# Patient Record
Sex: MALE | Race: WHITE | NOT HISPANIC OR LATINO | Employment: FULL TIME | URBAN - METROPOLITAN AREA
[De-identification: names, ages, dates, MRNs, and addresses within clinical notes are randomized per-mention and may not be internally consistent; named-entity substitution may affect disease eponyms.]

---

## 2017-01-16 ENCOUNTER — TELEPHONE (OUTPATIENT)
Dept: UROLOGY | Facility: CLINIC | Age: 57
End: 2017-01-16

## 2017-01-16 NOTE — TELEPHONE ENCOUNTER
----- Message from Erendira Lucero sent at 1/16/2017 12:10 PM CST -----  Contact: [phone]  Pt would like to be seen sooner than 2/3/2017. Please advise.

## 2017-01-16 NOTE — TELEPHONE ENCOUNTER
Blood in semen has returned. Will reschedule appointment to 1.18.17 at 3:30 pm, verbalized understanding.

## 2017-01-18 ENCOUNTER — OFFICE VISIT (OUTPATIENT)
Dept: UROLOGY | Facility: CLINIC | Age: 57
End: 2017-01-18
Payer: COMMERCIAL

## 2017-01-18 VITALS
SYSTOLIC BLOOD PRESSURE: 132 MMHG | BODY MASS INDEX: 28.31 KG/M2 | TEMPERATURE: 98 F | WEIGHT: 209 LBS | HEART RATE: 66 BPM | DIASTOLIC BLOOD PRESSURE: 70 MMHG | HEIGHT: 72 IN | OXYGEN SATURATION: 99 %

## 2017-01-18 DIAGNOSIS — R36.1 HEMATOSPERMIA: Primary | ICD-10-CM

## 2017-01-18 DIAGNOSIS — R35.1 NOCTURIA: ICD-10-CM

## 2017-01-18 DIAGNOSIS — N40.1 BENIGN NON-NODULAR PROSTATIC HYPERPLASIA WITH LOWER URINARY TRACT SYMPTOMS: ICD-10-CM

## 2017-01-18 DIAGNOSIS — R31.9 HEMATURIA: ICD-10-CM

## 2017-01-18 LAB
BILIRUB UR QL STRIP: NEGATIVE
CLARITY UR REFRACT.AUTO: CLEAR
COLOR UR AUTO: YELLOW
GLUCOSE UR QL STRIP: NEGATIVE
HGB UR QL STRIP: NEGATIVE
KETONES UR QL STRIP: NEGATIVE
LEUKOCYTE ESTERASE UR QL STRIP: NEGATIVE
NITRITE UR QL STRIP: NEGATIVE
PH UR STRIP: 6 [PH] (ref 5–8)
PROT UR QL STRIP: NEGATIVE
SP GR UR STRIP: 1.02 (ref 1–1.03)
URN SPEC COLLECT METH UR: NORMAL
UROBILINOGEN UR STRIP-ACNC: 2 EU/DL

## 2017-01-18 PROCEDURE — 1159F MED LIST DOCD IN RCRD: CPT | Mod: S$GLB,,, | Performed by: UROLOGY

## 2017-01-18 PROCEDURE — 87086 URINE CULTURE/COLONY COUNT: CPT

## 2017-01-18 PROCEDURE — 99999 PR PBB SHADOW E&M-EST. PATIENT-LVL III: CPT | Mod: PBBFAC,,, | Performed by: UROLOGY

## 2017-01-18 PROCEDURE — 99212 OFFICE O/P EST SF 10 MIN: CPT | Mod: S$GLB,,, | Performed by: UROLOGY

## 2017-01-18 PROCEDURE — 81003 URINALYSIS AUTO W/O SCOPE: CPT

## 2017-01-18 NOTE — PROGRESS NOTES
Subjective:       Patient ID: Diallo Dawkins is a 56 y.o. male.    Chief Complaint: Other (f/u hematospermia)    HPI Comments: Hematospermia recurrent.    Other   This is a recurrent problem. The current episode started 1 to 4 weeks ago. The problem occurs constantly. The problem has been unchanged. Pertinent negatives include no abdominal pain, anorexia, arthralgias, change in bowel habit, chest pain, chills, congestion, coughing, diaphoresis, fatigue, fever, headaches, joint swelling, myalgias, nausea, neck pain, numbness, rash, sore throat, swollen glands, urinary symptoms, vertigo, visual change, vomiting or weakness. Nothing aggravates the symptoms. He has tried nothing for the symptoms.     Review of Systems   Constitutional: Negative for activity change, appetite change, chills, diaphoresis, fatigue, fever and unexpected weight change.   HENT: Negative for congestion, hearing loss, sinus pressure, sore throat and trouble swallowing.    Eyes: Negative for photophobia, pain, discharge and visual disturbance.   Respiratory: Negative for apnea, cough and shortness of breath.    Cardiovascular: Negative for chest pain, palpitations and leg swelling.   Gastrointestinal: Negative for abdominal distention, abdominal pain, anal bleeding, anorexia, blood in stool, change in bowel habit, constipation, diarrhea, nausea, rectal pain and vomiting.   Endocrine: Negative for cold intolerance, heat intolerance, polydipsia, polyphagia and polyuria.   Genitourinary: Negative for decreased urine volume, difficulty urinating, discharge, dysuria, enuresis, flank pain, frequency, genital sores, hematuria, penile pain, penile swelling, scrotal swelling, testicular pain and urgency.   Musculoskeletal: Negative for arthralgias, back pain, joint swelling, myalgias and neck pain.   Skin: Negative for color change, pallor, rash and wound.   Allergic/Immunologic: Negative for environmental allergies, food allergies and  immunocompromised state.   Neurological: Negative for dizziness, vertigo, seizures, weakness, numbness and headaches.   Hematological: Negative for adenopathy. Does not bruise/bleed easily.   Psychiatric/Behavioral: Negative.        Objective:      Physical Exam   Nursing note and vitals reviewed.  Constitutional: He is oriented to person, place, and time. He appears well-developed and well-nourished.   HENT:   Head: Normocephalic.   Nose: Nose normal.   Mouth/Throat: Oropharynx is clear and moist.   Eyes: Conjunctivae and EOM are normal. Pupils are equal, round, and reactive to light.   Neck: Normal range of motion. Neck supple.   Cardiovascular: Normal rate, regular rhythm, normal heart sounds and intact distal pulses.    Pulmonary/Chest: Effort normal and breath sounds normal.   Abdominal: Soft. Bowel sounds are normal.   Genitourinary: Rectum normal and penis normal. Prostate is enlarged and tender.   Musculoskeletal: Normal range of motion.   Neurological: He is alert and oriented to person, place, and time. He has normal reflexes.   Skin: Skin is warm and dry.     Psychiatric: He has a normal mood and affect. His behavior is normal. Judgment and thought content normal.       Assessment:       1. Hematospermia    2. Benign non-nodular prostatic hyperplasia with lower urinary tract symptoms    3. Hematuria    4. Nocturia        Plan:       Patient Instructions   Check U/A and Urine C+S  F/U 6 mo for PSA and PARISH

## 2017-01-18 NOTE — MR AVS SNAPSHOT
[facility]  [address]  [place] [address]  Phone: [phone]  Fax: [phone]                  Diallo Dawkins   2017 3:30 PM   Office Visit    Description:  Male : 1960   Provider:  Maxx Arana MD   Department:  [facility]           Reason for Visit     Other           Diagnoses this Visit        Comments    Hematospermia    -  Primary     Benign non-nodular prostatic hyperplasia with lower urinary tract symptoms         Hematuria         Nocturia                To Do List           Future Appointments        Provider Department Dept Phone    2017 8:30 AM Denis Miguel MD [facility] [phone]      Goals (5 Years of Data)     None      Follow-Up and Disposition     Return in about 6 months (around 2017) for with PSA and PARISH.    Follow-up and Disposition History      [facility]     [facility] Care [facility] -  Assistance  Registered nurses in the [facility] provide clinical advisement, health education, appointment booking, and other advisory services.  Call for this free service at [phone].             Medications           Message regarding Medications     Verify the changes and/or additions to your medication regime listed below are the same as discussed with your clinician today.  If any of these changes or additions are incorrect, please notify your healthcare provider.             Verify that the below list of medications is an accurate representation of the medications you are currently taking.  If none reported, the list may be blank. If incorrect, please contact your healthcare provider. Carry this list with you in case of emergency.                Clinical Reference Information           Vital Signs - Last Recorded  Most recent update: 2017  4:07 PM by Tristan Baxter MA    BP Pulse Temp Ht Wt SpO2    132/70 66 98 °F (36.7 °C) 6' (1.829 m) 94.8 kg (209 lb) 99%    BMI                 28.35 kg/m2          Blood Pressure          Most Recent Value    BP  132/70      Allergies as of 1/18/2017     No Known Allergies      Immunizations Administered on Date of Encounter - 1/18/2017     None      Orders Placed During Today's Visit      Normal Orders This Visit    Urinalysis     Urine culture     Future Labs/Procedures Expected by Expires    Urine culture  1/18/2017 3/19/2018    Urinalysis  As directed 5/20/2017      [facility] Sign-Up     Activating your [facility] account is as easy as 1-2-3!     1) Visit [email or web address], select Sign Up Now, enter this activation code and your date of birth, then select Next.  [email or web address]  Expires: 3/4/2017  4:33 PM      2) Create a username and password to use when you visit [facility] in the future and select a security question in case you lose your password and select Next.    3) Enter your e-mail address and click Sign Up!    Additional Information  If you have questions, please e-mail [email or web address] or call [phone] to talk to our [facility] staff. Remember, [facility] is NOT to be used for urgent needs. For medical emergencies, dial 911.         Instructions    Check U/A and Urine C+S  F/U 6 mo for PSA and PARISH

## 2017-01-19 ENCOUNTER — TELEPHONE (OUTPATIENT)
Dept: UROLOGY | Facility: CLINIC | Age: 57
End: 2017-01-19

## 2017-01-19 LAB — BACTERIA UR CULT: NO GROWTH

## 2017-01-23 ENCOUNTER — OFFICE VISIT (OUTPATIENT)
Dept: FAMILY MEDICINE | Facility: CLINIC | Age: 57
End: 2017-01-23
Payer: COMMERCIAL

## 2017-01-23 VITALS
OXYGEN SATURATION: 98 % | BODY MASS INDEX: 28.07 KG/M2 | DIASTOLIC BLOOD PRESSURE: 90 MMHG | HEART RATE: 69 BPM | TEMPERATURE: 98 F | WEIGHT: 207 LBS | SYSTOLIC BLOOD PRESSURE: 135 MMHG

## 2017-01-23 DIAGNOSIS — M25.511 CHRONIC RIGHT SHOULDER PAIN: ICD-10-CM

## 2017-01-23 DIAGNOSIS — Z11.59 NEED FOR HEPATITIS C SCREENING TEST: ICD-10-CM

## 2017-01-23 DIAGNOSIS — G89.29 CHRONIC RIGHT SHOULDER PAIN: ICD-10-CM

## 2017-01-23 DIAGNOSIS — Z23 FLU VACCINE NEED: ICD-10-CM

## 2017-01-23 DIAGNOSIS — Z00.00 ROUTINE GENERAL MEDICAL EXAMINATION AT A HEALTH CARE FACILITY: Primary | ICD-10-CM

## 2017-01-23 DIAGNOSIS — R03.0 ELEVATED BLOOD PRESSURE READING: ICD-10-CM

## 2017-01-23 DIAGNOSIS — Z23 NEED FOR IMMUNIZATION AGAINST TETANUS ALONE: ICD-10-CM

## 2017-01-23 PROCEDURE — 99386 PREV VISIT NEW AGE 40-64: CPT | Mod: S$GLB,,, | Performed by: INTERNAL MEDICINE

## 2017-01-23 PROCEDURE — 90471 IMMUNIZATION ADMIN: CPT | Mod: S$GLB,,, | Performed by: INTERNAL MEDICINE

## 2017-01-23 PROCEDURE — 90715 TDAP VACCINE 7 YRS/> IM: CPT | Mod: S$GLB,,, | Performed by: INTERNAL MEDICINE

## 2017-01-23 PROCEDURE — 90472 IMMUNIZATION ADMIN EACH ADD: CPT | Mod: S$GLB,,, | Performed by: INTERNAL MEDICINE

## 2017-01-23 PROCEDURE — 90686 IIV4 VACC NO PRSV 0.5 ML IM: CPT | Mod: S$GLB,,, | Performed by: INTERNAL MEDICINE

## 2017-01-23 PROCEDURE — 99999 PR PBB SHADOW E&M-EST. PATIENT-LVL III: CPT | Mod: PBBFAC,,, | Performed by: INTERNAL MEDICINE

## 2017-01-23 NOTE — PROGRESS NOTES
HPI:  Diallo Dawkins is a 56 y.o. year old male that  presents with   Chief Complaint   Patient presents with    Annual Exam    Shoulder Pain    Hypertension   .       History reviewed. No pertinent past medical history.  Social History     Social History    Marital status: Single     Spouse name: N/A    Number of children: N/A    Years of education: N/A     Occupational History    Not on file.     Social History Main Topics    Smoking status: Never Smoker    Smokeless tobacco: Not on file    Alcohol use No    Drug use: No    Sexual activity: Not on file     Other Topics Concern    Not on file     Social History Narrative    No narrative on file     Past Surgical History   Procedure Laterality Date    Other surgical history       angiogram on wrist     Family History   Problem Relation Age of Onset    Cancer Paternal Grandmother            HPI Comments: Pt stated that he was told his BP was elevated on donating blood.  No meds now.  Does not want any.  Consulted pt on needd to to DASH, but if persistent in 1 month will need meds.    Pt had cscope 6 years ago and had polyps. Will see previous GI.    No td or flu recently.    C/O rt shoulder pain which is chronic.  Saw [facility] in the past and was referred to PT which he did not do.  Not on NSAID's for it.  Will refer to PT.  Pain is worse with reaching around to back or with elevating over his head.  Will refer to PT.    Reviewed current chart and will need to get old records from [facility].                    Shoulder Pain    The pain is present in the right shoulder. This is a chronic problem. The current episode started more than 1 year ago. There has been no history of extremity trauma. The problem occurs daily. The problem has been unchanged. The quality of the pain is described as aching. The pain is mild. Pertinent negatives include no fever, headaches, inability to bear weight, joint locking, joint swelling, limited range of motion, numbness  or tingling. The symptoms are aggravated by activity. He has tried nothing for the symptoms.   Hypertension   The current episode started more than 1 month ago (Pt stated that he was told it was elevated when donating blood). The problem is unchanged. Associated symptoms include neck pain. Pertinent negatives include no blurred vision, chest pain, headaches, malaise/fatigue or shortness of breath. Risk factors for coronary artery disease include male gender. Past treatments include nothing. There is no history of angina, kidney disease or CAD/MI. There is no history of chronic renal disease or hyperaldosteronism.       Health Maintenance Topics with due status: Overdue       Topic Date Due    Hepatitis C Screening 1960    Lipid Panel 1960    TETANUS VACCINE 08/16/1976    Colonoscopy 08/16/2008    Influenza Vaccine 08/01/2016       Review of Systems  Review of Systems   Constitutional: Negative for chills, fever, malaise/fatigue and weight loss.   Eyes: Negative for blurred vision.   Respiratory: Negative for shortness of breath.    Cardiovascular: Negative for chest pain.   Gastrointestinal: Negative for abdominal pain.   Genitourinary: Negative for dysuria.   Musculoskeletal: Positive for neck pain.   Skin: Negative for rash.   Neurological: Negative for tingling, numbness and headaches.   Psychiatric/Behavioral: The patient is not nervous/anxious.          Physical Exam:  Visit Vitals    BP (!) 135/90    Pulse 69    Temp 98.1 °F (36.7 °C)    Wt 93.9 kg (207 lb)    SpO2 98%    BMI 28.07 kg/m2     Physical Exam   Constitutional: He is oriented to person, place, and time and well-developed, well-nourished, and in no distress.   HENT:   Head: Normocephalic and atraumatic.   Eyes: Conjunctivae and EOM are normal. Pupils are equal, round, and reactive to light.   Neck: Normal range of motion. Carotid bruit is not present. No thyromegaly present.   Cardiovascular: Normal rate, regular rhythm, normal  heart sounds and intact distal pulses.  Exam reveals no gallop and no friction rub.    No murmur heard.  Pulmonary/Chest: Effort normal and breath sounds normal. No respiratory distress. He has no wheezes. He has no rales. He exhibits no tenderness.   Abdominal: Soft. Bowel sounds are normal. He exhibits no distension and no mass. There is no tenderness. There is no rebound and no guarding.   Musculoskeletal: Normal range of motion.   Neurological: He is alert and oriented to person, place, and time. Gait normal.   Skin: Skin is warm and dry. No rash noted.   Psychiatric: Affect normal.         LABS:    Recent Results (from the past 2016 hour(s))   Urine culture    Collection Time: 01/18/17  4:15 PM   Result Value Ref Range    Urine Culture, Routine No growth    Urinalysis    Collection Time: 01/18/17  4:15 PM   Result Value Ref Range    Specimen UA Urine, Clean Catch     Color, UA Yellow Yellow, Straw, [place]    Appearance, UA Clear Clear    pH, UA 6.0 5.0 - 8.0    Specific Gravity, UA 1.020 1.005 - 1.030    Protein, UA Negative Negative    Glucose, UA Negative Negative    Ketones, UA Negative Negative    Bilirubin (UA) Negative Negative    Occult Blood UA Negative Negative    Nitrite, UA Negative Negative    Urobilinogen, UA 2.0 <2.0 EU/dL    Leukocytes, UA Negative Negative       Imaging:  Imaging Results     None            Assessment:    ICD-10-CM ICD-9-CM    1. Routine general medical examination at a health care facility Z00.00 V70.0 Ambulatory consult to Gastroenterology      Lipid panel   2. Elevated blood pressure reading R03.0 796.2    3. Need for immunization against tetanus alone Z23 V03.7 Td Vaccine (Adult)   4. Flu vaccine need Z23 V04.81 Influenza - Quadrivalent (3 years & older)   5. Need for hepatitis C screening test Z11.59 V73.89 Hepatitis C antibody   6. Chronic right shoulder pain M25.511 719.41 Ambulatory consult to Physical Therapy    G89.29 338.29      The primary encounter diagnosis was  Routine general medical examination at a health care facility. Diagnoses of Elevated blood pressure reading, Need for immunization against tetanus alone, Flu vaccine need, Need for hepatitis C screening test, and Chronic right shoulder pain were also pertinent to this visit.      Plan:  Orders Placed This Encounter   Procedures    Influenza - Quadrivalent (3 years & older)    Td Vaccine (Adult)    Hepatitis C antibody    Lipid panel    Ambulatory consult to Gastroenterology    Ambulatory consult to Physical Therapy           Denis Miguel MD

## 2017-01-23 NOTE — PATIENT INSTRUCTIONS
Pt is to do DASH diet and follow up in 1 month for bp radha.    Also to get Colonoscopy screening done.    To see PT for shoulder pain.

## 2017-01-23 NOTE — MR AVS SNAPSHOT
[facility]  [address]  [place] [address]  Phone: [phone]  Fax: [phone]                  Diallo Dawkins   2017 8:30 AM   Office Visit    Description:  Male : 1960   Provider:  Denis Miguel MD   Department:  [facility]           Reason for Visit     Annual Exam     Shoulder Pain     Hypertension           Diagnoses this Visit        Comments    Routine general medical examination at a health care facility    -  Primary     Elevated blood pressure reading         Need for immunization against tetanus alone         Flu vaccine need         Need for hepatitis C screening test         Chronic right shoulder pain                To Do List           Future Appointments        Provider Department Dept Phone    2017 8:00 AM Denis Miguel MD [facility] [phone]      Goals (5 Years of Data)     None      Follow-Up and Disposition     Return in about 1 month (around 2017) for follow up.      [facility]     [facility] On Call Nurse Care Line -  Assistance  Registered nurses in the [facility] Center provide clinical advisement, health education, appointment booking, and other advisory services.  Call for this free service at [phone].             Medications           Message regarding Medications     Verify the changes and/or additions to your medication regime listed below are the same as discussed with your clinician today.  If any of these changes or additions are incorrect, please notify your healthcare provider.             Verify that the below list of medications is an accurate representation of the medications you are currently taking.  If none reported, the list may be blank. If incorrect, please contact your healthcare provider. Carry this list with you in case of emergency.                Clinical Reference Information           Vital Signs - Last Recorded  Most recent update: 2017  8:27  AM by Tristan Baxter MA    BP Pulse Temp Wt SpO2 BMI    (!) 135/90 69 98.1 °F (36.7 °C) 93.9 kg (207 lb) 98% 28.07 kg/m2      Blood Pressure          Most Recent Value    BP  (!)  135/90      Allergies as of 1/23/2017     No Known Allergies      Immunizations Administered on Date of Encounter - 1/23/2017     Name Date Dose VIS Date Route    TDAP 1/23/2017 0.5 mL 2/24/2015 Intramuscular    influenza - Quadrivalent - PF (ADULT) 1/23/2017 0.5 mL 8/7/2015 Intramuscular      Orders Placed During Today's Visit      Normal Orders This Visit    Ambulatory consult to Gastroenterology     Ambulatory consult to Physical Therapy     Influenza - Quadrivalent (3 years & older) (PF)     Tdap Vaccine (Adult)     Future Labs/Procedures Expected by Expires    Hepatitis C antibody  1/23/2017 3/24/2018    Lipid panel  1/23/2017 3/24/2018      [facility] Sign-Up     Activating your [facility] account is as easy as 1-2-3!     1) Visit [email or web address], select Sign Up Now, enter this activation code and your date of birth, then select Next.  [email or web address]  Expires: 3/4/2017  4:33 PM      2) Create a username and password to use when you visit [facility] in the future and select a security question in case you lose your password and select Next.    3) Enter your e-mail address and click Sign Up!    Additional Information  If you have questions, please e-mail [email or web address] or call [phone] to talk to our [facility] staff. Remember, [facility] is NOT to be used for urgent needs. For medical emergencies, dial 911.         Instructions    Pt is to do DASH diet and follow up in 1 month for bp radha.    Also to get Colonoscopy screening done.    To see PT for shoulder pain.

## 2017-02-20 DIAGNOSIS — Z12.11 SCREENING FOR COLON CANCER: Primary | ICD-10-CM

## 2017-03-04 ENCOUNTER — TELEPHONE (OUTPATIENT)
Dept: GASTROENTEROLOGY | Facility: CLINIC | Age: 57
End: 2017-03-04

## 2017-03-16 ENCOUNTER — TELEPHONE (OUTPATIENT)
Dept: FAMILY MEDICINE | Facility: CLINIC | Age: 57
End: 2017-03-16

## 2017-03-16 NOTE — TELEPHONE ENCOUNTER
----- Message from Ivonne Field sent at 3/16/2017  8:52 AM CDT -----  Patient's friend, Mia, called.    No. [phone]    Patient missed his lab appointment in January.  He would like to reschedule for 3/23/17 in the morning at [facility].

## 2017-03-27 ENCOUNTER — TELEPHONE (OUTPATIENT)
Dept: FAMILY MEDICINE | Facility: CLINIC | Age: 57
End: 2017-03-27

## 2017-03-27 NOTE — TELEPHONE ENCOUNTER
Results given, patient verbalized understanding. Patient also requested lab results be mailed, sent to home address today.

## 2017-03-27 NOTE — TELEPHONE ENCOUNTER
----- Message from Denis Miguel MD sent at 3/27/2017 10:22 AM CDT -----  OK lipids and neg for hep c screen.

## 2018-03-06 ENCOUNTER — TELEPHONE (OUTPATIENT)
Dept: FAMILY MEDICINE | Facility: CLINIC | Age: 58
End: 2018-03-06

## 2018-03-06 NOTE — TELEPHONE ENCOUNTER
----- Message from Frieda Quinn sent at 3/6/2018  3:37 PM CST -----  Contact: Mia, girlfriendf, [phone]  Called in requesting to schedule an appointment for patient.States he used to see Dr. Nuñez.

## 2018-03-09 ENCOUNTER — OFFICE VISIT (OUTPATIENT)
Dept: FAMILY MEDICINE | Facility: CLINIC | Age: 58
End: 2018-03-09
Payer: COMMERCIAL

## 2018-03-09 VITALS
OXYGEN SATURATION: 100 % | TEMPERATURE: 98 F | DIASTOLIC BLOOD PRESSURE: 94 MMHG | BODY MASS INDEX: 27.89 KG/M2 | SYSTOLIC BLOOD PRESSURE: 130 MMHG | WEIGHT: 205.94 LBS | HEIGHT: 72 IN | HEART RATE: 70 BPM

## 2018-03-09 DIAGNOSIS — H93.19 TINNITUS, UNSPECIFIED LATERALITY: ICD-10-CM

## 2018-03-09 DIAGNOSIS — Z13.0 SCREENING FOR DEFICIENCY ANEMIA: ICD-10-CM

## 2018-03-09 DIAGNOSIS — Z13.1 DIABETES MELLITUS SCREENING: ICD-10-CM

## 2018-03-09 DIAGNOSIS — Z13.220 SCREENING CHOLESTEROL LEVEL: ICD-10-CM

## 2018-03-09 DIAGNOSIS — Z12.5 PROSTATE CANCER SCREENING: ICD-10-CM

## 2018-03-09 DIAGNOSIS — R42 DIZZINESS: ICD-10-CM

## 2018-03-09 DIAGNOSIS — Z13.29 THYROID DISORDER SCREEN: ICD-10-CM

## 2018-03-09 DIAGNOSIS — R51.9 NONINTRACTABLE EPISODIC HEADACHE, UNSPECIFIED HEADACHE TYPE: ICD-10-CM

## 2018-03-09 DIAGNOSIS — I10 ESSENTIAL HYPERTENSION: Primary | ICD-10-CM

## 2018-03-09 PROCEDURE — 3080F DIAST BP >= 90 MM HG: CPT | Mod: S$GLB,,, | Performed by: NURSE PRACTITIONER

## 2018-03-09 PROCEDURE — 99214 OFFICE O/P EST MOD 30 MIN: CPT | Mod: S$GLB,,, | Performed by: NURSE PRACTITIONER

## 2018-03-09 PROCEDURE — 3075F SYST BP GE 130 - 139MM HG: CPT | Mod: S$GLB,,, | Performed by: NURSE PRACTITIONER

## 2018-03-09 PROCEDURE — 99999 PR PBB SHADOW E&M-EST. PATIENT-LVL IV: CPT | Mod: PBBFAC,,, | Performed by: NURSE PRACTITIONER

## 2018-03-09 NOTE — PROGRESS NOTES
Subjective:       Patient ID: Diallo Dawkins is a 57 y.o. male.    Chief Complaint: Fatigue (all started saturday); Headache; and Dizziness (when pt bends over and get up will be light headed)    Patient is a 57-year-old white male with hypertension and BPH that is here today with complaint of dizziness and headaches that are chronic intermittently for the past 8 months.  He reports he had fatigue that started 1 week ago but this has since resolved.  He reports the dizziness is intermittent and going on for 8 months.  He states that the dizziness only occurs when he bends over and stands or when he is kneeling down and standing-only with position changes.  He reports he also has occasional ringing in the ears intermittently.  Patient also reports headaches to the temple area that are on and off for the past 8 months described as a pulsating/throbbing and gets relief with Tylenol over-the-counter.    Patient does have hypertension.  Patient reports his blood pressure has been elevated for the past couple years.  Patient does not want to start a medication for blood pressure.  He is aware that the symptom of dizziness and headache could be related to the hypertension but would first like to trial lifestyle modifications.  He reports his diet is unhealthy and eats lots of fast foods.      Dizziness:   Chronicity:  Chronic  Onset: described more as light-headed feeling that only occurs after standing from a bent over or kneeling position - started around 8 months ago.  Progression since onset:  Waxing and waning  Duration:  Very brief  Dizziness characteristics:  Lightheaded/impending faint and sensation of movement   Associated symptoms: headaches, tinnitus and light-headedness.no hearing loss, no ear pain, no fever, no nausea, no vomiting, no weakness, no palpitations, no facial weakness, no slurred speech, no numbness in extremities and no chest pain.  Aggravated by:  Bending, getting up and position  changes  Treatments tried:  Nothingno head trauma, no head trauma, no ear tubes and no environmental allergies.   undiagnosed high blood pressure  Fatigue   This is a new problem. Episode onset: started 1 week ago. The problem has been resolved. Associated symptoms include headaches. Pertinent negatives include no abdominal pain, arthralgias, chest pain, congestion, coughing, fatigue, fever, joint swelling, myalgias, nausea, neck pain, rash, sore throat, vomiting or weakness.   Headache    This is a chronic problem. Episode onset: on and off for 8 months as well. The problem occurs intermittently. The problem has been waxing and waning. The pain is located in the temporal region. The pain does not radiate. The quality of the pain is described as pulsating. Pain scale: no pain now - headaches are usually 6/10. Associated symptoms include dizziness and tinnitus. Pertinent negatives include no abdominal pain, back pain, blurred vision, coughing, ear pain, fever, hearing loss, loss of balance, nausea, neck pain, rhinorrhea, seizures, sinus pressure, sore throat, vomiting or weakness. Nothing aggravates the symptoms. He has tried acetaminophen for the symptoms. The treatment provided significant relief. There is no history of cluster headaches or recent head traumas. (Undiagnosed high blood pressure)       Previous Medications    No medications on file       History reviewed. No pertinent past medical history.    Past Surgical History:   Procedure Laterality Date    OTHER SURGICAL HISTORY      angiogram on wrist       Family History   Problem Relation Age of Onset    Cancer Paternal Grandmother     No Known Problems Mother     Heart disease Father      passed age73    No Known Problems Sister     No Known Problems Sister     No Known Problems Sister        Social History     Social History    Marital status: Single     Spouse name: N/A    Number of children: N/A    Years of education: N/A     Occupational  History    sales      Social History Main Topics    Smoking status: Never Smoker    Smokeless tobacco: None    Alcohol use No    Drug use: No    Sexual activity: Not Asked     Other Topics Concern    None     Social History Narrative    None       Review of Systems   Constitutional: Negative for activity change, appetite change, fatigue, fever and unexpected weight change.   HENT: Positive for tinnitus. Negative for congestion, ear pain, hearing loss, mouth sores, nosebleeds, postnasal drip, rhinorrhea, sinus pressure, sneezing, sore throat, trouble swallowing and voice change.    Eyes: Negative.  Negative for blurred vision.   Respiratory: Negative for cough, chest tightness and shortness of breath.    Cardiovascular: Negative for chest pain, palpitations and leg swelling.   Gastrointestinal: Negative.  Negative for abdominal pain, blood in stool, constipation, diarrhea, nausea and vomiting.   Endocrine: Negative.    Genitourinary: Negative for difficulty urinating, dysuria, flank pain, hematuria and urgency.   Musculoskeletal: Negative for arthralgias, back pain, gait problem, joint swelling, myalgias and neck pain.   Skin: Negative for color change, rash and wound.   Allergic/Immunologic: Negative for environmental allergies and immunocompromised state.   Neurological: Positive for dizziness, light-headedness and headaches. Negative for tremors, seizures, syncope, speech difficulty, weakness and loss of balance.   Hematological: Negative for adenopathy. Does not bruise/bleed easily.   Psychiatric/Behavioral: Negative for behavioral problems, dysphoric mood, sleep disturbance and suicidal ideas. The patient is not nervous/anxious.          Objective:     Vitals:    03/09/18 1047   BP: (!) 130/94   BP Location: Left arm   Patient Position: Sitting   BP Method: Medium (Manual)   Pulse: 70   Temp: 97.6 °F (36.4 °C)   TempSrc: Oral   SpO2: 100%   Weight: 93.4 kg (205 lb 14.6 oz)   Height: 6' (1.829 m)           Physical Exam   Constitutional: He is oriented to person, place, and time. He appears well-developed and well-nourished.   HENT:   Head: Normocephalic.   Right Ear: External ear normal.   Left Ear: External ear normal.   Nose: Nose normal.   Mouth/Throat: Oropharynx is clear and moist. No oropharyngeal exudate.   Negative [place]-Hallpike   Eyes: EOM are normal. Pupils are equal, round, and reactive to light. Right eye exhibits no discharge. Left eye exhibits no discharge. No scleral icterus.   Neck: Normal range of motion. Neck supple. No tracheal deviation present. No thyromegaly present.   Cardiovascular: Normal rate, regular rhythm and normal heart sounds.    No murmur heard.  Pulmonary/Chest: Effort normal and breath sounds normal. No respiratory distress.   Abdominal: Soft. He exhibits no distension.   Musculoskeletal: Normal range of motion. He exhibits no edema.   Lymphadenopathy:     He has no cervical adenopathy.   Neurological: He is alert and oriented to person, place, and time. Coordination normal.   Skin: Skin is warm and dry. No rash noted.   Psychiatric: He has a normal mood and affect. His behavior is normal.         Assessment:         ICD-10-CM ICD-9-CM   1. Essential hypertension I10 401.9   2. Dizziness R42 780.4   3. Nonintractable episodic headache, unspecified headache type R51 784.0   4. Tinnitus, unspecified laterality H93.19 388.30   5. Screening for deficiency anemia Z13.0 V78.1   6. Thyroid disorder screen Z13.29 V77.0   7. Screening cholesterol level Z13.220 V77.91   8. Diabetes mellitus screening Z13.1 V77.1   9. Prostate cancer screening Z12.5 V76.44       Plan:       Essential hypertension  -  Patient declines medication for blood pressure at this time.  He would like to first trial lifestyle modifications and recheck in 6 weeks.    Dizziness  -  Patient reports the dizziness is chronic intermittent and happens with position changes he also reports an intermittent ringing of the ears.   [place]-Hallpike test was negative but will refer to ENT M.D. for further evaluation to rule out vestibular causes.  -     Ambulatory Referral to ENT    Nonintractable episodic headache, unspecified headache type  -  Continue Tylenol as needed.    Tinnitus, unspecified laterality  -     Ambulatory Referral to ENT    Screening for deficiency anemia  -     CBC auto differential; Future; Expected date: 03/09/2018    Thyroid disorder screen  -     TSH; Future; Expected date: 03/09/2018    Screening cholesterol level  -     Lipid panel; Future; Expected date: 03/09/2018    Diabetes mellitus screening  -     Comprehensive metabolic panel; Future; Expected date: 03/09/2018    Prostate cancer screening  -     PSA, Screening; Future; Expected date: 03/09/2018      Follow-up in about 6 weeks (around 4/20/2018) for fasting labs and full wellness exam.     Patient's Medications    No medications on file

## 2018-03-09 NOTE — PATIENT INSTRUCTIONS
Controlling High Blood Pressure  High blood pressure (hypertension) is often called the silent killer. This is because many people who have it dont know it. High blood pressure is defined as 140/90 mm Hg or higher. Know your blood pressure and remember to check it regularly. Doing so can save your life. Here are some things you can do to help control your blood pressure.    Choose heart-healthy foods  · Select low-salt, low-fat foods. Limit sodium intake to 2,400 mg per day or the amount suggested by your healthcare provider.  · Limit canned, dried, cured, packaged, and fast foods. These can contain a lot of salt.  · Eat 8 to 10 servings of fruits and vegetables every day.  · Choose lean meats, fish, or chicken.  · Eat whole-grain pasta, brown rice, and beans.  · Eat 2 to 3 servings of low-fat or fat-free dairy products.  · Ask your doctor about the DASH eating plan. This plan helps reduce blood pressure.  · When you go to a restaurant, ask that your meal be prepared with no added salt.  Maintain a healthy weight  · Ask your healthcare provider how many calories to eat a day. Then stick to that number.  · Ask your healthcare provider what weight range is healthiest for you. If you are overweight, a weight loss of only 3% to 5% of your body weight can help lower blood pressure. Generally, a good weight loss goal is to lose 10% of your body weight in a year.  · Limit snacks and sweets.  · Get regular exercise.  Get up and get active  · Choose activities you enjoy. Find ones you can do with friends or family. This includes bicycling, dancing, walking, and jogging.  · Park farther away from building entrances.  · Use stairs instead of the elevator.  · When you can, walk or bike instead of driving.  · [place] leaves, garden, or do household repairs.  · Be active at a moderate to vigorous level of physical activity for at least 40 minutes for a minimum of 3 to 4 days a week.   Manage stress  · Make time to relax and enjoy  life. Find time to laugh.  · Communicate your concerns with your loved ones and your healthcare provider.  · Visit with family and friends, and keep up with hobbies.  Limit alcohol and quit smoking  · Men should have no more than 2 drinks per day.  · Women should have no more than 1 drink per day.  · Talk with your healthcare provider about quitting smoking. Smoking significantly increases your risk for heart disease and stroke. Ask your healthcare provider about community smoking cessation programs and other options.  Medicines  If lifestyle changes arent enough, your healthcare provider may prescribe high blood pressure medicine. Take all medicines as prescribed. If you have any questions about your medicines, ask your healthcare provider before stopping or changing them.   Date Last Reviewed: 4/27/2016  © 4568-0713 [employer], [employer]. [address] [place] [address], [place] [address]. All rights reserved. This information is not intended as a substitute for professional medical care. Always follow your healthcare professional's instructions.

## 2018-03-28 ENCOUNTER — TELEPHONE (OUTPATIENT)
Dept: OTOLARYNGOLOGY | Facility: CLINIC | Age: 58
End: 2018-03-28

## 2018-03-28 NOTE — TELEPHONE ENCOUNTER
Call placed to pt meredith Bellamy, appt r/s'd per request. Next avail date is Wed. 5/2, she accepted the new appt date w/o apprehension. Denied further needs at this time.

## 2018-03-28 NOTE — TELEPHONE ENCOUNTER
----- Message from Ottoniel Richardson sent at 3/28/2018  4:33 PM CDT -----  Contact: [phone]/girlfriend  Mia Bellamy is requesting to speak with you concerning rescheduling the patient appointment  Please call and advise

## 2018-04-04 ENCOUNTER — OFFICE VISIT (OUTPATIENT)
Dept: UROLOGY | Facility: CLINIC | Age: 58
End: 2018-04-04
Payer: COMMERCIAL

## 2018-04-04 VITALS
BODY MASS INDEX: 27.77 KG/M2 | RESPIRATION RATE: 17 BRPM | HEIGHT: 72 IN | DIASTOLIC BLOOD PRESSURE: 85 MMHG | WEIGHT: 205 LBS | SYSTOLIC BLOOD PRESSURE: 143 MMHG | OXYGEN SATURATION: 98 % | HEART RATE: 92 BPM

## 2018-04-04 DIAGNOSIS — R36.1 HEMATOSPERMIA: ICD-10-CM

## 2018-04-04 DIAGNOSIS — R31.9 HEMATURIA, UNSPECIFIED TYPE: ICD-10-CM

## 2018-04-04 DIAGNOSIS — N40.1 BENIGN NON-NODULAR PROSTATIC HYPERPLASIA WITH LOWER URINARY TRACT SYMPTOMS: Primary | ICD-10-CM

## 2018-04-04 DIAGNOSIS — R35.1 NOCTURIA: ICD-10-CM

## 2018-04-04 PROCEDURE — 3077F SYST BP >= 140 MM HG: CPT | Mod: CPTII,S$GLB,, | Performed by: UROLOGY

## 2018-04-04 PROCEDURE — 99213 OFFICE O/P EST LOW 20 MIN: CPT | Mod: S$GLB,,, | Performed by: UROLOGY

## 2018-04-04 PROCEDURE — 3079F DIAST BP 80-89 MM HG: CPT | Mod: CPTII,S$GLB,, | Performed by: UROLOGY

## 2018-04-04 PROCEDURE — 99999 PR PBB SHADOW E&M-EST. PATIENT-LVL III: CPT | Mod: PBBFAC,,, | Performed by: UROLOGY

## 2018-04-04 NOTE — PROGRESS NOTES
Subjective:       Patient ID: Diallo Dawkins is a 57 y.o. male.    Chief Complaint: Follow-up    56 yo with Stable BPH, Nocturia x 1-2, Frequency x 3, Rare hematospermia. Here for f/u      Benign Prostatic Hypertrophy   This is a chronic problem. The current episode started more than 1 year ago. The problem has been waxing and waning since onset. Irritative symptoms include frequency (x3) and nocturia (x1-2). Irritative symptoms do not include urgency. Obstructive symptoms include a slower stream. Obstructive symptoms do not include dribbling, incomplete emptying, an intermittent stream, straining or a weak stream. Pertinent negatives include no chills, dysuria, genital pain, hematuria, hesitancy, nausea or vomiting. AUA score is 0-7. He is sexually active. Nothing aggravates the symptoms. Past treatments include nothing.     Review of Systems   Constitutional: Negative for activity change, appetite change, chills, diaphoresis, fatigue, fever and unexpected weight change.   HENT: Negative for congestion, hearing loss, sinus pressure and trouble swallowing.    Eyes: Negative for photophobia, pain, discharge and visual disturbance.   Respiratory: Negative for apnea, cough and shortness of breath.    Cardiovascular: Negative for chest pain, palpitations and leg swelling.   Gastrointestinal: Negative for abdominal distention, abdominal pain, anal bleeding, blood in stool, constipation, diarrhea, nausea, rectal pain and vomiting.   Endocrine: Negative for cold intolerance, heat intolerance, polydipsia, polyphagia and polyuria.   Genitourinary: Positive for frequency (x3) and nocturia (x1-2). Negative for decreased urine volume, difficulty urinating, discharge, dysuria, enuresis, flank pain, genital sores, hematuria, hesitancy, incomplete emptying, penile pain, penile swelling, scrotal swelling, testicular pain and urgency.   Musculoskeletal: Negative for arthralgias, back pain and myalgias.   Skin: Negative for  color change, pallor, rash and wound.   Allergic/Immunologic: Negative for environmental allergies, food allergies and immunocompromised state.   Neurological: Negative for dizziness, seizures, weakness and headaches.   Hematological: Negative for adenopathy. Does not bruise/bleed easily.   Psychiatric/Behavioral: Negative.        Objective:      Physical Exam   Nursing note and vitals reviewed.  Constitutional: He is oriented to person, place, and time. He appears well-developed and well-nourished.   HENT:   Head: Normocephalic.   Nose: Nose normal.   Mouth/Throat: Oropharynx is clear and moist.   Eyes: Conjunctivae and EOM are normal. Pupils are equal, round, and reactive to light.   Neck: Normal range of motion. Neck supple.   Cardiovascular: Normal rate, regular rhythm, normal heart sounds and intact distal pulses.    Pulmonary/Chest: Effort normal and breath sounds normal.   Abdominal: Soft. Bowel sounds are normal.   Genitourinary: Rectum normal, testes normal and penis normal. Prostate is enlarged. Prostate is not tender. Circumcised.   Musculoskeletal: Normal range of motion.   Neurological: He is alert and oriented to person, place, and time. He has normal reflexes.   Skin: Skin is warm and dry.     Psychiatric: He has a normal mood and affect. His behavior is normal. Judgment and thought content normal.       Assessment:       1. Benign non-nodular prostatic hyperplasia with lower urinary tract symptoms    2. Hematuria, unspecified type    3. Nocturia    4. Hematospermia        Plan:       Patient Instructions   F/U yearly With PSA and [facility]

## 2018-04-19 ENCOUNTER — TELEPHONE (OUTPATIENT)
Dept: UROLOGY | Facility: CLINIC | Age: 58
End: 2018-04-19

## 2018-04-19 ENCOUNTER — OFFICE VISIT (OUTPATIENT)
Dept: FAMILY MEDICINE | Facility: CLINIC | Age: 58
End: 2018-04-19
Payer: COMMERCIAL

## 2018-04-19 VITALS
HEIGHT: 72 IN | SYSTOLIC BLOOD PRESSURE: 142 MMHG | OXYGEN SATURATION: 97 % | WEIGHT: 205.13 LBS | HEART RATE: 62 BPM | DIASTOLIC BLOOD PRESSURE: 96 MMHG | BODY MASS INDEX: 27.79 KG/M2 | TEMPERATURE: 98 F

## 2018-04-19 DIAGNOSIS — D64.9 ANEMIA, UNSPECIFIED TYPE: ICD-10-CM

## 2018-04-19 DIAGNOSIS — R97.20 ELEVATED PSA: Primary | ICD-10-CM

## 2018-04-19 DIAGNOSIS — R73.01 IFG (IMPAIRED FASTING GLUCOSE): ICD-10-CM

## 2018-04-19 DIAGNOSIS — Z00.00 ANNUAL PHYSICAL EXAM: Primary | ICD-10-CM

## 2018-04-19 DIAGNOSIS — R97.20 ELEVATED PSA, LESS THAN 10 NG/ML: ICD-10-CM

## 2018-04-19 DIAGNOSIS — I10 ESSENTIAL HYPERTENSION: ICD-10-CM

## 2018-04-19 DIAGNOSIS — N40.1 BENIGN NON-NODULAR PROSTATIC HYPERPLASIA WITH LOWER URINARY TRACT SYMPTOMS: ICD-10-CM

## 2018-04-19 PROCEDURE — 99999 PR PBB SHADOW E&M-EST. PATIENT-LVL IV: CPT | Mod: PBBFAC,,, | Performed by: NURSE PRACTITIONER

## 2018-04-19 PROCEDURE — 99396 PREV VISIT EST AGE 40-64: CPT | Mod: S$GLB,,, | Performed by: NURSE PRACTITIONER

## 2018-04-19 PROCEDURE — 3080F DIAST BP >= 90 MM HG: CPT | Mod: CPTII,S$GLB,, | Performed by: NURSE PRACTITIONER

## 2018-04-19 PROCEDURE — 3077F SYST BP >= 140 MM HG: CPT | Mod: CPTII,S$GLB,, | Performed by: NURSE PRACTITIONER

## 2018-04-19 RX ORDER — VALSARTAN 80 MG/1
80 TABLET ORAL DAILY
Qty: 30 TABLET | Refills: 0 | Status: SHIPPED | OUTPATIENT
Start: 2018-04-19 | End: 2018-05-20 | Stop reason: SDUPTHER

## 2018-04-19 NOTE — TELEPHONE ENCOUNTER
----- Message from Keya Castro NP sent at 4/19/2018  1:40 PM CDT -----  HI Dr. Arana,    This is a mutual patient of ours.  He seen you on 4/4/18 but we were waiting on lab results when you seen him.  His PSA level is 8.7.    Keya

## 2018-04-19 NOTE — PROGRESS NOTES
Subjective:       Patient ID: Diallo Dawkins is a 57 y.o. male.    Chief Complaint: Annual Exam (wellness)    Patient is a 57 year old white male with Hypertension and BPH that is here today for annual physical exam with fasting lab results.    Patient has Hypertension.  At last visit in March 2018, Patient reported his blood pressure has been elevated for the past couple years.  Patient did not want to start a medication for blood pressure.  He was aware that the symptom of dizziness and headache could be related to the hypertension but wanted to  first trial lifestyle modifications.  He reported his diet was unhealthy and eats lots of fast foods. IN March 2018, had advised patient on lifestyle modifications and is here today to recheck.  Blood pressure remains elevated despite lifestyle modifications.  BP (!) 142/96   Pulse 62   Temp 98 °F (36.7 °C) (Oral)   Ht 6' (1.829 m)   Wt 93.1 kg (205 lb 2.2 oz)   SpO2 97%   BMI 27.82 kg/m²     Patient has BPH that is now followed by Dr. Arana, urologist.  Patient seen Dr. Arana on 4/4/18 for evaluation.  At that time, lab work was not completed.  PSA is elevated at 8.7.  Dr. Arana was sent a message on portal notifying him of patient's PSA level - message was acknowledged by Dr. Arana.    Wellness Labs:  -  CBC shows a mild anemia present.  MCH is mildly elevated at 32.  Advised patient that this could represent a vitamin deficiency.  Advised patient to take a MVI daily and will recheck CBC and vitamin levels in 4 weeks.  -  CMP within acceptable range except for .  Advised patient on lifestyle modifications.  Will recheck CMP with HgbA1C in 4 weeks.  -  Cholesterol within acceptable limits other than low HDL 37.  -  Thyroid screening is normal.  -  PSA elevated 8.7.  His urologist was notified.      Component      Latest Ref Rng & Units 4/9/2018   WBC      3.90 - 12.70 K/uL 3.81 (L)   RBC      4.60 - 6.20 M/uL 4.12 (L)   Hemoglobin      14.0 - 18.0  g/dL 13.2 (L)   Hematocrit      40.0 - 54.0 % 36.9 (L)   MCV      82 - 98 fL 90   MCH      27.0 - 31.0 pg 32.0 (H)   MCHC      32.0 - 36.0 g/dL 35.8   RDW      11.5 - 14.5 % 14.4   Platelets      150 - 350 K/uL 199   MPV      9.2 - 12.9 fL 9.6   Gran # (ANC)      1.8 - 7.7 K/uL 1.9   Lymph #      1.0 - 4.8 K/uL 1.3   Mono #      0.3 - 1.0 K/uL 0.4   Eos #      0.0 - 0.5 K/uL 0.1   Baso #      0.00 - 0.20 K/uL 0.02   Gran%      38.0 - 73.0 % 50.6   Lymph%      18.0 - 48.0 % 35.2   Mono%      4.0 - 15.0 % 11.3   Eosinophil%      0.0 - 8.0 % 2.4   Basophil%      0.0 - 1.9 % 0.5   Differential Method       Automated   Sodium      136 - 145 mmol/L 143   Potassium      3.5 - 5.1 mmol/L 4.3   Chloride      95 - 110 mmol/L 105   CO2      23 - 29 mmol/L 28   Glucose      70 - 110 mg/dL 121 (H)   BUN, Bld      2 - 20 mg/dL 19   Creatinine      0.50 - 1.40 mg/dL 0.92   Calcium      8.7 - 10.5 mg/dL 9.1   Total Protein      6.0 - 8.4 g/dL 7.3   Albumin      3.5 - 5.2 g/dL 4.3   Total Bilirubin      0.1 - 1.0 mg/dL 0.7   Alkaline Phosphatase      38 - 126 U/L 50   AST      15 - 46 U/L 30   ALT      10 - 44 U/L 35   Anion Gap      8 - 16 mmol/L 10   eGFR if African American      >60 mL/min/1.73 m:2 >60.0   eGFR if non African American      >60 mL/min/1.73 m:2 >60.0   Cholesterol      120 - 199 mg/dL 162   Triglycerides      30 - 150 mg/dL 88   HDL      40 - 75 mg/dL 37 (L)   LDL Cholesterol      63.0 - 159.0 mg/dL 107.4   HDL/Chol Ratio      20.0 - 50.0 % 22.8   Total Cholesterol/HDL Ratio      2.0 - 5.0 4.4   Non-HDL Cholesterol      mg/dL 125   TSH      0.400 - 4.000 uIU/mL 1.890   PSA, SCREEN      0.00 - 4.00 ng/mL 8.7 (H)     Previous Medications    No medications on file       Past Medical History:   Diagnosis Date    Benign non-nodular prostatic hyperplasia with lower urinary tract symptoms 07/19/2016    Followed by Dr. Arana    Essential hypertension 3/9/2018    Urinary tract infection        Past Surgical History:    Procedure Laterality Date    OTHER SURGICAL HISTORY      angiogram on wrist       Family History   Problem Relation Age of Onset    Cancer Paternal Grandmother     No Known Problems Mother     Heart disease Father      passed age73    Hypertension Father     Hyperlipidemia Father     No Known Problems Sister     No Known Problems Sister     No Known Problems Sister     Kidney disease Neg Hx     Prostate cancer Neg Hx        Social History     Social History    Marital status: Single     Spouse name: N/A    Number of children: N/A    Years of education: N/A     Occupational History    sales      Social History Main Topics    Smoking status: Never Smoker    Smokeless tobacco: Never Used    Alcohol use Yes      Comment: twice a month - 2 drinks per occasion    Drug use: No    Sexual activity: Yes     Partners: Female     Other Topics Concern    None     Social History Narrative    None       Review of Systems   Constitutional: Negative for activity change, appetite change, fatigue, fever and unexpected weight change.   HENT: Positive for tinnitus. Negative for congestion, ear pain, hearing loss, mouth sores, nosebleeds, postnasal drip, rhinorrhea, sinus pressure, sneezing, sore throat, trouble swallowing and voice change.    Eyes: Negative.    Respiratory: Negative for cough, chest tightness and shortness of breath.    Cardiovascular: Negative for chest pain, palpitations and leg swelling.   Gastrointestinal: Negative.  Negative for abdominal pain, blood in stool, constipation, diarrhea, nausea and vomiting.   Endocrine: Negative.    Genitourinary: Negative for difficulty urinating, dysuria, flank pain, hematuria and urgency.   Musculoskeletal: Negative for arthralgias, back pain, gait problem, joint swelling, myalgias and neck pain.   Skin: Negative for color change, rash and wound.   Allergic/Immunologic: Negative for environmental allergies and immunocompromised state.   Neurological:  Positive for dizziness, light-headedness and headaches. Negative for tremors, seizures, syncope, speech difficulty and weakness.   Hematological: Negative for adenopathy. Does not bruise/bleed easily.   Psychiatric/Behavioral: Negative for behavioral problems, dysphoric mood, sleep disturbance and suicidal ideas. The patient is not nervous/anxious.          Objective:     Vitals:    04/19/18 1313 04/19/18 1334   BP: (!) 142/102 (!) 142/96   BP Location: Right arm    Patient Position: Sitting    BP Method: Medium (Manual)    Pulse: 62    Temp: 98 °F (36.7 °C)    TempSrc: Oral    SpO2: 97%    Weight: 93.1 kg (205 lb 2.2 oz)    Height: 6' (1.829 m)           Physical Exam   Constitutional: He is oriented to person, place, and time. He appears well-developed and well-nourished. No distress.   + overweight with Body mass index is 27.82 kg/m².     HENT:   Head: Normocephalic.   Right Ear: External ear normal.   Left Ear: External ear normal.   Nose: Nose normal.   Mouth/Throat: Oropharynx is clear and moist. No oropharyngeal exudate.   Eyes: EOM are normal. Pupils are equal, round, and reactive to light. Right eye exhibits no discharge. Left eye exhibits no discharge. No scleral icterus.   Neck: Normal range of motion. Neck supple. No JVD present. No tracheal deviation present. No thyromegaly present.   Cardiovascular: Normal rate, regular rhythm and normal heart sounds.    No murmur heard.  Pulmonary/Chest: Effort normal and breath sounds normal. No stridor. No respiratory distress.   Abdominal: Soft. He exhibits no distension and no mass. There is no tenderness. There is no guarding.   Musculoskeletal: Normal range of motion. He exhibits no edema.   Lymphadenopathy:     He has no cervical adenopathy.   Neurological: He is alert and oriented to person, place, and time. Coordination normal.   Skin: Skin is warm and dry. No rash noted. He is not diaphoretic.   Psychiatric: He has a normal mood and affect. His behavior is  normal.         Assessment:         ICD-10-CM ICD-9-CM   1. Annual physical exam Z00.00 V70.0   2. Essential hypertension I10 401.9   3. IFG (impaired fasting glucose) R73.01 790.21   4. Anemia, unspecified type D64.9 285.9   5. Elevated PSA, less than 10 ng/ml R97.20 790.93   6. Benign non-nodular prostatic hyperplasia with lower urinary tract symptoms N40.1 600.91       Plan:       Annual physical exam  Health Maintenance Summary     Colonoscopy Next Due 1/1/2021     Done 1/1/2011 Had polyps, done by MD at    Lipid Panel Next Due 4/9/2023     Done 4/9/2018 LIPID PANEL     Done 3/23/2017 LIPID PANEL    TETANUS VACCINE Next Due 1/23/2027     Done 1/23/2017 Imm Admin: Tdap   Influenza Vaccine Addressed     Declined 4/19/2018     Done 1/23/2017 Imm Admin: Influenza - Quadrivalent - PF   Hepatitis C Screening Completed     Done 3/23/2017 HEPATITIS C ANTIBODY       Essential hypertension  -  Start Valsartan 80 mg 1/2 tablet in AM for 7 days and then increase to whole tablet daily.  Follow up in 4 weeks.  -     valsartan (DIOVAN) 80 MG tablet; Take 1 tablet (80 mg total) by mouth once daily.  Dispense: 30 tablet; Refill: 0    IFG (impaired fasting glucose)  -  Advised on lifestyle modifications.  Will get repeat CMP with A1C in 4 weeks.  -     Comprehensive metabolic panel; Future; Expected date: 04/19/2018  -     Hemoglobin A1c; Future; Expected date: 04/19/2018    Anemia, unspecified type  -  Advised on daily Multivitamin.  Will recheck CBC with vitamin levels in 4 weeks.  -     CBC auto differential; Future; Expected date: 04/19/2018  -     Vitamin D; Future; Expected date: 04/19/2018  -     Vitamin B12; Future; Expected date: 04/19/2018  -     Folate; Future; Expected date: 04/19/2018  -     Iron and TIBC; Future; Expected date: 04/19/2018  -     Ferritin; Future; Expected date: 04/19/2018    Elevated PSA, less than 10 ng/ml  -  Dr. Arana notified.    Benign non-nodular prostatic hyperplasia with lower urinary  tract symptoms  -  Followed by Dr. Arana.      Follow-up in about 4 weeks (around 5/17/2018) for fasting labs and office visit.     Patient's Medications   New Prescriptions    VALSARTAN (DIOVAN) 80 MG TABLET    Take 1 tablet (80 mg total) by mouth once daily.   Previous Medications    No medications on file   Modified Medications    No medications on file   Discontinued Medications    No medications on file

## 2018-04-20 DIAGNOSIS — R97.20 ELEVATED PSA, LESS THAN 10 NG/ML: Primary | ICD-10-CM

## 2018-04-20 NOTE — TELEPHONE ENCOUNTER
----- Message from Frieda Cheyenne sent at 4/20/2018  1:33 PM CDT -----  Contact: self, [phone]  Patient called in returning your call. Please advise.

## 2018-04-20 NOTE — TELEPHONE ENCOUNTER
----- Message from Natali Zamora sent at 4/20/2018  3:32 PM CDT -----  Contact: [phone]/self  Patient is returning your call. Please advise.

## 2018-04-23 ENCOUNTER — TELEPHONE (OUTPATIENT)
Dept: UROLOGY | Facility: CLINIC | Age: 58
End: 2018-04-23

## 2018-04-23 NOTE — TELEPHONE ENCOUNTER
----- Message from Josh Kay sent at 4/23/2018  8:27 AM CDT -----  Contact: self/[phone]  He needs to know if his lab work is fasting and if he can come in on any day because in the system it is scheduled on May 10.

## 2018-04-24 ENCOUNTER — TELEPHONE (OUTPATIENT)
Dept: UROLOGY | Facility: CLINIC | Age: 58
End: 2018-04-24

## 2018-04-24 DIAGNOSIS — R97.20 ELEVATED PSA, LESS THAN 10 NG/ML: Primary | ICD-10-CM

## 2018-04-24 NOTE — TELEPHONE ENCOUNTER
----- Message from Maxx Arana MD sent at 4/24/2018  2:09 PM CDT -----  Repeat PSA with 72 hours of abstinence. Orders placed. Please notify pt.

## 2018-05-02 ENCOUNTER — CLINICAL SUPPORT (OUTPATIENT)
Dept: OTOLARYNGOLOGY | Facility: CLINIC | Age: 58
End: 2018-05-02
Payer: COMMERCIAL

## 2018-05-02 ENCOUNTER — OFFICE VISIT (OUTPATIENT)
Dept: OTOLARYNGOLOGY | Facility: CLINIC | Age: 58
End: 2018-05-02
Payer: COMMERCIAL

## 2018-05-02 VITALS
HEIGHT: 72 IN | SYSTOLIC BLOOD PRESSURE: 131 MMHG | BODY MASS INDEX: 27.8 KG/M2 | WEIGHT: 205.25 LBS | HEART RATE: 72 BPM | DIASTOLIC BLOOD PRESSURE: 91 MMHG

## 2018-05-02 DIAGNOSIS — H93.13 TINNITUS AURIUM, BILATERAL: ICD-10-CM

## 2018-05-02 DIAGNOSIS — H90.3 SENSORINEURAL HEARING LOSS (SNHL), BILATERAL: ICD-10-CM

## 2018-05-02 DIAGNOSIS — H90.3 SENSORINEURAL HEARING LOSS, BILATERAL: Primary | ICD-10-CM

## 2018-05-02 DIAGNOSIS — R42 DIZZY: ICD-10-CM

## 2018-05-02 DIAGNOSIS — R42 DIZZINESS: Primary | ICD-10-CM

## 2018-05-02 PROCEDURE — 92567 TYMPANOMETRY: CPT | Mod: S$GLB,,, | Performed by: AUDIOLOGIST-HEARING AID FITTER

## 2018-05-02 PROCEDURE — 99999 PR PBB SHADOW E&M-EST. PATIENT-LVL III: CPT | Mod: PBBFAC,,, | Performed by: OTOLARYNGOLOGY

## 2018-05-02 PROCEDURE — 99243 OFF/OP CNSLTJ NEW/EST LOW 30: CPT | Mod: S$GLB,,, | Performed by: OTOLARYNGOLOGY

## 2018-05-02 PROCEDURE — 92557 COMPREHENSIVE HEARING TEST: CPT | Mod: S$GLB,,, | Performed by: AUDIOLOGIST-HEARING AID FITTER

## 2018-05-02 PROCEDURE — 99999 PR PBB SHADOW E&M-EST. PATIENT-LVL I: CPT | Mod: PBBFAC,,, | Performed by: AUDIOLOGIST-HEARING AID FITTER

## 2018-05-02 NOTE — PROGRESS NOTES
Chief Complaint   Patient presents with    Dizziness     pt referred by Dr Castro    Tinnitus     bilateral   .     HPI:  Mr. Dawkins is a 57 y.o. male referred for possible  dizziness. The problem began 18 months ago. The problem is described as mild. The sensation is characterized as being episodic. The sensation is also described as: lightheaded and off balance. He denies whirling sensation. He states that is occurs when he bends over or gets up from sitting/kneeling to standing. He denies episodes with turning over in bed or lying down.   This episodic sensation lasts minutes. The following associated signs and symptoms are noted: none. The patient and/or caregiver deny the following: loss of consciousness, seizure activity and focal neurological signs. He was referred to evaluate for possible vestibular cause of this dizziness.  He has recently been diagnosed with hypertension and has recently started Diovan 80mg. He notes bilateral tinnitus but states it does not coincide with the dizziness as it is always present. He reports mild bilateral tinnitus. He denies history of noise exposure.         Past Medical History:   Diagnosis Date    Benign non-nodular prostatic hyperplasia with lower urinary tract symptoms 07/19/2016    Followed by Dr. Arana    Essential hypertension 3/9/2018    Urinary tract infection      Social History     Social History    Marital status: Single     Spouse name: N/A    Number of children: N/A    Years of education: N/A     Occupational History    sales      Social History Main Topics    Smoking status: Never Smoker    Smokeless tobacco: Never Used    Alcohol use Yes      Comment: twice a month - 2 drinks per occasion    Drug use: No    Sexual activity: Yes     Partners: Female     Other Topics Concern    Not on file     Social History Narrative    No narrative on file     Past Surgical History:   Procedure Laterality Date    OTHER SURGICAL HISTORY      angiogram on  wrist     Family History   Problem Relation Age of Onset    Cancer Paternal Grandmother     No Known Problems Mother     Heart disease Father      passed age73    Hypertension Father     Hyperlipidemia Father     No Known Problems Sister     No Known Problems Sister     No Known Problems Sister     Kidney disease Neg Hx     Prostate cancer Neg Hx            Review of Systems  General: negative for chills, fever or weight loss  Psychological: negative for mood changes or depression  Ophthalmic: negative for blurry vision, photophobia or eye pain  ENT: see HPI  Respiratory: no cough, shortness of breath, or wheezing  Cardiovascular: no chest pain or dyspnea on exertion  Gastrointestinal: no abdominal pain, change in bowel habits, or black/ bloody stools  Musculoskeletal: negative for gait disturbance or muscular weakness  Neurological: no syncope or seizures; no ataxia  Dermatological: negative for puritis,  rash and jaundice  Hematologic/lymphatic: no easy bruising, no new lumps or bumps      Physical Exam:    Vitals:    05/02/18 1440   BP: (!) 131/91   Pulse: 72       Constitutional: Well appearing / communicating without difficutly.  NAD.  Eyes: EOM I Bilaterally  Head/Face: Normocephalic.  Negative paranasal sinus pressure/tenderness.  Salivary glands WNL.  House Brackmann I Bilaterally.    Right Ear: Auricle normal appearance. External Auditory Canal within normal limits no lesions or masses,TM w/o masses/lesions/perforations. TM mobility noted.   Left Ear: Auricle normal appearance. External Auditory Canal within normal limits no lesions or masses,TM w/o masses/lesions/perforations. TM mobility noted.  Vestibular exam: negative romberg, negative fakuda step test, negative head thrust; negative Rhomberg  negative Dixx-Hallpike test  Nose: No gross nasal septal deviation. Inferior Turbinates 3+ bilaterally. No septal perforation. No masses/lesions. External nasal skin appears normal without  masses/lesions.  Oral Cavity: Gingiva/lips within normal limits.  Dentition/gingiva healthy appearing. Mucus membranes moist. Floor of mouth soft, no masses palpated. Oral Tongue mobile. Hard Palate appears normal.    Oropharynx: Base of tongue appears normal. No masses/lesions noted. Tonsillar fossa/pharyngeal wall without lesions. Posterior oropharynx WNL.  Soft palate without masses. Midline uvula.   Neck/Lymphatic: No LAD I-VI bilaterally.  No thyromegaly.  No masses noted on exam.    Mirror laryngoscopy/nasopharyngoscopy: Active gag reflex.  Unable to perform.    Neuro/Psychiatric: AOx3.  Normal mood and affect.   Cardiovascular: Normal carotid pulses bilaterally, no increasing jugular venous distention noted at cervical region bilaterally.    Respiratory: Normal respiratory effort, no stridor, no retractions noted.          Audiogram reviewed personally by myself and in detail with the patient today.         Assessment:    ICD-10-CM ICD-9-CM    1. Dizziness R42 780.4    2. Sensorineural hearing loss (SNHL), bilateral H90.3 389.18    3. Tinnitus aurium, bilateral H93.13 388.31      The primary encounter diagnosis was Dizziness. Diagnoses of Sensorineural hearing loss (SNHL), bilateral and Tinnitus aurium, bilateral were also pertinent to this visit.      Plan:  No orders of the defined types were placed in this encounter.    His vestibular exam was normal today.  I suspect that the dizziness episodes are more likely due to his new onset HTN.  I have given him vestibular exercises to begin and will see him back in one month to reassess. If he is still having episodes, we will consider VNG testing.     Bilateral SNHL/tinnitus: I discussed with the patient to wear hearing protection when around loud noises. Recommend  annual audiogram. We also discussed that tinnitus is most often caused by a hearing loss, and that as the hair cells are damaged, either genetic or as a result of loud noise exposure, they then cause  tinnitus.  Some patients find that restricting the salt or caffeine in their diet helps.  Tinnitus tends to be louder in times of stress and fatigue, and may decrease with time.  Sound machines may also be an effective masking technique if needed at night.    40 minutes was spent with the patient with more than half of the time spent counseling regarding above.     Thank you kindly for allowing me to participate in the patient's care.       Janelle Cronin MD

## 2018-05-02 NOTE — LETTER
May 2, 2018      [place]  [address] [facility]  [address]  [facility] [address]           [facility]  [address] [place] [address], [address]  [place] [address]  Phone: [phone]  Fax: [phone]          Patient: Diallo Dawkins   MR Number: [id number]   YOB: 1960   Date of Visit: 5/2/2018       Dear Keya Castro:    Thank you for referring Diallo Dawkins to me for evaluation. Attached you will find relevant portions of my assessment and plan of care.    If you have questions, please do not hesitate to call me. I look forward to following Diallo Dawkins along with you.    Sincerely,    Janelle Cronin MD    Enclosure  CC:  No Recipients    If you would like to receive this communication electronically, please contact [email or web address] or [phone] to request more information on [employer] Link access.    For providers and/or their staff who would like to refer a patient to [facility], please contact us through our one-stop-shop provider referral line, [facility], at [phone].    If you feel you have received this communication in error or would no longer like to receive these types of communications, please e-mail [email or web address]

## 2018-05-02 NOTE — PATIENT INSTRUCTIONS
Vestibular Rehabilitation Therapy    You have recently been diagnosed with a vestibular disorder, meaning a weakness within the balance portion of your inner ear(s).     If you have a peripheral vestibular disorder, you probably complain about   dizziness/vertigo/difficulty focusing your eyes with quick head movements or when a lot of motion is around you. Examples:  o Shaking head no  o Being a passenger in a vehicle  o Reading, especially on the computer  o Difficulty walking around in the dark   Unfortunately, moving your head and staying off of your dizziness meds (meclizine or antivert) is the BEST thing you can do for yourself   These exercises are a controlled way of encouraging natural head movements and WILL MAKE YOU BETTER with motivation, hard work, and dedication to the exercises daily but you will experience dizziness along the way. The first 2 weeks are the worst! Most patients report 80% improvement after 2 weeks and 95% improvement after 6-12 weeks.     If you have a central vestibular disorder, you probably complain about   Unsteadiness, imbalance, varying levels of dizziness, lightheadedness, and vertigo, and fuzzy/foggy feelings in the head   These exercises may help you and your symptoms during the program may be similar   BUT, unfortunately your success may not be as great as a patient with only a peripheral problem. Central indicates that there are weaknesses between the brain and the balance systems of the ears, eyes, and/or posture.    Physical therapy for balance retraining and fall prevention is an excellent option for you as well. Contact your PCP for an appropriate referral.    Here are some websites for you and your family to check out:  www.vestibular.org  www.dizziness-and-balance.com          TARGETS    Purpose of Activity: This activity will help you keep your vision stable with large head movements. This type of movement is often needed when changing lanes while  driving.     1. While seated in a comfortable chair, find three objects in the room that are at eye level. One of the objects should be to your far left, one should be in front of you, and one should be on your far right.                        2. Move your head to the left target, then the center target, then the right target, then center, then left. This completes one cycle.   3. Repetitions: Repeat 30 to 40 cycles without stopping at each target.  4. Then repeat 30 to 40 cycles, but stop for one second at each target.  Do once per day, preferably at night before bedtime.             HORIZONTAL HEAD MOVEMENTS    Purpose of Activity: This activity will help you keep your vision stable with head movements. This is similar to watching for a break in traffic.   1. Sit in a comfortable chair with your feet flat on the floor and your hands on your thighs.  2. Keeping your trunk still, quickly turn your head and look to the right, then turn your head and look to the left without stopping in the center, and then look to the center and focus on an object for five seconds. This completes one cycle.   3. For best results, briefly focus your eyes on an object or target to both right and left directions.   4. Repetitions: Repeat 30 to 40 times.  Do once per day, preferably at night before bedtime.   HEAD CIRCLES    Purpose of Activity: This activity will help you keep your vision stable with smaller head movements. This would be similar to standing in an aisle of a store and looking for an item on the shelves.   1. Sit in a comfortable chair and move your head in a circular motion with your eyes open. Let your eyes lead the way. Each full [place] completes one cycle.  2. Repeat step one with your eyes closed.  3. Repetitions: Repeat 30 to 40 times in the clockwise direction.  4. Repeat 30 to 40 times in the counter clockwise direction.  Do once per day, preferably at night before bedtime.   FOCUSING WITH HEAD TURNS    Purpose  of Activity: This activity will help you stabilize your gaze with quick, short head movements. This type of movement is used while driving.   1. Sit in a comfortable chair and bring your index finger or a playing card approximately 10 inches in front of your nose.  2. Focus on your finger or the card while turning your head from side to side. Try not to let the object blur.  3. Gradually increase the speed of the head turns.  4. Repetitions: Repeat 30 to 40 times.   Do once per day, preferably at night before bedtime.   GAIT WITH HEAD MOVEMENT    Purpose of Activity: This activity will help you build stable head movements while walking. This type of movement occurs when walking down the aisle of a grocery store searching for an item.   1. Begin walking at your normal speed. Walk near a wall so that you can reach out to steady yourself if necessary. A hallway is an excellent place for this activity in the beginning.  2. After three steps, turn your head and look to the right while continuing to walk straight ahead.  3. After three more steps, turn your head and look to the left while continuing to walk straight ahead.  4. To increase the difficulty of this task, go from a solid floor to a carpeted floor, or walk outdoors on an uneven surface. Thick lawns usually are the most difficult surface.  5. Repetitions: Repeat 30 to 40 times.   Do once per day, preferably at night before bedtime.

## 2018-05-02 NOTE — PROGRESS NOTES
Hayden Jeronimo, F-AAA  [facility] [address]  [address]  [place] [address]      Patient: Diallo Dawkins   MRN: [id number]    : 1960  HOOPER: 2018    AUDIOLOGICAL EVALUATION    CASE HISTORY:    Diallo Dawkins, 57 y.o., was seen on the above date for an audiological evaluation. Patient reported dizziness for the last 12 months. He described it as episodic and lasting a couple of minutes. His symptoms are exacerbated when he bends his head down. He also mentioned a history of  noise exposure and occasional tinnitus in both ears. No further history significant to hearing loss was reported.    TEST RESULTS:    Otoscopy was difficult to perform as patient had a lot of hair in his ear canals. Imittance testing revealed normal middle ear compliance (Type A) in his right ear and stiff compliance in his left. The left ear result may be an error. Speech reception thresholds were obtained at 10 dB HL bilaterally, which was consistent with pure tone results. Word recognitions scores of 100% were obtained in both ears using a presentation level of 50 dB HL.    IMPRESSION:   Audiological testing indicated that Diallo Dawkins has normal hearing in both ears, except for a mild to moderate hearing impairment from 2K to 4K Hz.    RECOMMENDATIONS:   It is recommended that he:  · Wear hearing protection when around loud noises.  · Have an annual audiogram.  · It is recommended that he follow up with physician for his dizziness.    If you should have any questions or concerns regarding the above information, please do not hesitate to contact me at [phone].      _______________________________  Netta Jeronimo, CCC-A  Clinical Audiologist    enclosure: audiogram

## 2018-05-09 ENCOUNTER — OFFICE VISIT (OUTPATIENT)
Dept: UROLOGY | Facility: CLINIC | Age: 58
End: 2018-05-09
Payer: COMMERCIAL

## 2018-05-09 VITALS
HEIGHT: 72 IN | BODY MASS INDEX: 27.77 KG/M2 | DIASTOLIC BLOOD PRESSURE: 96 MMHG | HEART RATE: 76 BPM | RESPIRATION RATE: 17 BRPM | WEIGHT: 205 LBS | SYSTOLIC BLOOD PRESSURE: 135 MMHG | OXYGEN SATURATION: 98 %

## 2018-05-09 DIAGNOSIS — R31.9 HEMATURIA, UNSPECIFIED TYPE: Primary | ICD-10-CM

## 2018-05-09 DIAGNOSIS — N40.1 BENIGN NON-NODULAR PROSTATIC HYPERPLASIA WITH LOWER URINARY TRACT SYMPTOMS: ICD-10-CM

## 2018-05-09 DIAGNOSIS — R35.1 NOCTURIA: ICD-10-CM

## 2018-05-09 DIAGNOSIS — R97.20 ELEVATED PSA, LESS THAN 10 NG/ML: ICD-10-CM

## 2018-05-09 LAB
BACTERIA #/AREA URNS AUTO: ABNORMAL /HPF
BILIRUB UR QL STRIP: NEGATIVE
CLARITY UR REFRACT.AUTO: ABNORMAL
COLOR UR AUTO: YELLOW
GLUCOSE UR QL STRIP: NEGATIVE
HGB UR QL STRIP: ABNORMAL
KETONES UR QL STRIP: NEGATIVE
LEUKOCYTE ESTERASE UR QL STRIP: ABNORMAL
MICROSCOPIC COMMENT: ABNORMAL
NITRITE UR QL STRIP: NEGATIVE
PH UR STRIP: 5 [PH] (ref 5–8)
PROT UR QL STRIP: NEGATIVE
RBC #/AREA URNS AUTO: 7 /HPF (ref 0–4)
SP GR UR STRIP: 1.02 (ref 1–1.03)
URN SPEC COLLECT METH UR: ABNORMAL
UROBILINOGEN UR STRIP-ACNC: NEGATIVE EU/DL
WBC #/AREA URNS AUTO: 22 /HPF (ref 0–5)

## 2018-05-09 PROCEDURE — 3008F BODY MASS INDEX DOCD: CPT | Mod: CPTII,S$GLB,, | Performed by: UROLOGY

## 2018-05-09 PROCEDURE — 99215 OFFICE O/P EST HI 40 MIN: CPT | Mod: S$GLB,,, | Performed by: UROLOGY

## 2018-05-09 PROCEDURE — 3080F DIAST BP >= 90 MM HG: CPT | Mod: CPTII,S$GLB,, | Performed by: UROLOGY

## 2018-05-09 PROCEDURE — 87086 URINE CULTURE/COLONY COUNT: CPT

## 2018-05-09 PROCEDURE — 81001 URINALYSIS AUTO W/SCOPE: CPT

## 2018-05-09 PROCEDURE — 3075F SYST BP GE 130 - 139MM HG: CPT | Mod: CPTII,S$GLB,, | Performed by: UROLOGY

## 2018-05-09 PROCEDURE — 99999 PR PBB SHADOW E&M-EST. PATIENT-LVL V: CPT | Mod: PBBFAC,,, | Performed by: UROLOGY

## 2018-05-09 RX ORDER — SODIUM CHLORIDE 9 MG/ML
INJECTION, SOLUTION INTRAVENOUS CONTINUOUS
Status: CANCELLED | OUTPATIENT
Start: 2018-05-09

## 2018-05-09 RX ORDER — LIDOCAINE HYDROCHLORIDE 20 MG/ML
JELLY TOPICAL ONCE
Status: CANCELLED | OUTPATIENT
Start: 2018-05-09 | End: 2018-05-09

## 2018-05-09 RX ORDER — CIPROFLOXACIN 2 MG/ML
400 INJECTION, SOLUTION INTRAVENOUS
Status: CANCELLED | OUTPATIENT
Start: 2018-05-09

## 2018-05-09 NOTE — PROGRESS NOTES
Subjective:       Patient ID: Diallo Dawkins is a 57 y.o. male.    Chief Complaint: Elevated PSA    56 yo WM with elevated PSA 7.7 with % free PSA ~11.52%. Recent hematuria (Gross) terminal. Referred for evaluatin from Keya Castro NP. No dysuria or pelvic pain. PSA was 8.7 and decrease to 7.7 with 72 hours of abstinence. PSA 2.5 ng/dl 6/16. No family history of CAP.      Hematuria   This is a new problem. The current episode started 1 to 4 weeks ago. The problem has been waxing and waning since onset. He describes the hematuria as gross hematuria. The hematuria occurs during the terminal portion of his urinary stream. He reports no clotting in his urine stream. His pain is at a severity of 0/10. He is experiencing no pain. He describes his urine color as tea colored. Irritative symptoms include frequency (x 5-6) and nocturia (x 1-2). Irritative symptoms do not include urgency. Pertinent negatives include no abdominal pain, chills, dysuria, facial swelling, fever, flank pain, genital pain, hesitancy, inability to urinate, nausea, vomiting or sore throat. He is sexually active. His past medical history is significant for hypertension. There is no history of  trauma, kidney stones, recent infection, sickle cell disease, STDs, tobacco use or UTI.     Review of Systems   Constitutional: Negative for activity change, appetite change, chills, diaphoresis, fatigue, fever and unexpected weight change.   HENT: Negative for congestion, facial swelling, hearing loss, sinus pressure, sore throat and trouble swallowing.    Eyes: Negative for photophobia, pain, discharge and visual disturbance.   Respiratory: Negative for apnea, cough and shortness of breath.    Cardiovascular: Negative for chest pain, palpitations and leg swelling.   Gastrointestinal: Negative for abdominal distention, abdominal pain, anal bleeding, blood in stool, constipation, diarrhea, nausea, rectal pain and vomiting.   Endocrine: Negative for cold  intolerance, heat intolerance, polydipsia, polyphagia and polyuria.   Genitourinary: Positive for frequency (x 5-6), hematuria and nocturia (x 1-2). Negative for decreased urine volume, difficulty urinating, discharge, dysuria, enuresis, flank pain, genital sores, hesitancy, penile pain, penile swelling, scrotal swelling, testicular pain and urgency.   Musculoskeletal: Negative for arthralgias, back pain and myalgias.   Skin: Negative for color change, pallor, rash and wound.   Allergic/Immunologic: Negative for environmental allergies, food allergies and immunocompromised state.   Neurological: Negative for dizziness, seizures, weakness and headaches.   Hematological: Negative for adenopathy. Does not bruise/bleed easily.   Psychiatric/Behavioral: Negative.        Objective:      Physical Exam   Genitourinary: Rectum normal, testes normal and penis normal. Prostate is enlarged and tender.       Genitourinary Comments: Right mid prostate mushy and tender, possible stones, no discreet nodules.       Assessment:       1. Hematuria, unspecified type    2. Elevated PSA, less than 10 ng/ml    3. Benign non-nodular prostatic hyperplasia with lower urinary tract symptoms    4. Nocturia        Plan:       Patient Instructions   Schedule TRUS prostate biopsy and Cystoscopy and retrograde 5/14/18.  CT abd/pelvis with and without this week  Check Cytology, FISH, U/A and Urine Culture  F/U 3 weeks

## 2018-05-09 NOTE — PATIENT INSTRUCTIONS
Schedule TRUS prostate biopsy and Cystoscopy and retrograde 5/14/18.  CT abd/pelvis with and without this week  Check Cytology, FISH, U/A and Urine Culture  F/U 3 weeks

## 2018-05-09 NOTE — LETTER
May 9, 2018        [place]  [address] [facility]  [address]  [facility] [address]             [place] [facility]  [address]  [facility] [address]  Phone: [phone]  Fax: [phone]   Patient: Diallo Dawkins   MR Number: [id number]   YOB: 1960   Date of Visit: 5/9/2018       Dear Dr. Castro:    Thank you for referring Diallo Dawkins to me for evaluation. Below are the relevant portions of my assessment and plan of care.        No diagnosis found.      Elevated PSA    If you have questions, please do not hesitate to call me. I look forward to following Diallo along with you.    Sincerely,      Maxx Arana MD           CC  No Recipients

## 2018-05-10 ENCOUNTER — PATIENT MESSAGE (OUTPATIENT)
Dept: FAMILY MEDICINE | Facility: CLINIC | Age: 58
End: 2018-05-10

## 2018-05-10 LAB — BACTERIA UR CULT: NO GROWTH

## 2018-05-20 DIAGNOSIS — I10 ESSENTIAL HYPERTENSION: ICD-10-CM

## 2018-05-21 ENCOUNTER — TELEPHONE (OUTPATIENT)
Dept: UROLOGY | Facility: CLINIC | Age: 58
End: 2018-05-21

## 2018-05-21 ENCOUNTER — PATIENT MESSAGE (OUTPATIENT)
Dept: UROLOGY | Facility: CLINIC | Age: 58
End: 2018-05-21

## 2018-05-21 RX ORDER — VALSARTAN 80 MG/1
80 TABLET ORAL DAILY
Qty: 30 TABLET | Refills: 0 | Status: SHIPPED | OUTPATIENT
Start: 2018-05-21 | End: 2018-05-23 | Stop reason: SDUPTHER

## 2018-05-21 NOTE — TELEPHONE ENCOUNTER
----- Message from Ottoniel Richardson sent at 5/21/2018  1:48 PM CDT -----  Contact: [phone]/self  Pt requesting to speak with you concerning his test results   Please call and advise

## 2018-05-22 ENCOUNTER — PATIENT MESSAGE (OUTPATIENT)
Dept: UROLOGY | Facility: CLINIC | Age: 58
End: 2018-05-22

## 2018-05-23 ENCOUNTER — OFFICE VISIT (OUTPATIENT)
Dept: FAMILY MEDICINE | Facility: CLINIC | Age: 58
End: 2018-05-23
Payer: COMMERCIAL

## 2018-05-23 VITALS
BODY MASS INDEX: 27.77 KG/M2 | TEMPERATURE: 98 F | DIASTOLIC BLOOD PRESSURE: 82 MMHG | WEIGHT: 205 LBS | OXYGEN SATURATION: 100 % | SYSTOLIC BLOOD PRESSURE: 124 MMHG | HEIGHT: 72 IN | HEART RATE: 61 BPM

## 2018-05-23 DIAGNOSIS — D51.0 PERNICIOUS ANEMIA: ICD-10-CM

## 2018-05-23 DIAGNOSIS — R53.83 FATIGUE, UNSPECIFIED TYPE: ICD-10-CM

## 2018-05-23 DIAGNOSIS — I10 ESSENTIAL HYPERTENSION: Primary | ICD-10-CM

## 2018-05-23 DIAGNOSIS — R73.01 IFG (IMPAIRED FASTING GLUCOSE): ICD-10-CM

## 2018-05-23 PROCEDURE — 3008F BODY MASS INDEX DOCD: CPT | Mod: CPTII,S$GLB,, | Performed by: NURSE PRACTITIONER

## 2018-05-23 PROCEDURE — 99999 PR PBB SHADOW E&M-EST. PATIENT-LVL IV: CPT | Mod: PBBFAC,,, | Performed by: NURSE PRACTITIONER

## 2018-05-23 PROCEDURE — 3079F DIAST BP 80-89 MM HG: CPT | Mod: CPTII,S$GLB,, | Performed by: NURSE PRACTITIONER

## 2018-05-23 PROCEDURE — 99214 OFFICE O/P EST MOD 30 MIN: CPT | Mod: S$GLB,,, | Performed by: NURSE PRACTITIONER

## 2018-05-23 PROCEDURE — 3074F SYST BP LT 130 MM HG: CPT | Mod: CPTII,S$GLB,, | Performed by: NURSE PRACTITIONER

## 2018-05-23 RX ORDER — VIT C/E/ZN/COPPR/LUTEIN/ZEAXAN 250MG-90MG
1000 CAPSULE ORAL DAILY
Qty: 90 CAPSULE | Refills: 3 | Status: SHIPPED | OUTPATIENT
Start: 2018-05-23 | End: 2019-05-30 | Stop reason: SDUPTHER

## 2018-05-23 RX ORDER — VALSARTAN 80 MG/1
80 TABLET ORAL DAILY
Qty: 90 TABLET | Refills: 1 | Status: SHIPPED | OUTPATIENT
Start: 2018-05-23 | End: 2019-05-10 | Stop reason: SDUPTHER

## 2018-05-23 RX ORDER — LANOLIN ALCOHOL/MO/W.PET/CERES
1000 CREAM (GRAM) TOPICAL DAILY
Qty: 90 TABLET | Refills: 3 | Status: SHIPPED | OUTPATIENT
Start: 2018-05-23 | End: 2019-05-30 | Stop reason: SDUPTHER

## 2018-05-23 NOTE — PROGRESS NOTES
Subjective:       Patient ID: Diallo Dawkins is a 57 y.o. male.    Chief Complaint: Follow-up    Patient has Hypertension and BPH that is here today for follow up.    Patient has Hypertension.  Patient was started on Valsartan 80 mg daily at last visit.  Blood pressure is much improved and now controlled.  /82   Pulse 61   Temp 97.7 °F (36.5 °C) (Oral)   Ht 6' (1.829 m)   Wt 93 kg (205 lb)   SpO2 100%   BMI 27.80 kg/m²     Patient has BPH with elevated PSA that is being followed by Dr. Arana.  Patient reports that he had a prostate biopsy done and learned earlier this week that + for prostate cancer and has follow up with Dr. Arana next week to discuss treatment options.      Patient had a mild anemia on wellness labs and had completed of fatigue.  Before knowing patient had a history of pernicious anemia, I had labs done for workup.  Family later contacted me to report that patient had history of pernicious anemia previously diagnosed.  The CBC shows stable labs since 2016.  His iron and Ferritin levels were normal.  His Vitamin D level was normal but on low end of normal at 35.  His Vitamin B12 level is also normal at 386 but on the lower end of normal - recommended daily supplementation.    Patient has an  with HgbA1C of 5.1%.  Advised on lifestyle modifications.        Component      Latest Ref Rng & Units 5/10/2018 4/9/2018 3/23/2017   WBC      3.90 - 12.70 K/uL 3.97 3.81 (L)    RBC      4.60 - 6.20 M/uL 4.40 (L) 4.12 (L)    Hemoglobin      14.0 - 18.0 g/dL 14.0 13.2 (L)    Hematocrit      40.0 - 54.0 % 38.9 (L) 36.9 (L)    MCV      82 - 98 fL 88 90    MCH      27.0 - 31.0 pg 31.8 (H) 32.0 (H)    MCHC      32.0 - 36.0 g/dL 36.0 35.8    RDW      11.5 - 14.5 % 13.6 14.4    Platelets      150 - 350 K/uL 182 199    MPV      9.2 - 12.9 fL 10.6 9.6    Gran # (ANC)      1.8 - 7.7 K/uL 2.2 1.9    Lymph #      1.0 - 4.8 K/uL 1.3 1.3    Mono #      0.3 - 1.0 K/uL 0.4 0.4    Eos #      0.0 - 0.5  K/uL 0.1 0.1    Baso #      0.00 - 0.20 K/uL 0.01 0.02    Gran%      38.0 - 73.0 % 54.6 50.6    Lymph%      18.0 - 48.0 % 33.5 35.2    Mono%      4.0 - 15.0 % 9.6 11.3    Eosinophil%      0.0 - 8.0 % 2.0 2.4    Basophil%      0.0 - 1.9 % 0.3 0.5    Differential Method       Automated Automated    Sodium      136 - 145 mmol/L 141 143    Potassium      3.5 - 5.1 mmol/L 4.1 4.3    Chloride      95 - 110 mmol/L 105 105    CO2      23 - 29 mmol/L 25 28    Glucose      70 - 110 mg/dL 116 (H) 121 (H)    BUN, Bld      2 - 20 mg/dL 20 19    Creatinine      0.50 - 1.40 mg/dL 0.84 0.92    Calcium      8.7 - 10.5 mg/dL 9.1 9.1    Total Protein      6.0 - 8.4 g/dL 7.2 7.3    Albumin      3.5 - 5.2 g/dL 4.2 4.3    Total Bilirubin      0.1 - 1.0 mg/dL 0.5 0.7    Alkaline Phosphatase      38 - 126 U/L 51 50    AST      15 - 46 U/L 26 30    ALT      10 - 44 U/L 25 35    Anion Gap      8 - 16 mmol/L 11 10    eGFR if African American      >60 mL/min/1.73 m:2 >60.0 >60.0    eGFR if non African American      >60 mL/min/1.73 m:2 >60.0 >60.0    Cholesterol      120 - 199 mg/dL  162 152   Triglycerides      30 - 150 mg/dL  88 140   HDL      40 - 75 mg/dL  37 (L) 31 (L)   LDL Cholesterol      63.0 - 159.0 mg/dL  107.4 93.0   HDL/Chol Ratio      20.0 - 50.0 %  22.8 20.4   Total Cholesterol/HDL Ratio      2.0 - 5.0  4.4 4.9   Non-HDL Cholesterol      mg/dL  125 121   Iron      45 - 160 ug/dL 92     Transferrin      200 - 375 mg/dL 251     TIBC      250 - 450 ug/dL 371     Saturated Iron      20 - 50 % 25     Hemoglobin A1C      4.0 - 5.6 % 5.1     Estimated Avg Glucose      68 - 131 mg/dL 100     TSH      0.400 - 4.000 uIU/mL  1.890    Vit D, 25-Hydroxy      30 - 96 ng/mL 35     Vitamin B-12      210 - 950 pg/mL 386     Folate      4.0 - 24.0 ng/mL 7.1     Ferritin      20.0 - 300.0 ng/mL 36       Previous Medications    SUPREP BOWEL PREP KIT 17.5-3.13-1.6 GRAM SOLR        VALSARTAN (DIOVAN) 80 MG TABLET    TAKE 1 TABLET (80 MG TOTAL) BY  MOUTH ONCE DAILY.       Past Medical History:   Diagnosis Date    Benign non-nodular prostatic hyperplasia with lower urinary tract symptoms 07/19/2016    Followed by Dr. Arana    Elevated PSA, less than 10 ng/ml 5/9/2018    Essential hypertension 3/9/2018    Pernicious anemia     per patient report    Urinary tract infection        Past Surgical History:   Procedure Laterality Date    OTHER SURGICAL HISTORY      angiogram on wrist       Family History   Problem Relation Age of Onset    Cancer Paternal Grandmother     No Known Problems Mother     Heart disease Father         passed age75    Hypertension Father     Hyperlipidemia Father     No Known Problems Sister     No Known Problems Sister     No Known Problems Sister     Kidney disease Neg Hx     Prostate cancer Neg Hx        Social History     Social History    Marital status: Single     Spouse name: N/A    Number of children: N/A    Years of education: N/A     Occupational History    sales      Social History Main Topics    Smoking status: Never Smoker    Smokeless tobacco: Never Used    Alcohol use Yes      Comment: twice a month - 2 drinks per occasion    Drug use: No    Sexual activity: Yes     Partners: Female     Other Topics Concern    None     Social History Narrative    None       Review of Systems   Constitutional: Positive for fatigue. Negative for activity change, appetite change, fever and unexpected weight change.   HENT: Negative for congestion, ear pain, mouth sores, nosebleeds, postnasal drip, rhinorrhea, sinus pressure, sneezing, sore throat, trouble swallowing and voice change.    Eyes: Negative.    Respiratory: Negative for cough, chest tightness and shortness of breath.    Cardiovascular: Negative for chest pain, palpitations and leg swelling.   Gastrointestinal: Negative.  Negative for abdominal pain, blood in stool, constipation, diarrhea, nausea and vomiting.   Endocrine: Negative.    Genitourinary: Negative  for difficulty urinating, dysuria, flank pain, hematuria and urgency.   Musculoskeletal: Negative for arthralgias, back pain, gait problem, joint swelling, myalgias and neck pain.   Skin: Negative for color change, rash and wound.   Allergic/Immunologic: Negative for immunocompromised state.   Neurological: Negative for dizziness, tremors, seizures, syncope, speech difficulty and headaches.   Hematological: Negative for adenopathy. Does not bruise/bleed easily.   Psychiatric/Behavioral: Negative for behavioral problems, dysphoric mood, sleep disturbance and suicidal ideas. The patient is not nervous/anxious.          Objective:     Vitals:    05/23/18 1543   BP: 120/86   BP Location: Right arm   Patient Position: Sitting   BP Method: Medium (Manual)   Pulse: 61   Temp: 97.7 °F (36.5 °C)   TempSrc: Oral   SpO2: 100%   Weight: 93 kg (205 lb)   Height: 6' (1.829 m)          Physical Exam   Constitutional: He is oriented to person, place, and time. He appears well-developed and well-nourished. No distress.   + overweight with Body mass index is 27.8 kg/m².       HENT:   Head: Normocephalic.   Right Ear: External ear normal.   Left Ear: External ear normal.   Nose: Nose normal.   Mouth/Throat: Oropharynx is clear and moist. No oropharyngeal exudate.   Eyes: EOM are normal. Pupils are equal, round, and reactive to light. Right eye exhibits no discharge. Left eye exhibits no discharge. No scleral icterus.   Neck: Normal range of motion. Neck supple. No JVD present. No tracheal deviation present. No thyromegaly present.   Cardiovascular: Normal rate, regular rhythm and normal heart sounds.    No murmur heard.  Pulmonary/Chest: Effort normal and breath sounds normal. No stridor. No respiratory distress.   Abdominal: Soft. He exhibits no distension and no mass. There is no tenderness. There is no guarding.   Musculoskeletal: Normal range of motion. He exhibits no edema.   Lymphadenopathy:     He has no cervical adenopathy.    Neurological: He is alert and oriented to person, place, and time. Coordination normal.   Skin: Skin is warm and dry. No rash noted. He is not diaphoretic.   Psychiatric: He has a normal mood and affect. His behavior is normal.         Assessment:         ICD-10-CM ICD-9-CM   1. Essential hypertension I10 401.9   2. IFG (impaired fasting glucose) R73.01 790.21   3. Pernicious anemia D51.0 281.0   4. Fatigue, unspecified type R53.83 780.79       Plan:       Essential hypertension  -  Controlled on present medication - recheck in 6 months.  -     valsartan (DIOVAN) 80 MG tablet; Take 1 tablet (80 mg total) by mouth once daily.  Dispense: 90 tablet; Refill: 1    IFG (impaired fasting glucose)  -  Advised on lifestyle modifications.    Pernicious anemia  -  CBC improved and stable from previous labs.    Fatigue, unspecified type  -  Recommended supplementation below.  -     cholecalciferol, vitamin D3, 1,000 unit capsule; Take 1 capsule (1,000 Units total) by mouth once daily.  Dispense: 90 capsule; Refill: 3  -     cyanocobalamin (VITAMIN B-12) 1000 MCG tablet; Take 1 tablet (1,000 mcg total) by mouth once daily.  Dispense: 90 tablet; Refill: 3      Follow-up in about 6 months (around 11/23/2018) for blood pressure check.     Patient's Medications   New Prescriptions    CHOLECALCIFEROL, VITAMIN D3, 1,000 UNIT CAPSULE    Take 1 capsule (1,000 Units total) by mouth once daily.    CYANOCOBALAMIN (VITAMIN B-12) 1000 MCG TABLET    Take 1 tablet (1,000 mcg total) by mouth once daily.   Previous Medications    SUPREP BOWEL PREP KIT 17.5-3.13-1.6 GRAM SOLR       Modified Medications    Modified Medication Previous Medication    VALSARTAN (DIOVAN) 80 MG TABLET valsartan (DIOVAN) 80 MG tablet       Take 1 tablet (80 mg total) by mouth once daily.    TAKE 1 TABLET (80 MG TOTAL) BY MOUTH ONCE DAILY.   Discontinued Medications    DOXYCYCLINE (VIBRAMYCIN) 50 MG CAPSULE    Take 50 mg by mouth 2 (two) times daily.      HYDROCODONE-APAP (NORCO) 5-325 MG PER TABLET    Take 1 tablet by mouth every 6 (six) hours as needed for Pain.

## 2018-05-29 ENCOUNTER — PATIENT MESSAGE (OUTPATIENT)
Dept: UROLOGY | Facility: CLINIC | Age: 58
End: 2018-05-29

## 2018-06-06 ENCOUNTER — OFFICE VISIT (OUTPATIENT)
Dept: UROLOGY | Facility: CLINIC | Age: 58
End: 2018-06-06
Payer: COMMERCIAL

## 2018-06-06 VITALS
SYSTOLIC BLOOD PRESSURE: 129 MMHG | BODY MASS INDEX: 27.77 KG/M2 | WEIGHT: 205 LBS | HEIGHT: 72 IN | OXYGEN SATURATION: 98 % | RESPIRATION RATE: 17 BRPM | DIASTOLIC BLOOD PRESSURE: 78 MMHG | HEART RATE: 75 BPM

## 2018-06-06 DIAGNOSIS — R97.20 ELEVATED PSA, LESS THAN 10 NG/ML: ICD-10-CM

## 2018-06-06 DIAGNOSIS — N40.1 BENIGN NON-NODULAR PROSTATIC HYPERPLASIA WITH LOWER URINARY TRACT SYMPTOMS: Primary | ICD-10-CM

## 2018-06-06 DIAGNOSIS — D49.59 NEOPLASM OF PROSTATE: ICD-10-CM

## 2018-06-06 DIAGNOSIS — R36.1 HEMATOSPERMIA: ICD-10-CM

## 2018-06-06 DIAGNOSIS — R31.0 GROSS HEMATURIA: ICD-10-CM

## 2018-06-06 PROCEDURE — 3074F SYST BP LT 130 MM HG: CPT | Mod: CPTII,S$GLB,, | Performed by: UROLOGY

## 2018-06-06 PROCEDURE — 3008F BODY MASS INDEX DOCD: CPT | Mod: CPTII,S$GLB,, | Performed by: UROLOGY

## 2018-06-06 PROCEDURE — 99999 PR PBB SHADOW E&M-EST. PATIENT-LVL IV: CPT | Mod: PBBFAC,,, | Performed by: UROLOGY

## 2018-06-06 PROCEDURE — 99215 OFFICE O/P EST HI 40 MIN: CPT | Mod: S$GLB,,, | Performed by: UROLOGY

## 2018-06-06 PROCEDURE — 3078F DIAST BP <80 MM HG: CPT | Mod: CPTII,S$GLB,, | Performed by: UROLOGY

## 2018-06-06 RX ORDER — DOXYCYCLINE HYCLATE 50 MG/1
CAPSULE ORAL
COMMUNITY
Start: 2018-06-03 | End: 2018-11-09

## 2018-06-06 NOTE — PROGRESS NOTES
Subjective:       Patient ID: Diallo Dawkins is a 57 y.o. male.    Chief Complaint: Post-op Evaluation (valsartin)    58 yo WM presents with wife to discuss CAP treatment. Recent Oncotype dx shows GPS 30 with low risk disease but upward of median range.       Other   This is a new problem. The current episode started more than 1 month ago. The problem occurs constantly. The problem has been unchanged. Pertinent negatives include no abdominal pain, anorexia, arthralgias, change in bowel habit, chest pain, chills, congestion, coughing, diaphoresis, fatigue, fever, headaches, joint swelling, myalgias, nausea, neck pain, numbness, rash, sore throat, swollen glands, urinary symptoms, vertigo, visual change, vomiting or weakness. Associated symptoms comments: Hematospermia. Nothing aggravates the symptoms. He has tried nothing for the symptoms.     Review of Systems   Constitutional: Negative for activity change, appetite change, chills, diaphoresis, fatigue, fever and unexpected weight change.   HENT: Negative for congestion, hearing loss, sinus pressure, sore throat and trouble swallowing.    Eyes: Negative for photophobia, pain, discharge and visual disturbance.   Respiratory: Negative for apnea, cough and shortness of breath.    Cardiovascular: Negative for chest pain, palpitations and leg swelling.   Gastrointestinal: Negative for abdominal distention, abdominal pain, anal bleeding, anorexia, blood in stool, change in bowel habit, constipation, diarrhea, nausea, rectal pain and vomiting.   Endocrine: Negative for cold intolerance, heat intolerance, polydipsia, polyphagia and polyuria.   Genitourinary: Negative for decreased urine volume, difficulty urinating, discharge, dysuria, enuresis, flank pain, frequency, genital sores, hematuria, penile pain, penile swelling, scrotal swelling, testicular pain and urgency.   Musculoskeletal: Negative for arthralgias, back pain, joint swelling, myalgias and neck pain.    Skin: Negative for color change, pallor, rash and wound.   Allergic/Immunologic: Negative for environmental allergies, food allergies and immunocompromised state.   Neurological: Negative for dizziness, vertigo, seizures, weakness, numbness and headaches.   Hematological: Negative for adenopathy. Does not bruise/bleed easily.   Psychiatric/Behavioral: Negative.        Objective:      Physical Exam   Nursing note and vitals reviewed.  Constitutional: He is oriented to person, place, and time. He appears well-developed and well-nourished.   HENT:   Head: Normocephalic.   Nose: Nose normal.   Mouth/Throat: Oropharynx is clear and moist.   Eyes: Conjunctivae and EOM are normal. Pupils are equal, round, and reactive to light.   Neck: Normal range of motion. Neck supple.   Cardiovascular: Normal rate, regular rhythm, normal heart sounds and intact distal pulses.    Pulmonary/Chest: Effort normal and breath sounds normal.   Abdominal: Soft. Bowel sounds are normal.   Musculoskeletal: Normal range of motion.   Neurological: He is alert and oriented to person, place, and time. He has normal reflexes.   Skin: Skin is warm and dry.     Psychiatric: He has a normal mood and affect. His behavior is normal. Judgment and thought content normal.       Assessment:       1. Benign non-nodular prostatic hyperplasia with lower urinary tract symptoms    2. Hematospermia    3. Elevated PSA, less than 10 ng/ml    4. Gross hematuria    5. Neoplasm of prostate      [id number] clinical CAP - PSA 4.4, GS 3+3=6, Discussed AS, IMRT, Brachytherapy, RRP, RARP ,H/A, Chemo, Cryo, HIFU and WW. Discussion, exam lasted 70 minutes  Plan:     Plan Modified AS plan with MRI Baseline and quarterly PSA and PARISH  Patient Instructions   F/U 3 mo with PSA and PARISH.  Baseline prostate MRI

## 2018-06-13 ENCOUNTER — HOSPITAL ENCOUNTER (OUTPATIENT)
Dept: RADIOLOGY | Facility: HOSPITAL | Age: 58
Discharge: HOME | End: 2018-06-13
Attending: UROLOGY
Payer: COMMERCIAL

## 2018-06-13 DIAGNOSIS — D49.59 NEOPLASM OF PROSTATE: ICD-10-CM

## 2018-06-13 PROCEDURE — 25500020 PHARM REV CODE 255: Performed by: UROLOGY

## 2018-06-13 PROCEDURE — 72197 MRI PELVIS W/O & W/DYE: CPT | Mod: 26,,, | Performed by: RADIOLOGY

## 2018-06-13 PROCEDURE — 72197 MRI PELVIS W/O & W/DYE: CPT | Mod: TC

## 2018-06-13 PROCEDURE — A9585 GADOBUTROL INJECTION: HCPCS | Performed by: UROLOGY

## 2018-06-13 RX ORDER — GADOBUTROL 604.72 MG/ML
10 INJECTION INTRAVENOUS
Status: COMPLETED | OUTPATIENT
Start: 2018-06-13 | End: 2018-06-13

## 2018-06-13 RX ADMIN — GADOBUTROL 10 ML: 604.72 INJECTION INTRAVENOUS at 07:06

## 2018-06-15 ENCOUNTER — PATIENT MESSAGE (OUTPATIENT)
Dept: UROLOGY | Facility: CLINIC | Age: 58
End: 2018-06-15

## 2018-06-15 DIAGNOSIS — C61 PROSTATE CANCER: Primary | ICD-10-CM

## 2018-08-02 ENCOUNTER — TELEPHONE (OUTPATIENT)
Dept: UROLOGY | Facility: CLINIC | Age: 58
End: 2018-08-02

## 2018-08-02 NOTE — TELEPHONE ENCOUNTER
----- Message from Maxx Arana MD sent at 8/1/2018  4:41 PM CDT -----  Please make appt so we can discuss pathology report from oncotype-GPS. Looks pretty good but would like to discuss with you.

## 2018-08-13 ENCOUNTER — PATIENT MESSAGE (OUTPATIENT)
Dept: UROLOGY | Facility: CLINIC | Age: 58
End: 2018-08-13

## 2018-08-31 ENCOUNTER — OFFICE VISIT (OUTPATIENT)
Dept: UROLOGY | Facility: CLINIC | Age: 58
End: 2018-08-31
Payer: COMMERCIAL

## 2018-08-31 VITALS — WEIGHT: 205 LBS | BODY MASS INDEX: 27.77 KG/M2 | HEIGHT: 72 IN

## 2018-08-31 DIAGNOSIS — C61 PROSTATE CANCER: Primary | ICD-10-CM

## 2018-08-31 DIAGNOSIS — N40.1 BENIGN NON-NODULAR PROSTATIC HYPERPLASIA WITH LOWER URINARY TRACT SYMPTOMS: ICD-10-CM

## 2018-08-31 PROCEDURE — 3008F BODY MASS INDEX DOCD: CPT | Mod: CPTII,S$GLB,, | Performed by: UROLOGY

## 2018-08-31 PROCEDURE — 99214 OFFICE O/P EST MOD 30 MIN: CPT | Mod: S$GLB,,, | Performed by: UROLOGY

## 2018-08-31 PROCEDURE — 99999 PR PBB SHADOW E&M-EST. PATIENT-LVL III: CPT | Mod: PBBFAC,,, | Performed by: UROLOGY

## 2018-08-31 NOTE — PROGRESS NOTES
Subjective:       Patient ID: Diallo Dawkins is a 58 y.o. male.    Chief Complaint: Other (follow up)    57 yo WM with history of CAP undergoing active surveillance. PSA down to 3.1. Here for f/u discussion.      Other   This is a chronic (Prostate Cancer Active Surveillance) problem. The current episode started more than 1 month ago. The problem occurs constantly. The problem has been unchanged. Pertinent negatives include no abdominal pain, anorexia, arthralgias, change in bowel habit, chest pain, chills, congestion, coughing, diaphoresis, fatigue, fever, headaches, joint swelling, myalgias, nausea, neck pain, numbness, rash, sore throat, swollen glands, urinary symptoms, vertigo, visual change, vomiting or weakness. Nothing aggravates the symptoms. He has tried nothing for the symptoms.     Review of Systems   Constitutional: Negative for activity change, appetite change, chills, diaphoresis, fatigue, fever and unexpected weight change.   HENT: Negative for congestion, hearing loss, sinus pressure, sore throat and trouble swallowing.    Eyes: Negative for photophobia, pain, discharge and visual disturbance.   Respiratory: Negative for apnea, cough and shortness of breath.    Cardiovascular: Negative for chest pain, palpitations and leg swelling.   Gastrointestinal: Negative for abdominal distention, abdominal pain, anal bleeding, anorexia, blood in stool, change in bowel habit, constipation, diarrhea, nausea, rectal pain and vomiting.   Endocrine: Negative for cold intolerance, heat intolerance, polydipsia, polyphagia and polyuria.   Genitourinary: Negative for decreased urine volume, difficulty urinating, discharge, dysuria, enuresis, flank pain, frequency, genital sores, hematuria, penile pain, penile swelling, scrotal swelling, testicular pain and urgency.   Musculoskeletal: Negative for arthralgias, back pain, joint swelling, myalgias and neck pain.   Skin: Negative for color change, pallor, rash and  wound.   Allergic/Immunologic: Negative for environmental allergies, food allergies and immunocompromised state.   Neurological: Negative for dizziness, vertigo, seizures, weakness, numbness and headaches.   Hematological: Negative for adenopathy. Does not bruise/bleed easily.   Psychiatric/Behavioral: Negative.        Objective:      Physical Exam   Nursing note and vitals reviewed.  Constitutional: He is oriented to person, place, and time. He appears well-developed and well-nourished.   HENT:   Head: Normocephalic.   Nose: Nose normal.   Mouth/Throat: Oropharynx is clear and moist.   Eyes: Conjunctivae and EOM are normal. Pupils are equal, round, and reactive to light.   Neck: Normal range of motion. Neck supple.   Cardiovascular: Normal rate, regular rhythm, normal heart sounds and intact distal pulses.    Pulmonary/Chest: Effort normal and breath sounds normal.   Abdominal: Soft. Bowel sounds are normal.   Musculoskeletal: Normal range of motion.   Neurological: He is alert and oriented to person, place, and time. He has normal reflexes.   Skin: Skin is warm and dry.     Psychiatric: He has a normal mood and affect. His behavior is normal. Judgment and thought content normal.       Assessment:       1. Prostate cancer    2. Benign non-nodular prostatic hyperplasia with lower urinary tract symptoms        Plan:           Patient Instructions   F/U 3 months with PSA

## 2018-11-07 ENCOUNTER — TELEPHONE (OUTPATIENT)
Dept: FAMILY MEDICINE | Facility: CLINIC | Age: 58
End: 2018-11-07

## 2018-11-09 ENCOUNTER — OFFICE VISIT (OUTPATIENT)
Dept: FAMILY MEDICINE | Facility: CLINIC | Age: 58
End: 2018-11-09
Payer: COMMERCIAL

## 2018-11-09 ENCOUNTER — TELEPHONE (OUTPATIENT)
Dept: FAMILY MEDICINE | Facility: CLINIC | Age: 58
End: 2018-11-09

## 2018-11-09 VITALS
BODY MASS INDEX: 27.8 KG/M2 | TEMPERATURE: 98 F | SYSTOLIC BLOOD PRESSURE: 120 MMHG | DIASTOLIC BLOOD PRESSURE: 82 MMHG | HEART RATE: 78 BPM | WEIGHT: 205 LBS

## 2018-11-09 DIAGNOSIS — I10 ESSENTIAL HYPERTENSION: ICD-10-CM

## 2018-11-09 PROCEDURE — 3008F BODY MASS INDEX DOCD: CPT | Mod: CPTII,S$GLB,, | Performed by: INTERNAL MEDICINE

## 2018-11-09 PROCEDURE — 99213 OFFICE O/P EST LOW 20 MIN: CPT | Mod: S$GLB,,, | Performed by: INTERNAL MEDICINE

## 2018-11-09 PROCEDURE — 3079F DIAST BP 80-89 MM HG: CPT | Mod: CPTII,S$GLB,, | Performed by: INTERNAL MEDICINE

## 2018-11-09 PROCEDURE — 99999 PR PBB SHADOW E&M-EST. PATIENT-LVL III: CPT | Mod: PBBFAC,,, | Performed by: INTERNAL MEDICINE

## 2018-11-09 PROCEDURE — 3074F SYST BP LT 130 MM HG: CPT | Mod: CPTII,S$GLB,, | Performed by: INTERNAL MEDICINE

## 2018-11-09 RX ORDER — ZOSTER VACCINE RECOMBINANT, ADJUVANTED 50 MCG/0.5
KIT INTRAMUSCULAR
Refills: 0 | COMMUNITY
Start: 2018-10-04 | End: 2018-11-09

## 2018-11-09 RX ORDER — CIPROFLOXACIN 500 MG/1
500 TABLET ORAL 2 TIMES DAILY
Qty: 14 TABLET | Refills: 0 | Status: SHIPPED | OUTPATIENT
Start: 2018-11-09 | End: 2018-11-16

## 2018-11-09 NOTE — PATIENT INSTRUCTIONS
We have reviewed your prescription medications. I suspect that you have food poisoning. I am going to treat you with cipro for a week. You can also take peptobismol for symptoms if needed. You should be ok to travel, just be sure to stay hydrated.

## 2018-11-10 NOTE — PROGRESS NOTES
Subjective:       Patient ID: Diallo Dawkins is a 58 y.o. male.    Chief Complaint: Upset stomach (since monday) and Nausea     I have reviewed the PMH,  and  for this patient. This is a new patient to me. He presents today for evaluation of nausea that has been going on x 5 days. He was at a family gathering last weekend where they served hot dogs and chili. He has learned that several people who were present at the gathering have also been having stomach issues. He is supposed to go on a cruise tomorrow. HE also has a history of hypertension which is well-controlled.       Nausea   This is a new problem. The current episode started in the past 7 days. The problem occurs constantly. The problem has been gradually improving. Associated symptoms include abdominal pain, a change in bowel habit, fatigue and nausea. Pertinent negatives include no anorexia, arthralgias, chest pain, chills, congestion, coughing, diaphoresis, fever, headaches, joint swelling, myalgias, neck pain, numbness, rash, sore throat, swollen glands, urinary symptoms, vertigo, visual change, vomiting or weakness. The symptoms are aggravated by eating. Treatments tried: ppi's. The treatment provided mild relief.     Review of Systems   Constitutional: Positive for fatigue. Negative for chills, diaphoresis and fever.   HENT: Negative for congestion, ear discharge, ear pain, rhinorrhea and sore throat.    Eyes: Negative for discharge, redness and itching.   Respiratory: Negative for cough, chest tightness, shortness of breath and wheezing.    Cardiovascular: Negative for chest pain, palpitations and leg swelling.   Gastrointestinal: Positive for abdominal pain, change in bowel habit and nausea. Negative for anorexia, constipation, diarrhea and vomiting.   Endocrine: Negative for cold intolerance and heat intolerance.   Genitourinary: Negative for dysuria, flank pain, frequency and hematuria.   Musculoskeletal: Negative for arthralgias, back  pain, joint swelling, myalgias and neck pain.   Skin: Negative for color change and rash.   Neurological: Negative for dizziness, vertigo, tremors, weakness, numbness and headaches.   Psychiatric/Behavioral: Negative for dysphoric mood and sleep disturbance. The patient is not nervous/anxious.        Objective:      Physical Exam   Constitutional: He appears well-developed and well-nourished.   HENT:   Head: Normocephalic and atraumatic.   Right Ear: External ear normal. Tympanic membrane is not erythematous. No middle ear effusion.   Left Ear: External ear normal. Tympanic membrane is not erythematous.  No middle ear effusion.   Mouth/Throat: No posterior oropharyngeal edema or posterior oropharyngeal erythema. No tonsillar exudate.   Eyes: Conjunctivae and EOM are normal. Pupils are equal, round, and reactive to light.   Neck: No thyromegaly present.   Cardiovascular: Normal rate, regular rhythm, normal heart sounds and intact distal pulses.   No murmur heard.  Pulmonary/Chest: Effort normal and breath sounds normal. He has no wheezes.   Abdominal: He exhibits no distension. There is no tenderness.   Musculoskeletal: He exhibits no edema or tenderness.   Lymphadenopathy:     He has no cervical adenopathy.   Neurological: He displays normal reflexes. No cranial nerve deficit.   Skin: Skin is warm and dry. No rash noted.   Psychiatric: He has a normal mood and affect. His behavior is normal.       Assessment and Plan:     Problem List Items Addressed This Visit        Cardiac/Vascular    Essential hypertension - bp looks good on current medications.       Other Visit Diagnoses     Food poisoning, accidental or unintentional, initial encounter    -  Primary - let's treat with cipro since there is good evidence for food poisoning. Stay hydrated. See ships doctor if not getting better . Also, take pepto-bismol as needed for nausea/heartburn.

## 2018-11-23 ENCOUNTER — PATIENT MESSAGE (OUTPATIENT)
Dept: UROLOGY | Facility: CLINIC | Age: 58
End: 2018-11-23

## 2018-12-05 ENCOUNTER — PATIENT MESSAGE (OUTPATIENT)
Dept: FAMILY MEDICINE | Facility: CLINIC | Age: 58
End: 2018-12-05

## 2019-01-22 ENCOUNTER — PATIENT MESSAGE (OUTPATIENT)
Dept: UROLOGY | Facility: CLINIC | Age: 59
End: 2019-01-22

## 2019-02-01 ENCOUNTER — OFFICE VISIT (OUTPATIENT)
Dept: UROLOGY | Facility: CLINIC | Age: 59
End: 2019-02-01
Payer: COMMERCIAL

## 2019-02-01 VITALS — WEIGHT: 205 LBS | HEIGHT: 72 IN | BODY MASS INDEX: 27.77 KG/M2

## 2019-02-01 DIAGNOSIS — C61 PROSTATE CANCER: ICD-10-CM

## 2019-02-01 DIAGNOSIS — N40.1 BENIGN NON-NODULAR PROSTATIC HYPERPLASIA WITH LOWER URINARY TRACT SYMPTOMS: Primary | ICD-10-CM

## 2019-02-01 DIAGNOSIS — R97.20 ELEVATED PSA, LESS THAN 10 NG/ML: ICD-10-CM

## 2019-02-01 PROCEDURE — 99999 PR PBB SHADOW E&M-EST. PATIENT-LVL III: ICD-10-PCS | Mod: PBBFAC,,, | Performed by: UROLOGY

## 2019-02-01 PROCEDURE — 3008F PR BODY MASS INDEX (BMI) DOCUMENTED: ICD-10-PCS | Mod: CPTII,S$GLB,, | Performed by: UROLOGY

## 2019-02-01 PROCEDURE — 3008F BODY MASS INDEX DOCD: CPT | Mod: CPTII,S$GLB,, | Performed by: UROLOGY

## 2019-02-01 PROCEDURE — 99214 PR OFFICE/OUTPT VISIT, EST, LEVL IV, 30-39 MIN: ICD-10-PCS | Mod: S$GLB,,, | Performed by: UROLOGY

## 2019-02-01 PROCEDURE — 99999 PR PBB SHADOW E&M-EST. PATIENT-LVL III: CPT | Mod: PBBFAC,,, | Performed by: UROLOGY

## 2019-02-01 PROCEDURE — 99214 OFFICE O/P EST MOD 30 MIN: CPT | Mod: S$GLB,,, | Performed by: UROLOGY

## 2019-02-01 RX ORDER — OMEPRAZOLE 20 MG/1
CAPSULE, DELAYED RELEASE ORAL
COMMUNITY
Start: 2019-01-24 | End: 2020-03-02 | Stop reason: SDUPTHER

## 2019-02-01 NOTE — PROGRESS NOTES
Subjective:       Patient ID: Diallo Dawkins is a 58 y.o. male.    Chief Complaint: Benign Prostatic Hypertrophy and Prostate Cancer    57 yo WM with history of CAP undergoing active surveillance. PSA down to 0.82 ng/ml from 3.2. Undergoing Active Surveillance. Discussed moving f/u to q 6 months and adjusting as needed.      Benign Prostatic Hypertrophy   This is a chronic problem. The current episode started more than 1 month ago. The problem is unchanged. Irritative symptoms do not include frequency, nocturia or urgency. Obstructive symptoms do not include dribbling, incomplete emptying, an intermittent stream, a slower stream, straining or a weak stream. Pertinent negatives include no chills, dysuria, genital pain, hematuria, hesitancy, nausea or vomiting. AUA score is 0-7. He is sexually active. Nothing aggravates the symptoms. Past treatments include nothing.     Review of Systems   Constitutional: Negative for activity change, appetite change, chills, diaphoresis, fatigue, fever and unexpected weight change.   HENT: Negative for congestion, hearing loss, sinus pressure and trouble swallowing.    Eyes: Negative for photophobia, pain, discharge and visual disturbance.   Respiratory: Negative for apnea, cough and shortness of breath.    Cardiovascular: Negative for chest pain, palpitations and leg swelling.   Gastrointestinal: Negative for abdominal distention, abdominal pain, anal bleeding, blood in stool, constipation, diarrhea, nausea, rectal pain and vomiting.   Endocrine: Negative for cold intolerance, heat intolerance, polydipsia, polyphagia and polyuria.   Genitourinary: Negative for decreased urine volume, difficulty urinating, discharge, dysuria, enuresis, flank pain, frequency, genital sores, hematuria, hesitancy, incomplete emptying, nocturia, penile pain, penile swelling, scrotal swelling, testicular pain and urgency.   Musculoskeletal: Negative for arthralgias, back pain and myalgias.   Skin:  Negative for color change, pallor, rash and wound.   Allergic/Immunologic: Negative for environmental allergies, food allergies and immunocompromised state.   Neurological: Negative for dizziness, seizures, weakness and headaches.   Hematological: Negative for adenopathy. Does not bruise/bleed easily.   Psychiatric/Behavioral: Negative.        Objective:      Physical Exam   Nursing note and vitals reviewed.  Constitutional: He is oriented to person, place, and time. He appears well-developed and well-nourished.   HENT:   Head: Normocephalic.   Nose: Nose normal.   Mouth/Throat: Oropharynx is clear and moist.   Eyes: Conjunctivae and EOM are normal. Pupils are equal, round, and reactive to light.   Neck: Normal range of motion. Neck supple.   Cardiovascular: Normal rate, regular rhythm, normal heart sounds and intact distal pulses.    Pulmonary/Chest: Effort normal and breath sounds normal.   Abdominal: Soft. Bowel sounds are normal.   Genitourinary: Rectum normal, prostate normal and penis normal.   Musculoskeletal: Normal range of motion.   Neurological: He is alert and oriented to person, place, and time. He has normal reflexes.   Skin: Skin is warm and dry.     Psychiatric: He has a normal mood and affect. His behavior is normal. Judgment and thought content normal.       Assessment:       1. Benign non-nodular prostatic hyperplasia with lower urinary tract symptoms    2. Elevated PSA, less than 10 ng/ml    3. Prostate cancer        Plan:           Patient Instructions   Repeat PSA f/t in May and every  6 months unless it changes.  Continue Active Surveillance   Benign non-nodular prostatic hyperplasia with lower urinary tract symptoms  -     PSA, total and free; Future; Expected date: 05/01/2019    Elevated PSA, less than 10 ng/ml  -     PSA, total and free; Future; Expected date: 05/01/2019    Prostate cancer  -     PSA, total and free; Future; Expected date: 05/01/2019

## 2019-05-07 ENCOUNTER — OFFICE VISIT (OUTPATIENT)
Dept: UROLOGY | Facility: CLINIC | Age: 59
End: 2019-05-07
Payer: COMMERCIAL

## 2019-05-07 VITALS — WEIGHT: 205 LBS | HEIGHT: 72 IN | BODY MASS INDEX: 27.77 KG/M2

## 2019-05-07 DIAGNOSIS — C61 PROSTATE CANCER: Primary | ICD-10-CM

## 2019-05-07 DIAGNOSIS — N40.1 BENIGN NON-NODULAR PROSTATIC HYPERPLASIA WITH LOWER URINARY TRACT SYMPTOMS: ICD-10-CM

## 2019-05-07 PROCEDURE — 99999 PR PBB SHADOW E&M-EST. PATIENT-LVL III: ICD-10-PCS | Mod: PBBFAC,,, | Performed by: UROLOGY

## 2019-05-07 PROCEDURE — 99213 OFFICE O/P EST LOW 20 MIN: CPT | Mod: S$GLB,,, | Performed by: UROLOGY

## 2019-05-07 PROCEDURE — 3008F BODY MASS INDEX DOCD: CPT | Mod: CPTII,S$GLB,, | Performed by: UROLOGY

## 2019-05-07 PROCEDURE — 99213 PR OFFICE/OUTPT VISIT, EST, LEVL III, 20-29 MIN: ICD-10-PCS | Mod: S$GLB,,, | Performed by: UROLOGY

## 2019-05-07 PROCEDURE — 3008F PR BODY MASS INDEX (BMI) DOCUMENTED: ICD-10-PCS | Mod: CPTII,S$GLB,, | Performed by: UROLOGY

## 2019-05-07 PROCEDURE — 99999 PR PBB SHADOW E&M-EST. PATIENT-LVL III: CPT | Mod: PBBFAC,,, | Performed by: UROLOGY

## 2019-05-07 NOTE — PROGRESS NOTES
Subjective:       Patient ID: Diallo Dawkins is a 58 y.o. male.    Chief Complaint: Benign Prostatic Hypertrophy  and Prostate Cancer  57 yo WM with history of CAP undergoing active surveillance. PSA down to 0.82 ng/ml  ( x 6 months -twice) from 3.2. Undergoing Active Surveillance. Discussed moving f/u to q 6 months and adjusting as needed. On active surveillance. Here for f/u.    Benign Prostatic Hypertrophy   This is a chronic problem. The current episode started more than 1 year ago. The problem has been waxing and waning since onset. Irritative symptoms include frequency (x4) and nocturia (x1-2). Irritative symptoms do not include urgency. Obstructive symptoms do not include dribbling, incomplete emptying, an intermittent stream, a slower stream, straining or a weak stream. Pertinent negatives include no chills, dysuria, genital pain, hematuria, hesitancy, nausea or vomiting. AUA score is 0-7. He is not sexually active. Nothing aggravates the symptoms. Past treatments include nothing.     Review of Systems   Constitutional: Negative for activity change, appetite change, chills, diaphoresis, fatigue, fever and unexpected weight change.   HENT: Negative for congestion, hearing loss, sinus pressure and trouble swallowing.    Eyes: Negative for photophobia, pain, discharge and visual disturbance.   Respiratory: Negative for apnea, cough and shortness of breath.    Cardiovascular: Negative for chest pain, palpitations and leg swelling.   Gastrointestinal: Negative for abdominal distention, abdominal pain, anal bleeding, blood in stool, constipation, diarrhea, nausea, rectal pain and vomiting.   Endocrine: Negative for cold intolerance, heat intolerance, polydipsia, polyphagia and polyuria.   Genitourinary: Positive for frequency (x4) and nocturia (x1-2). Negative for decreased urine volume, difficulty urinating, discharge, dysuria, enuresis, flank pain, genital sores, hematuria, hesitancy, incomplete emptying,  penile pain, penile swelling, scrotal swelling, testicular pain and urgency.   Musculoskeletal: Negative for arthralgias, back pain and myalgias.   Skin: Negative for color change, pallor, rash and wound.   Allergic/Immunologic: Negative for environmental allergies, food allergies and immunocompromised state.   Neurological: Negative for dizziness, seizures, weakness and headaches.   Hematological: Negative for adenopathy. Does not bruise/bleed easily.   Psychiatric/Behavioral: Negative.        Objective:      Physical Exam   Nursing note and vitals reviewed.  Constitutional: He is oriented to person, place, and time. He appears well-developed and well-nourished.   HENT:   Head: Normocephalic.   Nose: Nose normal.   Mouth/Throat: Oropharynx is clear and moist.   Eyes: Conjunctivae and EOM are normal. Pupils are equal, round, and reactive to light.   Neck: Normal range of motion. Neck supple.   Cardiovascular: Normal rate, regular rhythm, normal heart sounds and intact distal pulses.    Pulmonary/Chest: Effort normal and breath sounds normal.   Abdominal: Soft. Bowel sounds are normal.   Genitourinary: Penis normal.   Musculoskeletal: Normal range of motion.   Neurological: He is alert and oriented to person, place, and time. He has normal reflexes.   Skin: Skin is warm and dry.     Psychiatric: He has a normal mood and affect. His behavior is normal. Judgment and thought content normal.       Assessment:       1. Prostate cancer    2. Benign non-nodular prostatic hyperplasia with lower urinary tract symptoms        Plan:       Patient Instructions   Repeat PSA in 3 months  F/U in 3 months

## 2019-05-10 DIAGNOSIS — D51.0 PERNICIOUS ANEMIA: Primary | ICD-10-CM

## 2019-05-10 DIAGNOSIS — I10 ESSENTIAL HYPERTENSION: ICD-10-CM

## 2019-05-10 DIAGNOSIS — Z13.29 THYROID DISORDER SCREEN: ICD-10-CM

## 2019-05-10 DIAGNOSIS — Z13.1 DIABETES MELLITUS SCREENING: ICD-10-CM

## 2019-05-10 DIAGNOSIS — Z13.220 SCREENING CHOLESTEROL LEVEL: ICD-10-CM

## 2019-05-10 RX ORDER — VALSARTAN 80 MG/1
80 TABLET ORAL DAILY
Qty: 30 TABLET | Refills: 0 | Status: SHIPPED | OUTPATIENT
Start: 2019-05-10 | End: 2019-05-30 | Stop reason: SDUPTHER

## 2019-05-10 NOTE — TELEPHONE ENCOUNTER
Call patient - it has been over 1 year since last visit.  Filled blood pressure med for 1 month only.  Advise patient he is overdue for fasting labs and WELLNESS exam to check blood pressure and recheck blood sugars that were high in past - please schedule appointments. Labs ordered.

## 2019-05-30 ENCOUNTER — OFFICE VISIT (OUTPATIENT)
Dept: FAMILY MEDICINE | Facility: CLINIC | Age: 59
End: 2019-05-30
Payer: COMMERCIAL

## 2019-05-30 VITALS
DIASTOLIC BLOOD PRESSURE: 84 MMHG | BODY MASS INDEX: 28.58 KG/M2 | HEIGHT: 72 IN | WEIGHT: 211 LBS | RESPIRATION RATE: 20 BRPM | SYSTOLIC BLOOD PRESSURE: 126 MMHG | TEMPERATURE: 98 F | OXYGEN SATURATION: 98 % | HEART RATE: 70 BPM

## 2019-05-30 DIAGNOSIS — R73.01 IFG (IMPAIRED FASTING GLUCOSE): ICD-10-CM

## 2019-05-30 DIAGNOSIS — Z23 NEED FOR STREPTOCOCCUS PNEUMONIAE VACCINATION: ICD-10-CM

## 2019-05-30 DIAGNOSIS — K21.00 GERD WITH ESOPHAGITIS: ICD-10-CM

## 2019-05-30 DIAGNOSIS — D64.9 ANEMIA, UNSPECIFIED TYPE: ICD-10-CM

## 2019-05-30 DIAGNOSIS — B35.3 TINEA PEDIS OF LEFT FOOT: ICD-10-CM

## 2019-05-30 DIAGNOSIS — I10 ESSENTIAL HYPERTENSION: ICD-10-CM

## 2019-05-30 DIAGNOSIS — N40.1 BENIGN NON-NODULAR PROSTATIC HYPERPLASIA WITH LOWER URINARY TRACT SYMPTOMS: ICD-10-CM

## 2019-05-30 DIAGNOSIS — E78.2 MIXED HYPERLIPIDEMIA: ICD-10-CM

## 2019-05-30 DIAGNOSIS — R53.83 FATIGUE, UNSPECIFIED TYPE: ICD-10-CM

## 2019-05-30 DIAGNOSIS — C61 PROSTATE CANCER: ICD-10-CM

## 2019-05-30 DIAGNOSIS — Z00.00 ANNUAL PHYSICAL EXAM: Primary | ICD-10-CM

## 2019-05-30 PROCEDURE — 99396 PR PREVENTIVE VISIT,EST,40-64: ICD-10-PCS | Mod: 25,S$GLB,, | Performed by: NURSE PRACTITIONER

## 2019-05-30 PROCEDURE — 3079F PR MOST RECENT DIASTOLIC BLOOD PRESSURE 80-89 MM HG: ICD-10-PCS | Mod: CPTII,S$GLB,, | Performed by: NURSE PRACTITIONER

## 2019-05-30 PROCEDURE — 90471 IMMUNIZATION ADMIN: CPT | Mod: S$GLB,,, | Performed by: NURSE PRACTITIONER

## 2019-05-30 PROCEDURE — 90670 PNEUMOCOCCAL CONJUGATE VACCINE 13-VALENT LESS THAN 5YO & GREATER THAN: ICD-10-PCS | Mod: S$GLB,,, | Performed by: NURSE PRACTITIONER

## 2019-05-30 PROCEDURE — 3074F PR MOST RECENT SYSTOLIC BLOOD PRESSURE < 130 MM HG: ICD-10-PCS | Mod: CPTII,S$GLB,, | Performed by: NURSE PRACTITIONER

## 2019-05-30 PROCEDURE — 99999 PR PBB SHADOW E&M-EST. PATIENT-LVL IV: CPT | Mod: PBBFAC,,, | Performed by: NURSE PRACTITIONER

## 2019-05-30 PROCEDURE — 90670 PCV13 VACCINE IM: CPT | Mod: S$GLB,,, | Performed by: NURSE PRACTITIONER

## 2019-05-30 PROCEDURE — 3074F SYST BP LT 130 MM HG: CPT | Mod: CPTII,S$GLB,, | Performed by: NURSE PRACTITIONER

## 2019-05-30 PROCEDURE — 3079F DIAST BP 80-89 MM HG: CPT | Mod: CPTII,S$GLB,, | Performed by: NURSE PRACTITIONER

## 2019-05-30 PROCEDURE — 90471 PNEUMOCOCCAL CONJUGATE VACCINE 13-VALENT LESS THAN 5YO & GREATER THAN: ICD-10-PCS | Mod: S$GLB,,, | Performed by: NURSE PRACTITIONER

## 2019-05-30 PROCEDURE — 99396 PREV VISIT EST AGE 40-64: CPT | Mod: 25,S$GLB,, | Performed by: NURSE PRACTITIONER

## 2019-05-30 PROCEDURE — 99999 PR PBB SHADOW E&M-EST. PATIENT-LVL IV: ICD-10-PCS | Mod: PBBFAC,,, | Performed by: NURSE PRACTITIONER

## 2019-05-30 RX ORDER — LANOLIN ALCOHOL/MO/W.PET/CERES
1000 CREAM (GRAM) TOPICAL DAILY
Qty: 90 TABLET | Refills: 3 | Status: SHIPPED | OUTPATIENT
Start: 2019-05-30 | End: 2020-06-02 | Stop reason: SDUPTHER

## 2019-05-30 RX ORDER — PRENATAL VIT 91/IRON/FOLIC/DHA 28-975-200
COMBINATION PACKAGE (EA) ORAL 2 TIMES DAILY
Qty: 28.4 G | Refills: 3 | Status: SHIPPED | OUTPATIENT
Start: 2019-05-30 | End: 2020-01-08

## 2019-05-30 RX ORDER — ROSUVASTATIN CALCIUM 5 MG/1
5 TABLET, COATED ORAL DAILY
Qty: 90 TABLET | Refills: 1 | Status: SHIPPED | OUTPATIENT
Start: 2019-05-30 | End: 2019-10-09 | Stop reason: SDUPTHER

## 2019-05-30 RX ORDER — VALSARTAN 80 MG/1
80 TABLET ORAL DAILY
Qty: 90 TABLET | Refills: 1 | Status: SHIPPED | OUTPATIENT
Start: 2019-05-30 | End: 2019-10-09 | Stop reason: SDUPTHER

## 2019-05-30 RX ORDER — VIT C/E/ZN/COPPR/LUTEIN/ZEAXAN 250MG-90MG
1000 CAPSULE ORAL DAILY
Qty: 90 CAPSULE | Refills: 3 | Status: SHIPPED | OUTPATIENT
Start: 2019-05-30 | End: 2020-06-02 | Stop reason: SDUPTHER

## 2019-05-30 NOTE — PROGRESS NOTES
Subjective:       Patient ID: Diallo Dawkins is a 58 y.o. male.    Chief Complaint: Annual Exam (lab results)    Patient is a 58 year old white male with Hypertension, IFG, Prostate Cancer with BPH followed by Dr. Arana, mild anemia,  and chronic GERD followed by [facility] that is here today for annual physical exam with fasting lab results..     Patient has Hypertension and takes Valsartan 80 mg daily. Blood pressure is controlled.  /84   Pulse 70   Temp 98.1 °F (36.7 °C) (Oral)   Resp 20   Ht 6' (1.829 m)   Wt 95.7 kg (211 lb)   SpO2 98%   BMI 28.62 kg/m²     Patient has Prostate Cancer that was diagnosed around May 2018 when had prostate biopsy done but has not required any treatment.  Patient has BPH  that is being followed by Dr. Arana.       Patient had a mild anemia on wellness labs in April 2018 and had complained of fatigue.  Before knowing patient had a history of pernicious anemia, I had labs done for workup.  Family later contacted me to report that patient had history of pernicious anemia previously diagnosed.  The CBC shows stable labs since 2016.  His iron and Ferritin levels were normal.  His Vitamin D level was normal but on low end of normal at 35.  His Vitamin B12 level was also normal at 386 but on the lower end of normal - recommended daily supplementation at April 2018 visit - patient reports he has since run out of and quit taking the vitamins.  His anemia was almost resolved with this month labs.  Still advise on daily vitamin D and B12 supplements.     Patient has an  with HgbA1C of 5.1% at May 2018 visit - had advised on lifestyle modifications.  TODAY, IFG is now 126 with HgbA1C of 5.2% - prediabetic range - must cut back on sugars and carbs in diet.    Patient's cholesterol levels are not extremely high  - total 185 with  but HDL low at 37 and triglycerides 165.  Patient is a 58 year old with known HTN - 10-year cardiovascular risk is 11.3% - recommend  statin for cardiovascular protection especially since he is also prediabetic range - patient is agreeable to medication - will start low dose statin therapy and recheck in 6 months.    Patient does complain of a fungal type rash to sole of foot and inner ankle area.    Wellness Labs:  - The Anemia is mostly resolved  --  CMP okay other than   -  Cholesterol discussed above  -  TSH WNL  -  Prostate is monitored by Dr. Arana.    Health Maintenance:   -  Prevnar 13 vaccination due because of the prostate cancer history - will be given today.    Component      Latest Ref Rng & Units 5/23/2019 5/10/2018 4/9/2018   WBC      3.90 - 12.70 K/uL 4.69 3.97 3.81 (L)   RBC      4.60 - 6.20 M/uL 4.59 (L) 4.40 (L) 4.12 (L)   Hemoglobin      14.0 - 18.0 g/dL 14.6 14.0 13.2 (L)   Hematocrit      40.0 - 54.0 % 40.8 38.9 (L) 36.9 (L)   MCV      82 - 98 fL 89 88 90   MCH      27.0 - 31.0 pg 31.8 (H) 31.8 (H) 32.0 (H)   MCHC      32.0 - 36.0 g/dL 35.8 36.0 35.8   RDW      11.5 - 14.5 % 14.2 13.6 14.4   Platelets      150 - 350 K/uL 195 182 199   MPV      9.2 - 12.9 fL 10.0 10.6 9.6   Gran # (ANC)      1.8 - 7.7 K/uL 2.6 2.2 1.9   Lymph #      1.0 - 4.8 K/uL 1.5 1.3 1.3   Mono #      0.3 - 1.0 K/uL 0.5 0.4 0.4   Eos #      0.0 - 0.5 K/uL 0.1 0.1 0.1   Baso #      0.00 - 0.20 K/uL 0.03 0.01 0.02   Gran%      38.0 - 73.0 % 54.4 54.6 50.6   Lymph%      18.0 - 48.0 % 31.1 33.5 35.2   Mono%      4.0 - 15.0 % 10.9 9.6 11.3   Eosinophil%      0.0 - 8.0 % 3.0 2.0 2.4   Basophil%      0.0 - 1.9 % 0.6 0.3 0.5   Differential Method       Automated Automated Automated   Sodium      136 - 145 mmol/L 144 141 143   Potassium      3.5 - 5.1 mmol/L 4.7 4.1 4.3   Chloride      95 - 110 mmol/L 106 105 105   CO2      23 - 29 mmol/L 26 25 28   Glucose      70 - 110 mg/dL 126 (H) 116 (H) 121 (H)   BUN, Bld      2 - 20 mg/dL 21 (H) 20 19   Creatinine      0.50 - 1.40 mg/dL 0.94 0.84 0.92   Calcium      8.7 - 10.5 mg/dL 9.5 9.1 9.1   PROTEIN TOTAL       6.0 - 8.4 g/dL 7.7 7.2 7.3   Albumin      3.5 - 5.2 g/dL 4.5 4.2 4.3   BILIRUBIN TOTAL      0.1 - 1.0 mg/dL 0.5 0.5 0.7   Alkaline Phosphatase      38 - 126 U/L 51 51 50   AST      15 - 46 U/L 34 26 30   ALT      10 - 44 U/L 35 25 35   Anion Gap      8 - 16 mmol/L 12 11 10   eGFR if African American      >60 mL/min/1.73 m:2 >60.0 >60.0 >60.0   eGFR if non African American      >60 mL/min/1.73 m:2 >60.0 >60.0 >60.0   Cholesterol      120 - 199 mg/dL 185  162   Triglycerides      30 - 150 mg/dL 165 (H)  88   HDL      40 - 75 mg/dL 37 (L)  37 (L)   LDL Cholesterol External      63.0 - 159.0 mg/dL 115.0  107.4   Hdl/Cholesterol Ratio      20.0 - 50.0 % 20.0  22.8   Total Cholesterol/HDL Ratio      2.0 - 5.0 5.0  4.4   Non-HDL Cholesterol      mg/dL 148  125   Hemoglobin A1C External      4.0 - 5.6 % 5.2 5.1    Estimated Avg Glucose      68 - 131 mg/dL 103 100    TSH      0.400 - 4.000 uIU/mL 2.400  1.890   Vit D, 25-Hydroxy      30 - 96 ng/mL  35        Current Outpatient Medications   Medication Sig Dispense Refill    omeprazole (PRILOSEC) 20 MG capsule       valsartan (DIOVAN) 80 MG tablet Take 1 tablet (80 mg total) by mouth once daily. 30 tablet 0    cholecalciferol, vitamin D3, 1,000 unit capsule Take 1 capsule (1,000 Units total) by mouth once daily. 90 capsule 3    cyanocobalamin (VITAMIN B-12) 1000 MCG tablet Take 1 tablet (1,000 mcg total) by mouth once daily. 90 tablet 3     No current facility-administered medications for this visit.        Past Medical History:   Diagnosis Date    Benign non-nodular prostatic hyperplasia with lower urinary tract symptoms 07/19/2016    Followed by Dr. Arana    Elevated PSA, less than 10 ng/ml 5/9/2018    Essential hypertension 3/9/2018    GERD with esophagitis     EGD done 12/11/2018 with Dr. Mynor Albina    Pernicious anemia     per patient report    Prostate cancer 8/31/2018    prostate biopsy 5/14/2018 - no treatment required - being followed by [facility] Dr. Arana.    Urinary tract infection        Past Surgical History:   Procedure Laterality Date    COLONOSCOPY  05/24/2018    [facility] Dr. Mynor Montemayor--Polyps (6 mm) in ascending colon and hepatic flexure, Angioectasia in ascending colon, Internal Hemorrhoids.   Colonoscopy in 5 years     CYSTOSCOPY WITH RETROGRADE PYELOGRAM Bilateral 5/14/2018    [facility]    OTHER SURGICAL HISTORY      angiogram on wrist    PROSTATE BIOPSY  05/14/2018    done by Dr. Arana.    TRANSRECTAL ULTRASOUND GUIDED PROSTATE BIOPSY N/A 5/14/2018    Performed by Maxx [facility] at [facility] OR    UPPER GASTROINTESTINAL ENDOSCOPY  12/11/2018    Dr. Montemayor - [facility] - + 7mm polyp in the cardia biopsied - hyperplastic polyp.  GERD with mild chronic esophagitis present.       Family History   Problem Relation Age of Onset    Cancer Paternal Grandmother     No Known Problems Mother     Heart disease Father         passed age75    Hypertension Father     Hyperlipidemia Father     No Known Problems Sister     No Known Problems Sister     No Known Problems Sister     Kidney disease Neg Hx     Prostate cancer Neg Hx        Social History     Socioeconomic History    Marital status: Single     Spouse name: Not on file    Number of children: Not on file    Years of education: Not on file    Highest education level: Not on file   Occupational History    Occupation: sales   Social Needs    Financial resource strain: Not on file    Food insecurity:     Worry: Not on file     Inability: Not on file    Transportation needs:     Medical: Not on file     Non-medical: Not on file   Tobacco Use    Smoking status: Never Smoker    Smokeless tobacco: Never Used   Substance and Sexual Activity    Alcohol use: Yes     Comment: twice a month - 2 drinks per occasion    Drug use: No    Sexual activity: Yes     Partners: Female   Lifestyle    Physical activity:     Days per week: Not  on file     Minutes per session: Not on file    Stress: Not on file   Relationships    Social connections:     Talks on phone: Not on file     Gets together: Not on file     Attends Anabaptism service: Not on file     Active member of club or organization: Not on file     Attends meetings of clubs or organizations: Not on file     Relationship status: Not on file   Other Topics Concern    Not on file   Social History Narrative    Not on file       Review of Systems   Constitutional: Negative for activity change, appetite change, fatigue, fever and unexpected weight change.   HENT: Negative for congestion, ear pain, mouth sores, nosebleeds, postnasal drip, rhinorrhea, sinus pressure, sneezing, sore throat, trouble swallowing and voice change.    Eyes: Negative.    Respiratory: Negative for cough, chest tightness and shortness of breath.    Cardiovascular: Negative for chest pain, palpitations and leg swelling.   Gastrointestinal: Negative.  Negative for abdominal pain, blood in stool, constipation, diarrhea, nausea and vomiting.   Endocrine: Negative.    Genitourinary: Negative for difficulty urinating, dysuria, flank pain, hematuria and urgency.   Musculoskeletal: Negative for arthralgias, back pain, gait problem, joint swelling, myalgias and neck pain.   Skin: Positive for rash. Negative for color change and wound.        Left foot - fungal infection   Allergic/Immunologic: Negative for immunocompromised state.   Neurological: Negative for dizziness, tremors, seizures, syncope, speech difficulty and headaches.   Hematological: Negative for adenopathy. Does not bruise/bleed easily.   Psychiatric/Behavioral: Negative for behavioral problems, dysphoric mood, sleep disturbance and suicidal ideas. The patient is not nervous/anxious.          Objective:     Vitals:    05/30/19 1339 05/30/19 1416   BP: 130/84 126/84   Pulse: 70    Resp: 20    Temp: 98.1 °F (36.7 °C)    TempSrc: Oral    SpO2: 98%    Weight: 95.7 kg (211  lb)    Height: 6' (1.829 m)           Physical Exam   Constitutional: He is oriented to person, place, and time. He appears well-developed and well-nourished. No distress.   + overweight with Body mass index is 28.62 kg/m².       HENT:   Head: Normocephalic.   Right Ear: External ear normal.   Left Ear: External ear normal.   Nose: Nose normal.   Mouth/Throat: Oropharynx is clear and moist. No oropharyngeal exudate.   Eyes: Pupils are equal, round, and reactive to light. EOM are normal. Right eye exhibits no discharge. Left eye exhibits no discharge. No scleral icterus.   Neck: Normal range of motion. Neck supple. No JVD present. No tracheal deviation present. No thyromegaly present.   Cardiovascular: Normal rate, regular rhythm and normal heart sounds.   No murmur heard.  Pulmonary/Chest: Effort normal and breath sounds normal. No stridor. No respiratory distress.   Abdominal: Soft. He exhibits no distension and no mass. There is no tenderness. There is no guarding.   Genitourinary:   Genitourinary Comments: Deferred to Urology   Musculoskeletal: Normal range of motion. He exhibits no edema.        Feet:    Dry cracking skin to sole of foot and erythematous patch with dry scaling along edges -see pictures.   Lymphadenopathy:     He has no cervical adenopathy.   Neurological: He is alert and oriented to person, place, and time. Coordination normal.   Skin: Skin is warm and dry. No rash noted. He is not diaphoretic.   Psychiatric: He has a normal mood and affect. His behavior is normal.             Assessment:         ICD-10-CM ICD-9-CM   1. Annual physical exam Z00.00 V70.0   2. Essential hypertension I10 401.9   3. IFG (impaired fasting glucose) R73.01 790.21   4. Mixed hyperlipidemia E78.2 272.2   5. Anemia, unspecified type D64.9 285.9   6. Prostate cancer C61 185   7. Benign non-nodular prostatic hyperplasia with lower urinary tract symptoms N40.1 600.91   8. GERD with esophagitis K21.0 530.11   9. Fatigue,  unspecified type R53.83 780.79   10. Need for Streptococcus pneumoniae vaccination Z23 V03.82   11. Tinea pedis of left foot B35.3 110.4       Plan:       Annual physical exam  Health Maintenance Summary     Pneumococcal Vaccine (Highest Risk) Next Due 7/25/2019     Done 5/30/2019 Imm Admin: Pneumococcal Conjugate - 13 Valent   Influenza Vaccine Next Due 8/1/2019     Declined 4/19/2018     Done 1/23/2017 Imm Admin: Influenza - Quadrivalent - PF   Colonoscopy Next Due 5/24/2023     Done 5/24/2018 [facility]-- Please see media    Done 1/1/2011 Had polyps, done by MD at    Lipid Panel Next Due 5/23/2024     Done 5/23/2019 LIPID PANEL     Done 4/9/2018 LIPID PANEL     Done 3/23/2017 LIPID PANEL    TETANUS VACCINE Next Due 1/23/2027     Done 1/23/2017 Imm Admin: Tdap   Hepatitis C Screening This plan is no longer active.     Done 3/23/2017 HEPATITIS C ANTIBODY   Shingles Vaccine This plan is no longer active.     Done 2/2/2019 Imm Admin: Zoster Recombinant    Done 10/4/2018 Imm Admin: Zoster Recombinant         Essential hypertension  -  Controlled on present medication - recheck in 6 months.  -     valsartan (DIOVAN) 80 MG tablet; Take 1 tablet (80 mg total) by mouth once daily.  Dispense: 90 tablet; Refill: 1    IFG (impaired fasting glucose)  -  Strict lifestyle modifications - cut out sugars - weight loss - recheck in 6 months.  -     Comprehensive metabolic panel; Future; Expected date: 05/30/2019  -     Hemoglobin A1c; Future; Expected date: 05/30/2019    Mixed hyperlipidemia  -  See HPI for further discussion.  Start Rosuvastatin 5 mg daily and recheck in 6 months.  -     rosuvastatin (CRESTOR) 5 MG tablet; Take 1 tablet (5 mg total) by mouth once daily.  Dispense: 90 tablet; Refill: 1  -     Lipid panel; Future; Expected date: 05/30/2019    Anemia, unspecified type  -  Almost resolved - get back on vitamins.    Prostate cancer  -  Followed by Dr. Arana.  -     (In Office Administered)  Pneumococcal Conjugate Vaccine (13 Valent) (IM)    Benign non-nodular prostatic hyperplasia with lower urinary tract symptoms  -  Followed by Dr. Arana.    GERD with esophagitis  - followed by GI provider.    Fatigue, unspecified type  -     cyanocobalamin (VITAMIN B-12) 1000 MCG tablet; Take 1 tablet (1,000 mcg total) by mouth once daily.  Dispense: 90 tablet; Refill: 3  -     cholecalciferol, vitamin D3, (VITAMIN D3) 1,000 unit capsule; Take 1 capsule (1,000 Units total) by mouth once daily.  Dispense: 90 capsule; Refill: 3    Need for Streptococcus pneumoniae vaccination  -     (In Office Administered) Pneumococcal Conjugate Vaccine (13 Valent) (IM)    Tinea pedis of left foot  -  Apply cream twice daily  Until resolved can take 4 to 6 weeks.  -     terbinafine HCl (LAMISIL) 1 % cream; Apply topically 2 (two) times daily.  Dispense: 28.4 g; Refill: 3      Follow up in about 6 months (around 11/30/2019) for fasting labs and follow up.     Patient's Medications   New Prescriptions    ROSUVASTATIN (CRESTOR) 5 MG TABLET    Take 1 tablet (5 mg total) by mouth once daily.    TERBINAFINE HCL (LAMISIL) 1 % CREAM    Apply topically 2 (two) times daily.   Previous Medications    OMEPRAZOLE (PRILOSEC) 20 MG CAPSULE       Modified Medications    Modified Medication Previous Medication    CHOLECALCIFEROL, VITAMIN D3, (VITAMIN D3) 1,000 UNIT CAPSULE cholecalciferol, vitamin D3, 1,000 unit capsule       Take 1 capsule (1,000 Units total) by mouth once daily.    Take 1 capsule (1,000 Units total) by mouth once daily.    CYANOCOBALAMIN (VITAMIN B-12) 1000 MCG TABLET cyanocobalamin (VITAMIN B-12) 1000 MCG tablet       Take 1 tablet (1,000 mcg total) by mouth once daily.    Take 1 tablet (1,000 mcg total) by mouth once daily.    VALSARTAN (DIOVAN) 80 MG TABLET valsartan (DIOVAN) 80 MG tablet       Take 1 tablet (80 mg total) by mouth once daily.    Take 1 tablet (80 mg total) by mouth once daily.   Discontinued Medications    No  medications on file

## 2019-07-31 ENCOUNTER — PATIENT MESSAGE (OUTPATIENT)
Dept: UROLOGY | Facility: CLINIC | Age: 59
End: 2019-07-31

## 2019-07-31 DIAGNOSIS — R97.20 ELEVATED PSA: Primary | ICD-10-CM

## 2019-08-02 ENCOUNTER — PATIENT OUTREACH (OUTPATIENT)
Dept: ADMINISTRATIVE | Facility: OTHER | Age: 59
End: 2019-08-02

## 2019-08-06 ENCOUNTER — OFFICE VISIT (OUTPATIENT)
Dept: UROLOGY | Facility: CLINIC | Age: 59
End: 2019-08-06
Payer: COMMERCIAL

## 2019-08-06 VITALS
WEIGHT: 211 LBS | HEART RATE: 73 BPM | BODY MASS INDEX: 28.58 KG/M2 | OXYGEN SATURATION: 99 % | DIASTOLIC BLOOD PRESSURE: 84 MMHG | SYSTOLIC BLOOD PRESSURE: 136 MMHG | HEIGHT: 72 IN

## 2019-08-06 DIAGNOSIS — N40.1 BENIGN NON-NODULAR PROSTATIC HYPERPLASIA WITH LOWER URINARY TRACT SYMPTOMS: ICD-10-CM

## 2019-08-06 DIAGNOSIS — C61 PROSTATE CANCER: Primary | ICD-10-CM

## 2019-08-06 PROCEDURE — 3075F PR MOST RECENT SYSTOLIC BLOOD PRESS GE 130-139MM HG: ICD-10-PCS | Mod: CPTII,S$GLB,, | Performed by: UROLOGY

## 2019-08-06 PROCEDURE — 3075F SYST BP GE 130 - 139MM HG: CPT | Mod: CPTII,S$GLB,, | Performed by: UROLOGY

## 2019-08-06 PROCEDURE — 99214 PR OFFICE/OUTPT VISIT, EST, LEVL IV, 30-39 MIN: ICD-10-PCS | Mod: S$GLB,,, | Performed by: UROLOGY

## 2019-08-06 PROCEDURE — 3079F PR MOST RECENT DIASTOLIC BLOOD PRESSURE 80-89 MM HG: ICD-10-PCS | Mod: CPTII,S$GLB,, | Performed by: UROLOGY

## 2019-08-06 PROCEDURE — 99999 PR PBB SHADOW E&M-EST. PATIENT-LVL III: CPT | Mod: PBBFAC,,, | Performed by: UROLOGY

## 2019-08-06 PROCEDURE — 99999 PR PBB SHADOW E&M-EST. PATIENT-LVL III: ICD-10-PCS | Mod: PBBFAC,,, | Performed by: UROLOGY

## 2019-08-06 PROCEDURE — 3008F PR BODY MASS INDEX (BMI) DOCUMENTED: ICD-10-PCS | Mod: CPTII,S$GLB,, | Performed by: UROLOGY

## 2019-08-06 PROCEDURE — 3079F DIAST BP 80-89 MM HG: CPT | Mod: CPTII,S$GLB,, | Performed by: UROLOGY

## 2019-08-06 PROCEDURE — 99214 OFFICE O/P EST MOD 30 MIN: CPT | Mod: S$GLB,,, | Performed by: UROLOGY

## 2019-08-06 PROCEDURE — 3008F BODY MASS INDEX DOCD: CPT | Mod: CPTII,S$GLB,, | Performed by: UROLOGY

## 2019-08-06 NOTE — PROGRESS NOTES
Subjective:       Patient ID: Diallo Dawkins is a 58 y.o. male.    Chief Complaint: Benign Prostatic Hypertrophy    59 yo WM with history of CAP. With history of PSA dropping and low risk CAP. On active Surveillance with no voiding complaints.    Benign Prostatic Hypertrophy   This is a chronic (CAP) problem. The current episode started more than 1 year ago. The problem has been gradually improving since onset. Irritative symptoms do not include frequency, nocturia or urgency. Obstructive symptoms do not include dribbling, incomplete emptying, an intermittent stream, a slower stream, straining or a weak stream. Pertinent negatives include no chills, dysuria, genital pain, hematuria, hesitancy, nausea or vomiting. AUA score is 0-7. He is sexually active. Nothing aggravates the symptoms. Past treatments include nothing (Active surveillance).     Review of Systems   Constitutional: Negative for activity change, appetite change, chills, diaphoresis, fatigue, fever and unexpected weight change.   HENT: Negative for congestion, hearing loss, sinus pressure and trouble swallowing.    Eyes: Negative for photophobia, pain, discharge and visual disturbance.   Respiratory: Negative for apnea, cough and shortness of breath.    Cardiovascular: Negative for chest pain, palpitations and leg swelling.   Gastrointestinal: Negative for abdominal distention, abdominal pain, anal bleeding, blood in stool, constipation, diarrhea, nausea, rectal pain and vomiting.   Endocrine: Negative for cold intolerance, heat intolerance, polydipsia, polyphagia and polyuria.   Genitourinary: Negative for decreased urine volume, difficulty urinating, discharge, dysuria, enuresis, flank pain, frequency, genital sores, hematuria, hesitancy, incomplete emptying, nocturia, penile pain, penile swelling, scrotal swelling, testicular pain and urgency.   Musculoskeletal: Negative for arthralgias, back pain and myalgias.   Skin: Negative for color change,  pallor, rash and wound.   Allergic/Immunologic: Negative for environmental allergies, food allergies and immunocompromised state.   Neurological: Negative for dizziness, seizures, weakness and headaches.   Hematological: Negative for adenopathy. Does not bruise/bleed easily.   Psychiatric/Behavioral: Negative.        Objective:      Physical Exam   Nursing note and vitals reviewed.  Constitutional: He is oriented to person, place, and time. He appears well-developed and well-nourished.   HENT:   Head: Normocephalic.   Nose: Nose normal.   Mouth/Throat: Oropharynx is clear and moist.   Eyes: Conjunctivae and EOM are normal. Pupils are equal, round, and reactive to light.   Neck: Normal range of motion. Neck supple.   Cardiovascular: Normal rate, regular rhythm, normal heart sounds and intact distal pulses.    Pulmonary/Chest: Effort normal and breath sounds normal.   Abdominal: Soft. Bowel sounds are normal.   Genitourinary: Penis normal.   Musculoskeletal: Normal range of motion.   Neurological: He is alert and oriented to person, place, and time. He has normal reflexes.   Skin: Skin is warm and dry.     Psychiatric: He has a normal mood and affect. His behavior is normal. Judgment and thought content normal.       Assessment:       1. Prostate cancer    2. Benign non-nodular prostatic hyperplasia with lower urinary tract symptoms        Plan:       Patient Instructions   Repeat PSA in 6 months  F/U 6 months

## 2019-10-07 DIAGNOSIS — E78.2 MIXED HYPERLIPIDEMIA: ICD-10-CM

## 2019-10-07 DIAGNOSIS — I10 ESSENTIAL HYPERTENSION: ICD-10-CM

## 2019-10-07 RX ORDER — VALSARTAN 80 MG/1
80 TABLET ORAL DAILY
Qty: 90 TABLET | Refills: 1 | OUTPATIENT
Start: 2019-10-07 | End: 2020-10-06

## 2019-10-07 RX ORDER — ROSUVASTATIN CALCIUM 5 MG/1
5 TABLET, COATED ORAL DAILY
Qty: 90 TABLET | Refills: 1 | OUTPATIENT
Start: 2019-10-07 | End: 2020-10-06

## 2019-10-09 DIAGNOSIS — E78.2 MIXED HYPERLIPIDEMIA: ICD-10-CM

## 2019-10-09 DIAGNOSIS — I10 ESSENTIAL HYPERTENSION: ICD-10-CM

## 2019-10-09 RX ORDER — ROSUVASTATIN CALCIUM 5 MG/1
5 TABLET, COATED ORAL DAILY
Qty: 90 TABLET | Refills: 0 | Status: SHIPPED | OUTPATIENT
Start: 2019-10-09 | End: 2019-12-20

## 2019-10-09 RX ORDER — VALSARTAN 80 MG/1
80 TABLET ORAL DAILY
Qty: 90 TABLET | Refills: 0 | Status: SHIPPED | OUTPATIENT
Start: 2019-10-09 | End: 2019-12-09 | Stop reason: DRUGHIGH

## 2019-11-25 ENCOUNTER — TELEPHONE (OUTPATIENT)
Dept: FAMILY MEDICINE | Facility: CLINIC | Age: 59
End: 2019-11-25

## 2019-11-25 NOTE — TELEPHONE ENCOUNTER
----- Message from Lita Acevedo sent at 11/25/2019  2:33 PM CST -----  Contact: [phone]/self   Patient states he is returning your call. Please advise.

## 2019-12-09 ENCOUNTER — OFFICE VISIT (OUTPATIENT)
Dept: FAMILY MEDICINE | Facility: CLINIC | Age: 59
End: 2019-12-09
Payer: COMMERCIAL

## 2019-12-09 DIAGNOSIS — K21.9 GASTROESOPHAGEAL REFLUX DISEASE, ESOPHAGITIS PRESENCE NOT SPECIFIED: ICD-10-CM

## 2019-12-09 DIAGNOSIS — R73.01 IFG (IMPAIRED FASTING GLUCOSE): ICD-10-CM

## 2019-12-09 DIAGNOSIS — Z23 NEEDS FLU SHOT: ICD-10-CM

## 2019-12-09 DIAGNOSIS — N40.1 BENIGN NON-NODULAR PROSTATIC HYPERPLASIA WITH LOWER URINARY TRACT SYMPTOMS: ICD-10-CM

## 2019-12-09 DIAGNOSIS — E78.2 MIXED HYPERLIPIDEMIA: ICD-10-CM

## 2019-12-09 DIAGNOSIS — C61 PROSTATE CANCER: ICD-10-CM

## 2019-12-09 DIAGNOSIS — I10 ESSENTIAL HYPERTENSION: Primary | ICD-10-CM

## 2019-12-09 PROCEDURE — 99214 OFFICE O/P EST MOD 30 MIN: CPT | Mod: 25,S$GLB,, | Performed by: NURSE PRACTITIONER

## 2019-12-09 PROCEDURE — 3077F PR MOST RECENT SYSTOLIC BLOOD PRESSURE >= 140 MM HG: ICD-10-PCS | Mod: CPTII,S$GLB,, | Performed by: NURSE PRACTITIONER

## 2019-12-09 PROCEDURE — 3078F PR MOST RECENT DIASTOLIC BLOOD PRESSURE < 80 MM HG: ICD-10-PCS | Mod: CPTII,S$GLB,, | Performed by: NURSE PRACTITIONER

## 2019-12-09 PROCEDURE — 99214 PR OFFICE/OUTPT VISIT, EST, LEVL IV, 30-39 MIN: ICD-10-PCS | Mod: 25,S$GLB,, | Performed by: NURSE PRACTITIONER

## 2019-12-09 PROCEDURE — 90686 FLU VACCINE (QUAD) GREATER THAN OR EQUAL TO 3YO PRESERVATIVE FREE IM: ICD-10-PCS | Mod: S$GLB,,, | Performed by: NURSE PRACTITIONER

## 2019-12-09 PROCEDURE — 90471 IMMUNIZATION ADMIN: CPT | Mod: S$GLB,,, | Performed by: NURSE PRACTITIONER

## 2019-12-09 PROCEDURE — 99999 PR PBB SHADOW E&M-EST. PATIENT-LVL IV: ICD-10-PCS | Mod: PBBFAC,,, | Performed by: NURSE PRACTITIONER

## 2019-12-09 PROCEDURE — 3008F PR BODY MASS INDEX (BMI) DOCUMENTED: ICD-10-PCS | Mod: CPTII,S$GLB,, | Performed by: NURSE PRACTITIONER

## 2019-12-09 PROCEDURE — 3078F DIAST BP <80 MM HG: CPT | Mod: CPTII,S$GLB,, | Performed by: NURSE PRACTITIONER

## 2019-12-09 PROCEDURE — 3077F SYST BP >= 140 MM HG: CPT | Mod: CPTII,S$GLB,, | Performed by: NURSE PRACTITIONER

## 2019-12-09 PROCEDURE — 99999 PR PBB SHADOW E&M-EST. PATIENT-LVL IV: CPT | Mod: PBBFAC,,, | Performed by: NURSE PRACTITIONER

## 2019-12-09 PROCEDURE — 90471 FLU VACCINE (QUAD) GREATER THAN OR EQUAL TO 3YO PRESERVATIVE FREE IM: ICD-10-PCS | Mod: S$GLB,,, | Performed by: NURSE PRACTITIONER

## 2019-12-09 PROCEDURE — 90686 IIV4 VACC NO PRSV 0.5 ML IM: CPT | Mod: S$GLB,,, | Performed by: NURSE PRACTITIONER

## 2019-12-09 PROCEDURE — 3008F BODY MASS INDEX DOCD: CPT | Mod: CPTII,S$GLB,, | Performed by: NURSE PRACTITIONER

## 2019-12-09 RX ORDER — VALSARTAN 160 MG/1
160 TABLET ORAL DAILY
Qty: 30 TABLET | Refills: 0 | Status: SHIPPED | OUTPATIENT
Start: 2019-12-09 | End: 2019-12-19

## 2019-12-10 VITALS
DIASTOLIC BLOOD PRESSURE: 92 MMHG | BODY MASS INDEX: 28.48 KG/M2 | OXYGEN SATURATION: 99 % | WEIGHT: 210.31 LBS | HEART RATE: 73 BPM | HEIGHT: 72 IN | TEMPERATURE: 98 F | SYSTOLIC BLOOD PRESSURE: 152 MMHG

## 2019-12-10 NOTE — PROGRESS NOTES
Subjective:       Patient ID: Diallo Dawkins is a 59 y.o. male.    Chief Complaint: Follow-up (6 month follow up )    60 y/o male with hypertension, hyperlipidemia, IFG, GERD, prostate cancer, and BPH presents to clinic for follow up and lab results. Patient request influenza vaccine.    Patient has hypertension. He is currently prescribed valsartan 80 mg daily. Blood pressure is not controlled. Initial blood pressure at beginning of visit was: (!) 152/92, pulse 73, temperature 98.3 °F (36.8 °C), temperature source Oral, height 6' (1.829 m), weight 95.4 kg (210 lb 5.1 oz), SpO2 99 %. Second BP reading at end of visit: 148/86. Patient reports elevated blood pressure readings at home >140/90. Will increase valsartan to 160 mg daily.    Patient has hyperlipidemia. Cholesterol levels controlled with daily rosuvastatin 5 mg daily.    Patient has IFG. Glucose level 109, HgbA1c 5.2%. Reinforced better food choices: decrease intake of white bread, white rice, corn, pasta, potatoes and sugar to prevent diabetes.     Patient has prostate cancer-diagnosed 2018 and BPH followed by urology, Dr. Arana.       A1C:  Recent Labs   Lab 05/10/18  0759 05/23/19  0731 11/20/19  0733   Hemoglobin A1C 5.1 5.2 5.2     CBC:  Recent Labs   Lab 04/09/18  0751 05/10/18  0800 05/10/18  0801 05/23/19  0731   WBC 3.81 L 3.97 3.97 4.69   RBC 4.12 L 4.40 L 4.36 L 4.59 L   Hemoglobin 13.2 L 14.0 14.0 14.6   Hematocrit 36.9 L 38.9 L 38.6 L 40.8   Platelets 199 182 179 195   Mean Corpuscular Volume 90 88 89 89   Mean Corpuscular Hemoglobin 32.0 H 31.8 H 32.1 H 31.8 H   Mean Corpuscular Hemoglobin Conc 35.8 36.0 36.3 H 35.8     CMP:  Recent Labs   Lab 04/09/18  0751 05/10/18  0800  05/23/19  0731 11/20/19  0733   Glucose 121 H 116 H   < > 126 H 109   Calcium 9.1 9.1   < > 9.5 8.8   Albumin 4.3 4.2  --  4.5 4.2   Total Protein 7.3 7.2  --  7.7 7.1   Sodium 143 141   < > 144 143   Potassium 4.3 4.1   < > 4.7 3.8   CO2 28 25   < > 26 27   Chloride  105 105   < > 106 108   BUN, Bld 19 20   < > 21 H 20   Creatinine 0.92 0.84   < > 0.94 0.93   Alkaline Phosphatase 50 51  --  51 53   ALT 35 25  --  35 27   AST 30 26  --  34 29   Total Bilirubin 0.7 0.5  --  0.5 0.6    < > = values in this interval not displayed.     LIPIDS:  Recent Labs   Lab 03/23/17  0749 04/09/18  0751 05/23/19  0731 11/20/19  0733   TSH  --  1.890 2.400  --    HDL 31 L 37 L 37 L 38 L   Cholesterol 152 162 185 124   Triglycerides 140 88 165 H 93   LDL Cholesterol 93.0 107.4 115.0 67.4   Hdl/Cholesterol Ratio 20.4 22.8 20.0 30.6   Non-HDL Cholesterol 121 125 148 86   Total Cholesterol/HDL Ratio 4.9 4.4 5.0 3.3     TSH:  Recent Labs   Lab 04/09/18  0751 05/23/19  0731   TSH 1.890 2.400       Current Outpatient Medications   Medication Sig Dispense Refill    cholecalciferol, vitamin D3, (VITAMIN D3) 1,000 unit capsule Take 1 capsule (1,000 Units total) by mouth once daily. 90 capsule 3    cyanocobalamin (VITAMIN B-12) 1000 MCG tablet Take 1 tablet (1,000 mcg total) by mouth once daily. 90 tablet 3    omeprazole (PRILOSEC) 20 MG capsule       rosuvastatin (CRESTOR) 5 MG tablet Take 1 tablet (5 mg total) by mouth once daily. 90 tablet 0    terbinafine HCl (LAMISIL) 1 % cream Apply topically 2 (two) times daily. 28.4 g 3    valsartan (DIOVAN) 160 MG tablet Take 1 tablet (160 mg total) by mouth once daily. 30 tablet 0     No current facility-administered medications for this visit.        Past Medical History:   Diagnosis Date    Benign non-nodular prostatic hyperplasia with lower urinary tract symptoms 07/19/2016    Followed by Dr. Arana    Elevated PSA, less than 10 ng/ml 5/9/2018    Essential hypertension 3/9/2018    GERD with esophagitis     EGD done 12/11/2018 with Dr. Mynor Montemayor    Pernicious anemia     per patient report    Prostate cancer 8/31/2018    prostate biopsy 5/14/2018 - no treatment required - being followed by [facility] Dr. Arana.    Urinary tract infection         Past Surgical History:   Procedure Laterality Date    COLONOSCOPY  05/24/2018    [facility] Dr. Mynor Montemayor--Polyps (6 mm) in ascending colon and hepatic flexure, Angioectasia in ascending colon, Internal Hemorrhoids.   Colonoscopy in 5 years     OTHER SURGICAL HISTORY      angiogram on wrist    PROSTATE BIOPSY  05/14/2018    done by Dr. Arana.    UPPER GASTROINTESTINAL ENDOSCOPY  12/11/2018    Dr. Montemayor - [facility] - + 7mm polyp in the cardia biopsied - hyperplastic polyp.  GERD with mild chronic esophagitis present.       Family History   Problem Relation Age of Onset    Cancer Paternal Grandmother     No Known Problems Mother     Heart disease Father         passed age75    Hypertension Father     Hyperlipidemia Father     No Known Problems Sister     No Known Problems Sister     No Known Problems Sister     Kidney disease Neg Hx     Prostate cancer Neg Hx        Social History     Socioeconomic History    Marital status: Single     Spouse name: Not on file    Number of children: Not on file    Years of education: Not on file    Highest education level: Not on file   Occupational History    Occupation: sales   Social Needs    Financial resource strain: Not on file    Food insecurity:     Worry: Not on file     Inability: Not on file    Transportation needs:     Medical: Not on file     Non-medical: Not on file   Tobacco Use    Smoking status: Never Smoker    Smokeless tobacco: Never Used   Substance and Sexual Activity    Alcohol use: Yes     Comment: twice a month - 2 drinks per occasion    Drug use: No    Sexual activity: Yes     Partners: Female   Lifestyle    Physical activity:     Days per week: Not on file     Minutes per session: Not on file    Stress: Not on file   Relationships    Social connections:     Talks on phone: Not on file     Gets together: Not on file     Attends [employer] service: Not on file     Active member of club or organization: Not on  file     Attends meetings of clubs or organizations: Not on file     Relationship status: Not on file   Other Topics Concern    Not on file   Social History Narrative    Not on file       Review of Systems   Constitutional: Negative for fatigue, fever and unexpected weight change.   HENT: Negative for congestion, nosebleeds and trouble swallowing.    Eyes: Negative for visual disturbance.   Respiratory: Negative for shortness of breath.    Cardiovascular: Negative for chest pain and palpitations.   Gastrointestinal: Negative for abdominal pain.   Endocrine: Negative for polydipsia, polyphagia and polyuria.   Genitourinary: Negative for dysuria and hematuria.   Musculoskeletal: Negative for back pain.   Allergic/Immunologic: Negative for food allergies.   Neurological: Negative for weakness and headaches.   Hematological: Negative for adenopathy. Does not bruise/bleed easily.   Psychiatric/Behavioral: Negative for dysphoric mood.         Objective:     Vitals:    12/09/19 0425 12/09/19 1615   BP: (!) 148/86 (!) 152/92   BP Location: Right arm    Patient Position: Sitting    Pulse:  73   Temp:  98.3 °F (36.8 °C)   TempSrc:  Oral   SpO2:  99%   Weight:  95.4 kg (210 lb 5.1 oz)   Height:  6' (1.829 m)          Physical Exam   Constitutional: He is oriented to person, place, and time. He appears well-developed and well-nourished. No distress.   HENT:   Head: Normocephalic.   Eyes: Pupils are equal, round, and reactive to light. Conjunctivae are normal.   Neck: Normal range of motion. Neck supple.   Cardiovascular: Normal rate and regular rhythm.   Pulmonary/Chest: Effort normal and breath sounds normal.   Abdominal: Soft. Bowel sounds are normal. There is no tenderness.   Musculoskeletal: Normal range of motion.   Neurological: He is alert and oriented to person, place, and time.   Skin: Skin is warm and dry.   Psychiatric: He has a normal mood and affect. His behavior is normal.         Assessment:         ICD-10-CM  ICD-9-CM   1. Essential hypertension I10 401.9   2. Mixed hyperlipidemia E78.2 272.2   3. IFG (impaired fasting glucose) R73.01 790.21   4. Gastroesophageal reflux disease, esophagitis presence not specified K21.9 530.81   5. Prostate cancer C61 185   6. Benign non-nodular prostatic hyperplasia with lower urinary tract symptoms N40.1 600.91   7. Needs flu shot Z23 V04.81       Plan:       Essential hypertension  -    Increase valsartan from 80 mg to 160 mg daily. Keep log of blood pressure measurements twice daily, bring log to next appointment in 4 weeks.  -     valsartan (DIOVAN) 160 MG tablet; Take 1 tablet (160 mg total) by mouth once daily.  Dispense: 30 tablet; Refill: 0    Mixed hyperlipidemia        -     Chronic, stable, continue current medication therapy    IFG (impaired fasting glucose)          Gastroesophageal reflux disease, esophagitis presence not specified  - Chronic, stable, continue current medication therapy    Prostate cancer  - Followed by urology    Benign non-nodular prostatic hyperplasia with lower urinary tract symptoms  - Followed by urology    Needs flu shot  -     Will receive today in clinic  -     Influenza - Quadrivalent (6 months+) (PF)      Follow up in about 4 weeks (around 1/6/2020) for for blood pressure check, medication management.     Patient's Medications   New Prescriptions    VALSARTAN (DIOVAN) 160 MG TABLET    Take 1 tablet (160 mg total) by mouth once daily.   Previous Medications    CHOLECALCIFEROL, VITAMIN D3, (VITAMIN D3) 1,000 UNIT CAPSULE    Take 1 capsule (1,000 Units total) by mouth once daily.    CYANOCOBALAMIN (VITAMIN B-12) 1000 MCG TABLET    Take 1 tablet (1,000 mcg total) by mouth once daily.    OMEPRAZOLE (PRILOSEC) 20 MG CAPSULE        ROSUVASTATIN (CRESTOR) 5 MG TABLET    Take 1 tablet (5 mg total) by mouth once daily.    TERBINAFINE HCL (LAMISIL) 1 % CREAM    Apply topically 2 (two) times daily.   Modified Medications    No medications on file    Discontinued Medications    VALSARTAN (DIOVAN) 80 MG TABLET    Take 1 tablet (80 mg total) by mouth once daily.

## 2019-12-19 DIAGNOSIS — I10 ESSENTIAL HYPERTENSION: ICD-10-CM

## 2019-12-19 DIAGNOSIS — E78.2 MIXED HYPERLIPIDEMIA: ICD-10-CM

## 2019-12-19 RX ORDER — VALSARTAN 160 MG/1
TABLET ORAL
Qty: 30 TABLET | Refills: 1 | Status: SHIPPED | OUTPATIENT
Start: 2019-12-19 | End: 2020-01-08 | Stop reason: SDUPTHER

## 2019-12-20 RX ORDER — ROSUVASTATIN CALCIUM 5 MG/1
TABLET, COATED ORAL
Qty: 90 TABLET | Refills: 4 | Status: SHIPPED | OUTPATIENT
Start: 2019-12-20 | End: 2021-03-15

## 2020-01-08 ENCOUNTER — OFFICE VISIT (OUTPATIENT)
Dept: FAMILY MEDICINE | Facility: CLINIC | Age: 60
End: 2020-01-08
Payer: COMMERCIAL

## 2020-01-08 VITALS
OXYGEN SATURATION: 96 % | HEART RATE: 89 BPM | DIASTOLIC BLOOD PRESSURE: 80 MMHG | RESPIRATION RATE: 18 BRPM | TEMPERATURE: 98 F | BODY MASS INDEX: 28.31 KG/M2 | HEIGHT: 72 IN | SYSTOLIC BLOOD PRESSURE: 120 MMHG | WEIGHT: 209 LBS

## 2020-01-08 DIAGNOSIS — Z13.0 SCREENING FOR DEFICIENCY ANEMIA: ICD-10-CM

## 2020-01-08 DIAGNOSIS — R05.8 POST-VIRAL COUGH SYNDROME: Primary | ICD-10-CM

## 2020-01-08 DIAGNOSIS — Z13.29 THYROID DISORDER SCREEN: ICD-10-CM

## 2020-01-08 DIAGNOSIS — R73.01 IFG (IMPAIRED FASTING GLUCOSE): ICD-10-CM

## 2020-01-08 DIAGNOSIS — I10 ESSENTIAL HYPERTENSION: ICD-10-CM

## 2020-01-08 DIAGNOSIS — E78.2 MIXED HYPERLIPIDEMIA: ICD-10-CM

## 2020-01-08 DIAGNOSIS — Z13.1 DIABETES MELLITUS SCREENING: ICD-10-CM

## 2020-01-08 DIAGNOSIS — Z00.00 ENCOUNTER FOR ANNUAL PHYSICAL EXAM: ICD-10-CM

## 2020-01-08 PROCEDURE — 3079F PR MOST RECENT DIASTOLIC BLOOD PRESSURE 80-89 MM HG: ICD-10-PCS | Mod: CPTII,S$GLB,, | Performed by: NURSE PRACTITIONER

## 2020-01-08 PROCEDURE — 3079F DIAST BP 80-89 MM HG: CPT | Mod: CPTII,S$GLB,, | Performed by: NURSE PRACTITIONER

## 2020-01-08 PROCEDURE — 99213 OFFICE O/P EST LOW 20 MIN: CPT | Mod: 25,S$GLB,, | Performed by: NURSE PRACTITIONER

## 2020-01-08 PROCEDURE — 96372 THER/PROPH/DIAG INJ SC/IM: CPT | Mod: S$GLB,,, | Performed by: NURSE PRACTITIONER

## 2020-01-08 PROCEDURE — 99999 PR PBB SHADOW E&M-EST. PATIENT-LVL IV: CPT | Mod: PBBFAC,,, | Performed by: NURSE PRACTITIONER

## 2020-01-08 PROCEDURE — 3008F PR BODY MASS INDEX (BMI) DOCUMENTED: ICD-10-PCS | Mod: CPTII,S$GLB,, | Performed by: NURSE PRACTITIONER

## 2020-01-08 PROCEDURE — 3074F PR MOST RECENT SYSTOLIC BLOOD PRESSURE < 130 MM HG: ICD-10-PCS | Mod: CPTII,S$GLB,, | Performed by: NURSE PRACTITIONER

## 2020-01-08 PROCEDURE — 3074F SYST BP LT 130 MM HG: CPT | Mod: CPTII,S$GLB,, | Performed by: NURSE PRACTITIONER

## 2020-01-08 PROCEDURE — 99999 PR PBB SHADOW E&M-EST. PATIENT-LVL IV: ICD-10-PCS | Mod: PBBFAC,,, | Performed by: NURSE PRACTITIONER

## 2020-01-08 PROCEDURE — 99213 PR OFFICE/OUTPT VISIT, EST, LEVL III, 20-29 MIN: ICD-10-PCS | Mod: 25,S$GLB,, | Performed by: NURSE PRACTITIONER

## 2020-01-08 PROCEDURE — 3008F BODY MASS INDEX DOCD: CPT | Mod: CPTII,S$GLB,, | Performed by: NURSE PRACTITIONER

## 2020-01-08 PROCEDURE — 96372 PR INJECTION,THERAP/PROPH/DIAG2ST, IM OR SUBCUT: ICD-10-PCS | Mod: S$GLB,,, | Performed by: NURSE PRACTITIONER

## 2020-01-08 RX ORDER — BETAMETHASONE SODIUM PHOSPHATE AND BETAMETHASONE ACETATE 3; 3 MG/ML; MG/ML
9 INJECTION, SUSPENSION INTRA-ARTICULAR; INTRALESIONAL; INTRAMUSCULAR; SOFT TISSUE
Status: COMPLETED | OUTPATIENT
Start: 2020-01-08 | End: 2020-01-08

## 2020-01-08 RX ORDER — BROMPHENIRAMINE MALEATE, PSEUDOEPHEDRINE HYDROCHLORIDE, AND DEXTROMETHORPHAN HYDROBROMIDE 2; 30; 10 MG/5ML; MG/5ML; MG/5ML
10 SYRUP ORAL EVERY 4 HOURS PRN
Qty: 240 ML | Refills: 0 | Status: SHIPPED | OUTPATIENT
Start: 2020-01-08 | End: 2020-03-10 | Stop reason: ALTCHOICE

## 2020-01-08 RX ORDER — VALSARTAN 160 MG/1
160 TABLET ORAL DAILY
Qty: 90 TABLET | Refills: 1 | Status: SHIPPED | OUTPATIENT
Start: 2020-01-08 | End: 2020-08-19 | Stop reason: SDUPTHER

## 2020-01-08 RX ORDER — FLUTICASONE PROPIONATE 50 MCG
2 SPRAY, SUSPENSION (ML) NASAL DAILY
Qty: 16 G | Refills: 1 | Status: SHIPPED | OUTPATIENT
Start: 2020-01-08 | End: 2020-02-03

## 2020-01-08 RX ADMIN — BETAMETHASONE SODIUM PHOSPHATE AND BETAMETHASONE ACETATE 9 MG: 3; 3 INJECTION, SUSPENSION INTRA-ARTICULAR; INTRALESIONAL; INTRAMUSCULAR; SOFT TISSUE at 02:01

## 2020-01-08 NOTE — PROGRESS NOTES
Subjective:       Patient ID: Diallo Dawkins is a 59 y.o. male.    Chief Complaint: URI and Nasal Congestion     Patient is a 59 year old white male with Hypertension, IFG, Prostate Cancer with BPH followed by Dr. Arana, mild anemia,  and chronic GERD followed by [facility] that is here today for blood pressure check as well as complaint of URI.    Patient has Hypertension and was increased to Valsartan 160 mg daily at last visit in December 2019.  Blood pressure is now controlled on this dose.  /80   Pulse 89   Temp 98.4 °F (36.9 °C) (Oral)   Resp 18   Ht 6' (1.829 m)   Wt 94.8 kg (208 lb 15.9 oz)   SpO2 96%   BMI 28.34 kg/m²     Patient has URI s/s for past 2 to 3 weeks. Seen at [facility] on 12/30/2019 - put on ZPAK and prometh DM for diagnosed bronchitis - states symptoms improved but did not resolve and symptoms seem to be worsening again.  See notes below.    URI    This is a new problem. Episode onset: started around 2 to 3 weeks ago with sore throat and dry cough. The problem has been gradually worsening. There has been no fever. Associated symptoms include congestion, coughing, rhinorrhea, sneezing and a sore throat. Pertinent negatives include no abdominal pain, chest pain, diarrhea, dysuria, ear pain, headaches, nausea, neck pain, rash or vomiting. Associated symptoms comments: Started with scratchy throat and dry cough around 2 to 3 weeks ago.  Went to Urgent Care around 7 to 8 days ago - diagnosed URI - given a Zpak and cough medication.  Reports the URI s/s did get better but did not resolve.  He reports girlfriend with same symptoms past 2 weeks.  Nasal discharge is now mostly clear.  Cough is mostly nonproductive - occasional clear phlegm.. Treatments tried: Given Zpak and cough medication at [facility]. The treatment provided mild relief.       Current Outpatient Medications   Medication Sig Dispense Refill    cholecalciferol, vitamin D3, (VITAMIN D3)  1,000 unit capsule Take 1 capsule (1,000 Units total) by mouth once daily. 90 capsule 3    cyanocobalamin (VITAMIN B-12) 1000 MCG tablet Take 1 tablet (1,000 mcg total) by mouth once daily. 90 tablet 3    omeprazole (PRILOSEC) 20 MG capsule       rosuvastatin (CRESTOR) 5 MG tablet TAKE 1 TABLET DAILY 90 tablet 4    valsartan (DIOVAN) 160 MG tablet TAKE 1 TABLET DAILY 30 tablet 1     No current facility-administered medications for this visit.        Past Medical History:   Diagnosis Date    Benign non-nodular prostatic hyperplasia with lower urinary tract symptoms 07/19/2016    Followed by Dr. Arana    Elevated PSA, less than 10 ng/ml 5/9/2018    Essential hypertension 3/9/2018    GERD with esophagitis     EGD done 12/11/2018 with Dr. Mynor Montemayor    Pernicious anemia     per patient report    Prostate cancer 8/31/2018    prostate biopsy 5/14/2018 - no treatment required - being followed by [facility] Dr. Arana.    Urinary tract infection        Past Surgical History:   Procedure Laterality Date    COLONOSCOPY  05/24/2018    [facility] Dr. Mynor Montemayor--Polyps (6 mm) in ascending colon and hepatic flexure, Angioectasia in ascending colon, Internal Hemorrhoids.   Colonoscopy in 5 years     OTHER SURGICAL HISTORY      angiogram on wrist    PROSTATE BIOPSY  05/14/2018    done by Dr. Arana.    UPPER GASTROINTESTINAL ENDOSCOPY  12/11/2018    Dr. Montemayor - [facility] - + 7mm polyp in the cardia biopsied - hyperplastic polyp.  GERD with mild chronic esophagitis present.       Family History   Problem Relation Age of Onset    Cancer Paternal Grandmother     No Known Problems Mother     Heart disease Father         passed age73    Hypertension Father     Hyperlipidemia Father     No Known Problems Sister     No Known Problems Sister     No Known Problems Sister     Kidney disease Neg Hx     Prostate cancer Neg Hx        Social History     Socioeconomic History    Marital status:  Single     Spouse name: Not on file    Number of children: Not on file    Years of education: Not on file    Highest education level: Not on file   Occupational History    Occupation: sales   Social Needs    Financial resource strain: Not on file    Food insecurity:     Worry: Not on file     Inability: Not on file    Transportation needs:     Medical: Not on file     Non-medical: Not on file   Tobacco Use    Smoking status: Never Smoker    Smokeless tobacco: Never Used   Substance and Sexual Activity    Alcohol use: Yes     Comment: twice a month - 2 drinks per occasion    Drug use: No    Sexual activity: Yes     Partners: Female   Lifestyle    Physical activity:     Days per week: Not on file     Minutes per session: Not on file    Stress: Not on file   Relationships    Social connections:     Talks on phone: Not on file     Gets together: Not on file     Attends [employer] service: Not on file     Active member of club or organization: Not on file     Attends meetings of clubs or organizations: Not on file     Relationship status: Not on file   Other Topics Concern    Not on file   Social History Narrative    Not on file       Review of Systems   Constitutional: Negative for activity change, appetite change, fatigue, fever and unexpected weight change.   HENT: Positive for congestion, postnasal drip, rhinorrhea, sneezing and sore throat. Negative for ear pain, mouth sores, nosebleeds, sinus pressure, trouble swallowing and voice change.    Eyes: Negative.    Respiratory: Positive for cough. Negative for chest tightness and shortness of breath.    Cardiovascular: Negative for chest pain, palpitations and leg swelling.   Gastrointestinal: Negative.  Negative for abdominal pain, blood in stool, constipation, diarrhea, nausea and vomiting.   Endocrine: Negative.    Genitourinary: Negative for difficulty urinating, dysuria, flank pain, hematuria and urgency.   Musculoskeletal: Negative for arthralgias,  back pain, gait problem, joint swelling, myalgias and neck pain.   Skin: Negative for color change, rash and wound.   Allergic/Immunologic: Negative for immunocompromised state.   Neurological: Negative for dizziness, tremors, seizures, syncope, speech difficulty and headaches.   Hematological: Negative for adenopathy. Does not bruise/bleed easily.   Psychiatric/Behavioral: Negative for behavioral problems, dysphoric mood, sleep disturbance and suicidal ideas. The patient is not nervous/anxious.          Objective:     Vitals:    01/08/20 1357 01/08/20 1412   BP: 118/82 120/80   BP Location: Right arm    Patient Position: Sitting    BP Method: Large (Manual)    Pulse: 89    Resp: 18    Temp: 98.4 °F (36.9 °C)    TempSrc: Oral    SpO2: 96%    Weight: 94.8 kg (208 lb 15.9 oz)    Height: 6' (1.829 m)           Physical Exam   Constitutional: He is oriented to person, place, and time. He appears well-developed and well-nourished. No distress.   + overweight with Body mass index is 28.34 kg/m².   HENT:   Head: Normocephalic.   Right Ear: External ear normal.   Left Ear: External ear normal.   Nose: Mucosal edema and rhinorrhea present.   Mouth/Throat: Posterior oropharyngeal erythema present. No oropharyngeal exudate or posterior oropharyngeal edema.   Eyes: Pupils are equal, round, and reactive to light. EOM are normal. Right eye exhibits no discharge. Left eye exhibits no discharge. No scleral icterus.   Neck: Normal range of motion. Neck supple. No JVD present. No tracheal deviation present. No thyromegaly present.   Cardiovascular: Normal rate, regular rhythm and normal heart sounds.   No murmur heard.  Pulmonary/Chest: Effort normal and breath sounds normal. No stridor. No tachypnea. No respiratory distress. He has no decreased breath sounds. He has no wheezes. He has no rhonchi. He has no rales.   Abdominal: Soft. He exhibits no distension and no mass. There is no tenderness. There is no guarding.    Musculoskeletal: Normal range of motion. He exhibits no edema.   Lymphadenopathy:     He has no cervical adenopathy.   Neurological: He is alert and oriented to person, place, and time. Coordination normal.   Skin: Skin is warm and dry. No rash noted. He is not diaphoretic.   Psychiatric: He has a normal mood and affect. His behavior is normal.         Assessment:         ICD-10-CM ICD-9-CM   1. Post-viral cough syndrome R05 786.2   2. Essential hypertension I10 401.9   3. Mixed hyperlipidemia E78.2 272.2   4. IFG (impaired fasting glucose) R73.01 790.21   5. Screening for deficiency anemia Z13.0 V78.1   6. Thyroid disorder screen Z13.29 V77.0   7. Diabetes mellitus screening Z13.1 V77.1   8. Encounter for annual physical exam Z00.00 V70.0       Plan:       Post-viral cough syndrome  -  Celestone given in office.  Take Bromfed DM and Flonase for symptom management.  -     betamethasone acetate-betamethasone sodium phosphate injection 9 mg  -     brompheniramine-pseudoeph-DM (BROMFED DM) 2-30-10 mg/5 mL Syrp; Take 10 mLs by mouth every 4 (four) hours as needed.  Dispense: 240 mL; Refill: 0  -     fluticasone propionate (FLONASE) 50 mcg/actuation nasal spray; 2 sprays (100 mcg total) by Each Nostril route once daily.  Dispense: 16 g; Refill: 1    Essential hypertension  -  Controlled on present medication.  Recheck in 6 months.  -     valsartan (DIOVAN) 160 MG tablet; Take 1 tablet (160 mg total) by mouth once daily.  Dispense: 90 tablet; Refill: 1    Mixed hyperlipidemia  -  continue current medication and recheck in 6 months  -     Lipid panel; Future; Expected date: 01/08/2020    IFG (impaired fasting glucose)  -  decrease sugars and carbohydrates in diet will recheck in June 2020    Screening for deficiency anemia  -     CBC auto differential; Future; Expected date: 01/08/2020    Thyroid disorder screen  -     TSH; Future; Expected date: 01/08/2020    Diabetes mellitus screening  -     Comprehensive metabolic  panel; Future; Expected date: 01/08/2020  -     Hemoglobin A1c; Future; Expected date: 01/08/2020    Encounter for annual physical exam  -     CBC auto differential; Future; Expected date: 01/08/2020  -     Comprehensive metabolic panel; Future; Expected date: 01/08/2020  -     Lipid panel; Future; Expected date: 01/08/2020  -     Hemoglobin A1c; Future; Expected date: 01/08/2020  -     TSH; Future; Expected date: 01/08/2020      Follow up in about 5 months (around 6/8/2020) for fasting labs and WELLNESS EXAM.     Patient's Medications   New Prescriptions    BROMPHENIRAMINE-PSEUDOEPH-DM (BROMFED DM) 2-30-10 MG/5 ML SYRP    Take 10 mLs by mouth every 4 (four) hours as needed.    FLUTICASONE PROPIONATE (FLONASE) 50 MCG/ACTUATION NASAL SPRAY    2 sprays (100 mcg total) by Each Nostril route once daily.   Previous Medications    CHOLECALCIFEROL, VITAMIN D3, (VITAMIN D3) 1,000 UNIT CAPSULE    Take 1 capsule (1,000 Units total) by mouth once daily.    CYANOCOBALAMIN (VITAMIN B-12) 1000 MCG TABLET    Take 1 tablet (1,000 mcg total) by mouth once daily.    OMEPRAZOLE (PRILOSEC) 20 MG CAPSULE        ROSUVASTATIN (CRESTOR) 5 MG TABLET    TAKE 1 TABLET DAILY   Modified Medications    Modified Medication Previous Medication    VALSARTAN (DIOVAN) 160 MG TABLET valsartan (DIOVAN) 160 MG tablet       Take 1 tablet (160 mg total) by mouth once daily.    TAKE 1 TABLET DAILY   Discontinued Medications    TERBINAFINE HCL (LAMISIL) 1 % CREAM    Apply topically 2 (two) times daily.

## 2020-02-02 DIAGNOSIS — R05.8 POST-VIRAL COUGH SYNDROME: ICD-10-CM

## 2020-02-03 RX ORDER — FLUTICASONE PROPIONATE 50 MCG
2 SPRAY, SUSPENSION (ML) NASAL DAILY
Qty: 16 ML | Refills: 1 | Status: SHIPPED | OUTPATIENT
Start: 2020-02-03 | End: 2020-03-10 | Stop reason: ALTCHOICE

## 2020-03-02 ENCOUNTER — PATIENT MESSAGE (OUTPATIENT)
Dept: FAMILY MEDICINE | Facility: CLINIC | Age: 60
End: 2020-03-02

## 2020-03-02 RX ORDER — OMEPRAZOLE 20 MG/1
20 CAPSULE, DELAYED RELEASE ORAL DAILY
Qty: 30 CAPSULE | Refills: 0 | Status: SHIPPED | OUTPATIENT
Start: 2020-03-02 | End: 2020-03-10 | Stop reason: SDUPTHER

## 2020-03-10 ENCOUNTER — TELEPHONE (OUTPATIENT)
Dept: FAMILY MEDICINE | Facility: CLINIC | Age: 60
End: 2020-03-10

## 2020-03-10 ENCOUNTER — OFFICE VISIT (OUTPATIENT)
Dept: FAMILY MEDICINE | Facility: CLINIC | Age: 60
End: 2020-03-10

## 2020-03-10 VITALS
BODY MASS INDEX: 28.15 KG/M2 | TEMPERATURE: 98 F | WEIGHT: 207.81 LBS | HEART RATE: 63 BPM | DIASTOLIC BLOOD PRESSURE: 78 MMHG | HEIGHT: 72 IN | OXYGEN SATURATION: 97 % | SYSTOLIC BLOOD PRESSURE: 136 MMHG | RESPIRATION RATE: 18 BRPM

## 2020-03-10 DIAGNOSIS — H81.10 BENIGN PAROXYSMAL POSITIONAL VERTIGO, UNSPECIFIED LATERALITY: Primary | ICD-10-CM

## 2020-03-10 DIAGNOSIS — H61.21 IMPACTED CERUMEN OF RIGHT EAR: ICD-10-CM

## 2020-03-10 DIAGNOSIS — K21.9 CHRONIC GERD: ICD-10-CM

## 2020-03-10 DIAGNOSIS — K31.7 HYPERPLASTIC POLYP OF STOMACH: ICD-10-CM

## 2020-03-10 PROCEDURE — 99214 PR OFFICE/OUTPT VISIT, EST, LEVL IV, 30-39 MIN: ICD-10-PCS | Mod: S$PBB,,, | Performed by: NURSE PRACTITIONER

## 2020-03-10 PROCEDURE — 99214 OFFICE O/P EST MOD 30 MIN: CPT | Mod: S$PBB,,, | Performed by: NURSE PRACTITIONER

## 2020-03-10 PROCEDURE — 99999 PR PBB SHADOW E&M-EST. PATIENT-LVL IV: CPT | Mod: PBBFAC,,, | Performed by: NURSE PRACTITIONER

## 2020-03-10 PROCEDURE — 99999 PR PBB SHADOW E&M-EST. PATIENT-LVL IV: ICD-10-PCS | Mod: PBBFAC,,, | Performed by: NURSE PRACTITIONER

## 2020-03-10 PROCEDURE — 99214 OFFICE O/P EST MOD 30 MIN: CPT | Mod: PBBFAC,PN | Performed by: NURSE PRACTITIONER

## 2020-03-10 RX ORDER — MECLIZINE HYDROCHLORIDE 25 MG/1
25 TABLET ORAL 3 TIMES DAILY PRN
Qty: 30 TABLET | Refills: 0 | Status: SHIPPED | OUTPATIENT
Start: 2020-03-10 | End: 2020-06-02 | Stop reason: ALTCHOICE

## 2020-03-10 RX ORDER — OMEPRAZOLE 20 MG/1
20 CAPSULE, DELAYED RELEASE ORAL DAILY
Qty: 90 CAPSULE | Refills: 0 | Status: SHIPPED | OUTPATIENT
Start: 2020-03-10 | End: 2020-09-24 | Stop reason: ALTCHOICE

## 2020-03-10 NOTE — PROGRESS NOTES
Subjective:       Patient ID: Diallo Dawkins is a 59 y.o. male.    Chief Complaint: Dizziness and Nausea    Dizziness:   Chronicity:  New  Onset:  Today (started this morning while working on a table - laid down on floor to work on underneath of table and had acute dizziness with nausea.  Reports still present but waxing and waning - worsens with position changes)  Progression since onset:  Waxing and waning (this morning)  Pain Scale:  0/10  Dizziness characteristics:  Sensation of movement, spinning inside head only and blacking out   Associated symptoms: nausea.no ear congestion, no ear pain, no fever, no headaches, no tinnitus, no vomiting, no diaphoresis, no visual disturbances, no syncope, no palpitations, no facial weakness, no slurred speech, no numbness in extremities and no chest pain.   Had headache two days ago but none now.  Aggravated by:  Lying down, position changes and getting up  Treatments tried:  Nothing  Nausea   Associated symptoms include nausea. Pertinent negatives include no abdominal pain, arthralgias, chest pain, congestion, coughing, diaphoresis, fatigue, fever, headaches, joint swelling, myalgias, neck pain, rash, sore throat or vomiting.     Patient's blood pressure was normal on arrival 136/78.  I completed Nori Hallpike testing and had no dizziness with lying down and head position changes at that time BUT when I had patient lay down so that I could do orthostatic vital signs - patient had an acute episode of dizziness so sat right back up and was very anxious.  Had patient again to lay back down after episode passed without dizziness but was very anxious and held his breath - had to advised him on deep breaths through nose and out mouth for relaxation techinique and no longer had dizziness but BP was high at 156/90.  With sitting, /94 and standing 146/94.  No drop in BP with position changes.    Current Outpatient Medications   Medication Sig Dispense Refill    omeprazole  (PRILOSEC) 20 MG capsule Take 1 capsule (20 mg total) by mouth once daily. 30 capsule 0    rosuvastatin (CRESTOR) 5 MG tablet TAKE 1 TABLET DAILY 90 tablet 4    valsartan (DIOVAN) 160 MG tablet Take 1 tablet (160 mg total) by mouth once daily. 90 tablet 1    cholecalciferol, vitamin D3, (VITAMIN D3) 1,000 unit capsule Take 1 capsule (1,000 Units total) by mouth once daily. (Patient not taking: Reported on 3/10/2020) 90 capsule 3    cyanocobalamin (VITAMIN B-12) 1000 MCG tablet Take 1 tablet (1,000 mcg total) by mouth once daily. (Patient not taking: Reported on 3/10/2020) 90 tablet 3     No current facility-administered medications for this visit.        Past Medical History:   Diagnosis Date    Benign non-nodular prostatic hyperplasia with lower urinary tract symptoms 07/19/2016    Followed by Dr. Arana    Elevated PSA, less than 10 ng/ml 5/9/2018    Essential hypertension 3/9/2018    GERD with esophagitis     EGD done 12/11/2018 with Dr. Mynor Montemayor    Pernicious anemia     per patient report    Prostate cancer 8/31/2018    prostate biopsy 5/14/2018 - no treatment required - being followed by [facility] Dr. Arana.    Urinary tract infection        Past Surgical History:   Procedure Laterality Date    COLONOSCOPY  05/24/2018    [facility] Dr. Mynor Montemayor--Polyps (6 mm) in ascending colon and hepatic flexure, Angioectasia in ascending colon, Internal Hemorrhoids.   Colonoscopy in 5 years     OTHER SURGICAL HISTORY      angiogram on wrist    PROSTATE BIOPSY  05/14/2018    done by Dr. Arana.    UPPER GASTROINTESTINAL ENDOSCOPY  12/11/2018    Dr. Montemayor - [facility] - + 7mm polyp in the cardia biopsied - hyperplastic polyp.  GERD with mild chronic esophagitis present.       Family History   Problem Relation Age of Onset    Cancer Paternal Grandmother     No Known Problems Mother     Heart disease Father         passed age73    Hypertension Father     Hyperlipidemia Father      No Known Problems Sister     No Known Problems Sister     No Known Problems Sister     Kidney disease Neg Hx     Prostate cancer Neg Hx        Social History     Socioeconomic History    Marital status: Single     Spouse name: Not on file    Number of children: Not on file    Years of education: Not on file    Highest education level: Not on file   Occupational History    Occupation: sales   Social Needs    Financial resource strain: Not on file    Food insecurity:     Worry: Not on file     Inability: Not on file    Transportation needs:     Medical: Not on file     Non-medical: Not on file   Tobacco Use    Smoking status: Never Smoker    Smokeless tobacco: Never Used   Substance and Sexual Activity    Alcohol use: Yes     Comment: twice a month - 2 drinks per occasion    Drug use: No    Sexual activity: Yes     Partners: Female   Lifestyle    Physical activity:     Days per week: Not on file     Minutes per session: Not on file    Stress: Not on file   Relationships    Social connections:     Talks on phone: Not on file     Gets together: Not on file     Attends Faith service: Not on file     Active member of club or organization: Not on file     Attends meetings of clubs or organizations: Not on file     Relationship status: Not on file   Other Topics Concern    Not on file   Social History Narrative    Not on file       Review of Systems   Constitutional: Negative for activity change, appetite change, diaphoresis, fatigue, fever and unexpected weight change.   HENT: Negative for congestion, ear pain, mouth sores, nosebleeds, postnasal drip, rhinorrhea, sinus pressure, sneezing, sore throat, tinnitus, trouble swallowing and voice change.    Eyes: Negative.    Respiratory: Negative for cough, chest tightness and shortness of breath.    Cardiovascular: Negative for chest pain, palpitations, leg swelling and syncope.   Gastrointestinal: Positive for nausea. Negative for abdominal pain,  blood in stool, constipation, diarrhea and vomiting.   Endocrine: Negative.    Genitourinary: Negative for difficulty urinating, dysuria, flank pain, hematuria and urgency.   Musculoskeletal: Negative for arthralgias, back pain, gait problem, joint swelling, myalgias and neck pain.   Skin: Negative for color change, rash and wound.   Allergic/Immunologic: Negative for immunocompromised state.   Neurological: Positive for dizziness. Negative for tremors, seizures, syncope, speech difficulty and headaches.   Hematological: Negative for adenopathy. Does not bruise/bleed easily.   Psychiatric/Behavioral: Negative for behavioral problems, dysphoric mood, sleep disturbance and suicidal ideas. The patient is not nervous/anxious.          Objective:     Vitals:    03/10/20 1328   BP: 136/78   BP Location: Right arm   Patient Position: Sitting   BP Method: Large (Manual)   Pulse: 63   Resp: 18   Temp: 97.8 °F (36.6 °C)   TempSrc: Oral   SpO2: 97%   Weight: 94.3 kg (207 lb 12.5 oz)   Height: 6' (1.829 m)          Physical Exam   Constitutional: He is oriented to person, place, and time. He appears well-developed and well-nourished. No distress.   + overweight with Body mass index is 28.18 kg/m².       HENT:   Head: Normocephalic.   Right Ear: Tympanic membrane normal.   Left Ear: Tympanic membrane and external ear normal.   Nose: Nose normal.   Mouth/Throat: Oropharynx is clear and moist. No oropharyngeal exudate.   Negative Huffman Hallpike testing but did later have + symptoms with laying down for orthostatic vital signs.    Right EAC with cerumen impaction - removed with ear irrigation and TM WNL   Eyes: Pupils are equal, round, and reactive to light. EOM are normal. Right eye exhibits no discharge. Left eye exhibits no discharge. No scleral icterus.   Peripheral vision intact.  NO vision changes.   Neck: Normal range of motion. Neck supple. No JVD present. No tracheal deviation present. No thyromegaly present.    Cardiovascular: Normal rate, regular rhythm and normal heart sounds.   No murmur heard.  Pulmonary/Chest: Effort normal and breath sounds normal. No stridor. No respiratory distress.   Abdominal: Soft.   Musculoskeletal: Normal range of motion. He exhibits no edema.   Lymphadenopathy:     He has no cervical adenopathy.   Neurological: He is alert and oriented to person, place, and time. No cranial nerve deficit. Coordination normal.   No facial droop.  Speech clear.  Tongue midline.  All extremities with equal strength and ROM   Skin: Skin is warm and dry. No rash noted. He is not diaphoretic.   Psychiatric: His behavior is normal. His mood appears anxious.         Assessment:         ICD-10-CM ICD-9-CM   1. Benign paroxysmal positional vertigo, unspecified laterality H81.10 386.11   2. Impacted cerumen of right ear H61.21 380.4   3. Chronic GERD K21.9 530.81   4. Hyperplastic polyp of stomach K31.7 211.1       Plan:       Benign paroxysmal positional vertigo, unspecified laterality  -  Take Meclizine 25 mg TID prn dizziness.  Do head exercises as per handouts.  If no improvement, see ENT MD for further evaluation.  -  Warned of red flag signs/symptoms that would warrant patient to go to ER such as slurred speech, one-sided weakness, etc.  -     meclizine (ANTIVERT) 25 mg tablet; Take 1 tablet (25 mg total) by mouth 3 (three) times daily as needed for Dizziness.  Dispense: 30 tablet; Refill: 0    Impacted cerumen of right ear  -  Removed with irrigation  -     Ear wax removal    Chronic GERD  -  Advised patient that he has a history of stomach polyps and his GI MD, Dr. Montemayor should be filling medication if he wants him to continue - patient states that he did get letter in mail that he is overdue for follow up.  Agreed to fill medication until patient can see Dr. Montemayor.  -     omeprazole (PRILOSEC) 20 MG capsule; Take 1 capsule (20 mg total) by mouth once daily.  Dispense: 90 capsule; Refill: 0    Hyperplastic  polyp of stomach        Follow up if symptoms worsen or fail to improve.     Patient's Medications   New Prescriptions    MECLIZINE (ANTIVERT) 25 MG TABLET    Take 1 tablet (25 mg total) by mouth 3 (three) times daily as needed for Dizziness.   Previous Medications    CHOLECALCIFEROL, VITAMIN D3, (VITAMIN D3) 1,000 UNIT CAPSULE    Take 1 capsule (1,000 Units total) by mouth once daily.    CYANOCOBALAMIN (VITAMIN B-12) 1000 MCG TABLET    Take 1 tablet (1,000 mcg total) by mouth once daily.    ROSUVASTATIN (CRESTOR) 5 MG TABLET    TAKE 1 TABLET DAILY    VALSARTAN (DIOVAN) 160 MG TABLET    Take 1 tablet (160 mg total) by mouth once daily.   Modified Medications    Modified Medication Previous Medication    OMEPRAZOLE (PRILOSEC) 20 MG CAPSULE omeprazole (PRILOSEC) 20 MG capsule       Take 1 capsule (20 mg total) by mouth once daily.    Take 1 capsule (20 mg total) by mouth once daily.   Discontinued Medications    BROMPHENIRAMINE-PSEUDOEPH-DM (BROMFED DM) 2-30-10 MG/5 ML SYRP    Take 10 mLs by mouth every 4 (four) hours as needed.    FLUTICASONE PROPIONATE (FLONASE) 50 MCG/ACTUATION NASAL SPRAY    2 SPRAYS (100 MCG TOTAL) BY EACH NOSTRIL ROUTE ONCE DAILY.

## 2020-03-10 NOTE — TELEPHONE ENCOUNTER
----- Message from My Aranda sent at 3/10/2020 11:17 AM CDT -----  Contact: [phone]-self  Patient is requesting a call back concerning a Same Day appt for feeling Dizzy and having Nauseated. Please call

## 2020-03-10 NOTE — PATIENT INSTRUCTIONS
Benign Paroxysmal Positional Vertigo    Benign paroxysmal positional vertigo is a common condition. You feel as if the room is spinning after changing position, moving your head quickly, or even just rolling over in bed.  Vertigo is a false feeling of motion plus disorientation that makes it seem as though the room is spinning. A vertigo attack may cause sudden nausea, vomiting, and heavy sweating. Severe vertigo causes a loss of balance. You may even fall down.  Vertigo is caused by a problem with the inner ear. The inner ear is located behind the middle ear. It is a part of the balance center of the body. It contains small calcium particles within fluid-filled canals (semi-circular canals). These particles can move out of position as a result of aging, head trauma, or disease of the inner ear. Once that happens, moving your head in certain ways may cause the particles to stimulate the inner ear. This creates the feeling of vertigo.  An episode of vertigo may last seconds, minutes, or hours. Once you are over the first episode of vertigo, it may never return. Sometimes symptoms return off and on over several weeks or longer.  Home care  Follow these guidelines when caring for yourself at home:  · Rest quietly in bed if your symptoms are severe. Change position slowly. There is usually one position that will feel best. This might be lying on one side or lying on your back with your head slightly raised on pillows.  · Do not drive or work with dangerous machinery for one week after symptoms disappear. This is in case symptoms return suddenly.  · Take medicine as prescribed to relieve your symptoms. Unless another medicine was prescribed for nausea, vomiting, and vertigo, you may use over-the-counter motion sickness medicine, such as meclizine or dimenhydrinate.  Follow-up care  Follow up with your healthcare provider, or as directed. Tell your provider about any ringing in the ear or hearing loss.  If you had a CT  or MRI scan, a specialist will review it. You will be told of any new findings that may affect your care.  When to seek medical advice  Call your healthcare provider right away if any of these occur:  · Vertigo gets worse even after taking prescribed medicine  · Repeated vomiting even after taking prescribed medicine  · Increased weakness or fainting  · Severe headache or unusual drowsiness or confusion  · Weakness of an arm or leg or one side of the face  · Difficulty walking  · Difficulty with speech or vision  · Seizure  · Trouble hearing  · Fever of 100.4ºF (38ºC) or higher, or as directed by your healthcare provider  · Fast heart rate  · Chest pain   Date Last Reviewed: 7/10/2015  © 9261-6966 [employer]. [address] [place] [address], [place] [address]. All rights reserved. This information is not intended as a substitute for professional medical care. Always follow your healthcare professional's instructions.

## 2020-03-20 DIAGNOSIS — I10 ESSENTIAL HYPERTENSION: ICD-10-CM

## 2020-03-20 RX ORDER — VALSARTAN 80 MG/1
TABLET ORAL
Qty: 90 TABLET | Refills: 3 | OUTPATIENT
Start: 2020-03-20

## 2020-03-23 ENCOUNTER — PATIENT MESSAGE (OUTPATIENT)
Dept: UROLOGY | Facility: CLINIC | Age: 60
End: 2020-03-23

## 2020-03-23 DIAGNOSIS — C61 PROSTATE CANCER: Primary | ICD-10-CM

## 2020-03-23 DIAGNOSIS — R31.0 GROSS HEMATURIA: ICD-10-CM

## 2020-03-23 DIAGNOSIS — R35.1 NOCTURIA: ICD-10-CM

## 2020-03-24 ENCOUNTER — PATIENT MESSAGE (OUTPATIENT)
Dept: UROLOGY | Facility: CLINIC | Age: 60
End: 2020-03-24

## 2020-03-27 ENCOUNTER — TELEPHONE (OUTPATIENT)
Dept: UROLOGY | Facility: CLINIC | Age: 60
End: 2020-03-27

## 2020-03-27 NOTE — TELEPHONE ENCOUNTER
----- Message from Sally Aragon MA sent at 3/24/2020 10:57 AM CDT -----  I would like a call with the results of my blood test.      My number is [phone].      I will reschedule an appointment when things get back to normal.      Thanks.

## 2020-03-27 NOTE — TELEPHONE ENCOUNTER
I returned a call to the patient related to him that his PSA was 0.89 which is very similar to where was a year and half ago and a year ago.  I told him that I thought this was stable in that he could follow up in 3 months when all the COVID-19 virus issues had level out.

## 2020-05-29 ENCOUNTER — PATIENT MESSAGE (OUTPATIENT)
Dept: FAMILY MEDICINE | Facility: CLINIC | Age: 60
End: 2020-05-29

## 2020-05-29 ENCOUNTER — PATIENT MESSAGE (OUTPATIENT)
Dept: UROLOGY | Facility: CLINIC | Age: 60
End: 2020-05-29

## 2020-05-29 NOTE — TELEPHONE ENCOUNTER
Labs are ALREADY ordered - whoever reschedule his lab appt from 5/26 to 5/27 did not link my orders - only linked Dr. Arana order SO    Patient either has to reschedule labs prior to office visit on 6/4/2020 or reschedule his wellness exam - please contact patient to set up.  See patient message.

## 2020-06-02 ENCOUNTER — OFFICE VISIT (OUTPATIENT)
Dept: FAMILY MEDICINE | Facility: CLINIC | Age: 60
End: 2020-06-02
Payer: COMMERCIAL

## 2020-06-02 VITALS
DIASTOLIC BLOOD PRESSURE: 82 MMHG | SYSTOLIC BLOOD PRESSURE: 120 MMHG | HEIGHT: 71 IN | WEIGHT: 206.88 LBS | BODY MASS INDEX: 28.96 KG/M2 | HEART RATE: 82 BPM | TEMPERATURE: 98 F | RESPIRATION RATE: 16 BRPM | OXYGEN SATURATION: 97 %

## 2020-06-02 DIAGNOSIS — I10 ESSENTIAL HYPERTENSION: ICD-10-CM

## 2020-06-02 DIAGNOSIS — R53.83 FATIGUE, UNSPECIFIED TYPE: ICD-10-CM

## 2020-06-02 DIAGNOSIS — Z00.00 ANNUAL PHYSICAL EXAM: Primary | ICD-10-CM

## 2020-06-02 DIAGNOSIS — N40.1 BENIGN NON-NODULAR PROSTATIC HYPERPLASIA WITH LOWER URINARY TRACT SYMPTOMS: ICD-10-CM

## 2020-06-02 DIAGNOSIS — E78.2 MIXED HYPERLIPIDEMIA: ICD-10-CM

## 2020-06-02 DIAGNOSIS — Z23 NEED FOR STREPTOCOCCUS PNEUMONIAE VACCINATION: ICD-10-CM

## 2020-06-02 DIAGNOSIS — C61 PROSTATE CANCER: ICD-10-CM

## 2020-06-02 DIAGNOSIS — K21.9 CHRONIC GERD: ICD-10-CM

## 2020-06-02 PROCEDURE — 99999 PR PBB SHADOW E&M-EST. PATIENT-LVL III: ICD-10-PCS | Mod: PBBFAC,,, | Performed by: NURSE PRACTITIONER

## 2020-06-02 PROCEDURE — 3079F PR MOST RECENT DIASTOLIC BLOOD PRESSURE 80-89 MM HG: ICD-10-PCS | Mod: CPTII,S$GLB,, | Performed by: NURSE PRACTITIONER

## 2020-06-02 PROCEDURE — 90471 PNEUMOCOCCAL POLYSACCHARIDE VACCINE 23-VALENT =>2YO SQ IM: ICD-10-PCS | Mod: S$GLB,,, | Performed by: NURSE PRACTITIONER

## 2020-06-02 PROCEDURE — 3074F SYST BP LT 130 MM HG: CPT | Mod: CPTII,S$GLB,, | Performed by: NURSE PRACTITIONER

## 2020-06-02 PROCEDURE — 90732 PPSV23 VACC 2 YRS+ SUBQ/IM: CPT | Mod: S$GLB,,, | Performed by: NURSE PRACTITIONER

## 2020-06-02 PROCEDURE — 99999 PR PBB SHADOW E&M-EST. PATIENT-LVL III: CPT | Mod: PBBFAC,,, | Performed by: NURSE PRACTITIONER

## 2020-06-02 PROCEDURE — 99396 PR PREVENTIVE VISIT,EST,40-64: ICD-10-PCS | Mod: 25,S$GLB,, | Performed by: NURSE PRACTITIONER

## 2020-06-02 PROCEDURE — 3074F PR MOST RECENT SYSTOLIC BLOOD PRESSURE < 130 MM HG: ICD-10-PCS | Mod: CPTII,S$GLB,, | Performed by: NURSE PRACTITIONER

## 2020-06-02 PROCEDURE — 90471 IMMUNIZATION ADMIN: CPT | Mod: S$GLB,,, | Performed by: NURSE PRACTITIONER

## 2020-06-02 PROCEDURE — 90732 PNEUMOCOCCAL POLYSACCHARIDE VACCINE 23-VALENT =>2YO SQ IM: ICD-10-PCS | Mod: S$GLB,,, | Performed by: NURSE PRACTITIONER

## 2020-06-02 PROCEDURE — 99396 PREV VISIT EST AGE 40-64: CPT | Mod: 25,S$GLB,, | Performed by: NURSE PRACTITIONER

## 2020-06-02 PROCEDURE — 3079F DIAST BP 80-89 MM HG: CPT | Mod: CPTII,S$GLB,, | Performed by: NURSE PRACTITIONER

## 2020-06-02 RX ORDER — VIT C/E/ZN/COPPR/LUTEIN/ZEAXAN 250MG-90MG
1000 CAPSULE ORAL DAILY
Qty: 90 CAPSULE | Refills: 3 | Status: SHIPPED | OUTPATIENT
Start: 2020-06-02 | End: 2020-12-01 | Stop reason: SDUPTHER

## 2020-06-02 RX ORDER — LANOLIN ALCOHOL/MO/W.PET/CERES
1000 CREAM (GRAM) TOPICAL DAILY
Qty: 90 TABLET | Refills: 3 | Status: SHIPPED | OUTPATIENT
Start: 2020-06-02 | End: 2020-12-01 | Stop reason: SDUPTHER

## 2020-06-02 NOTE — PROGRESS NOTES
"Subjective:       Patient ID: Diallo Dawkins is a 59 y.o. male.    Chief Complaint: Annual Exam    Patient is a 59 year old white male with Hypertension, IFG, Prostate Cancer with BPH followed by Dr. Arana, mild anemia,  and chronic GERD followed by Dr. Montemayor that is here today for annual physical exam with fasting lab results..     Patient has Hypertension and takes Valsartan 160 mg daily. Blood pressure is controlled.  /82   Pulse 82   Temp 98.1 °F (36.7 °C) (Oral)   Resp 16   Ht 5' 11" (1.803 m)   Wt 93.9 kg (206 lb 14.4 oz)   SpO2 97%   BMI 28.86 kg/m²      Patient has Prostate Cancer that was diagnosed around May 2018 when had prostate biopsy done but has not required any treatment.  Patient has BPH  that is being followed by Dr. Arana.       Patient had a mild anemia on wellness labs in April 2018 and had complained of fatigue.  Before knowing patient had a history of pernicious anemia, I had labs done for workup.  Family later contacted me to report that patient had history of pernicious anemia previously diagnosed.  The CBC shows stable labs since 2016.  His iron and Ferritin levels were normal.  His Vitamin D level was normal but on low end of normal at 35.  His Vitamin B12 level was also normal at 386 but on the lower end of normal - recommended daily supplementation at April 2018 visit - patient reports he has since run out of and quit taking the vitamins.  His anemia was almost resolved with this month labs.  Still advise on daily vitamin D and B12 supplements.     Patient has an  with HgbA1C of 5.1% at May 2018 visit - had advised on lifestyle modifications.  TODAY, IFG is now 104 with HgbA1C of 5.2% - levels are now within range.     Patient has Hyperlipidemia and takes Rosuvastatin 5 mg daily.  Patient admits to noncompliance with taking daily medication.     Wellness Labs:  - The Anemia is mostly resolved  --  CMP okay - IFG has resolved  -  Cholesterol increasing from " last labs - noncompliance with daily med - will start back taking daily and recheck in 6 months.  -  TSH WNL  -  Prostate is monitored by Dr. Arana.     Health Maintenance:   -  Prevnar 23 vaccination due because of the prostate cancer history - will be given today.      Component      Latest Ref Rng & Units 5/30/2020 11/20/2019 5/23/2019 5/10/2018              8:01 AM   WBC      3.90 - 12.70 K/uL 4.42  4.69 3.97   RBC      4.60 - 6.20 M/uL 4.47 (L)  4.59 (L) 4.36 (L)   Hemoglobin      14.0 - 18.0 g/dL 14.1  14.6 14.0   Hematocrit      40.0 - 54.0 % 40.7  40.8 38.6 (L)   MCV      82 - 98 fL 91  89 89   MCH      27.0 - 31.0 pg 31.5 (H)  31.8 (H) 32.1 (H)   MCHC      32.0 - 36.0 g/dL 34.6  35.8 36.3 (H)   RDW      11.5 - 14.5 % 13.3  14.2 13.6   Platelets      150 - 350 K/uL 177  195 179   MPV      9.2 - 12.9 fL 10.0  10.0 10.5   Immature Granulocytes      0.0 - 0.5 % 0.2      Gran # (ANC)      1.8 - 7.7 K/uL 2.4  2.6 2.1   Immature Grans (Abs)      0.00 - 0.04 K/uL 0.01      Lymph #      1.0 - 4.8 K/uL 1.4  1.5 1.3   Mono #      0.3 - 1.0 K/uL 0.5  0.5 0.4   Eos #      0.0 - 0.5 K/uL 0.1  0.1 0.1   Baso #      0.00 - 0.20 K/uL 0.03  0.03 0.02   nRBC      0 /100 WBC 0      Gran%      38.0 - 73.0 % 54.3  54.4 53.2   Lymph%      18.0 - 48.0 % 31.2  31.1 33.2   Mono%      4.0 - 15.0 % 10.9  10.9 10.6   Eosinophil%      0.0 - 8.0 % 2.7  3.0 2.5   Basophil%      0.0 - 1.9 % 0.7  0.6 0.5   Differential Method       Automated  Automated Automated   Aniso             Sodium      136 - 145 mmol/L 141 143 144 141   Potassium      3.5 - 5.1 mmol/L 4.7 3.8 4.7 4.1   Chloride      95 - 110 mmol/L 105 108 106 105   CO2      23 - 29 mmol/L 28 27 26 24   Glucose      70 - 110 mg/dL 104 109 126 (H) 116 (H)   BUN, Bld      2 - 20 mg/dL 23 (H) 20 21 (H) 20   Creatinine      0.50 - 1.40 mg/dL 0.85 0.93 0.94 0.83   Calcium      8.7 - 10.5 mg/dL 9.4 8.8 9.5 9.1   PROTEIN TOTAL      6.0 - 8.4 g/dL 7.4 7.1 7.7    Albumin      3.5 - 5.2 g/dL  4.3 4.2 4.5    BILIRUBIN TOTAL      0.1 - 1.0 mg/dL 0.7 0.6 0.5    Alkaline Phosphatase      38 - 126 U/L 46 53 51    AST      15 - 46 U/L 35 29 34    ALT      10 - 44 U/L 27 27 35    Anion Gap      8 - 16 mmol/L 8 8 12 12   eGFR if African American      >60 mL/min/1.73 m:2 >60.0 >60.0 >60.0 >60.0   eGFR if non African American      >60 mL/min/1.73 m:2 >60.0 >60.0 >60.0 >60.0   Cholesterol      120 - 199 mg/dL 162 124 185    Triglycerides      30 - 150 mg/dL 142 93 165 (H)    HDL      40 - 75 mg/dL 33 (L) 38 (L) 37 (L)    LDL Cholesterol External      63.0 - 159.0 mg/dL 100.6 67.4 115.0    Hdl/Cholesterol Ratio      20.0 - 50.0 % 20.4 30.6 20.0    Total Cholesterol/HDL Ratio      2.0 - 5.0 4.9 3.3 5.0    Non-HDL Cholesterol      mg/dL 129 86 148    Hemoglobin A1C External      4.0 - 5.6 % 5.2 5.2 5.2    Estimated Avg Glucose      68 - 131 mg/dL 103 103 103    TSH      0.400 - 4.000 uIU/mL 3.030  2.400      Component      Latest Ref Rng & Units 5/27/2020 3/26/2020 8/1/2019 4/30/2019   PSA Total      0.00 - 4.00 ng/mL 1.1 0.89 0.59 0.82   PSA, Free      0.01 - 1.50 ng/mL 0.69 0.66 0.35 0.47   PSA, Free Pct      Not established % 62.73 74.16 59.32 57.32     Current Outpatient Medications   Medication Sig Dispense Refill    cholecalciferol, vitamin D3, (VITAMIN D3) 1,000 unit capsule Take 1 capsule (1,000 Units total) by mouth once daily. 90 capsule 3    rosuvastatin (CRESTOR) 5 MG tablet TAKE 1 TABLET DAILY 90 tablet 4    valsartan (DIOVAN) 160 MG tablet Take 1 tablet (160 mg total) by mouth once daily. 90 tablet 1    cyanocobalamin (VITAMIN B-12) 1000 MCG tablet Take 1 tablet (1,000 mcg total) by mouth once daily. (Patient not taking: Reported on 6/2/2020) 90 tablet 3    omeprazole (PRILOSEC) 20 MG capsule Take 1 capsule (20 mg total) by mouth once daily. (Patient not taking: Reported on 6/2/2020) 90 capsule 0     No current facility-administered medications for this visit.        Past Medical History:    Diagnosis Date    Benign non-nodular prostatic hyperplasia with lower urinary tract symptoms 07/19/2016    Followed by Dr. Arana    Elevated PSA, less than 10 ng/ml 5/9/2018    Essential hypertension 3/9/2018    GERD with esophagitis     EGD done 12/11/2018 with Dr. Mynor Montemayor    Pernicious anemia     per patient report    Prostate cancer 8/31/2018    prostate biopsy 5/14/2018 - no treatment required - being followed by [facility] Dr. Arana.    Urinary tract infection        Past Surgical History:   Procedure Laterality Date    COLONOSCOPY  05/24/2018    [facility] Dr. Mynor Montemayor--Polyps (6 mm) in ascending colon and hepatic flexure, Angioectasia in ascending colon, Internal Hemorrhoids.   Colonoscopy in 5 years     OTHER SURGICAL HISTORY      angiogram on wrist    PROSTATE BIOPSY  05/14/2018    done by Dr. Arana.    UPPER GASTROINTESTINAL ENDOSCOPY  12/11/2018    Dr. Montemayor - [facility] - + 7mm polyp in the cardia biopsied - hyperplastic polyp.  GERD with mild chronic esophagitis present.       Family History   Problem Relation Age of Onset    Cancer Paternal Grandmother     No Known Problems Mother     Heart disease Father         passed age75    Hypertension Father     Hyperlipidemia Father     No Known Problems Sister     No Known Problems Sister     No Known Problems Sister     Kidney disease Neg Hx     Prostate cancer Neg Hx        Social History     Socioeconomic History    Marital status: Single     Spouse name: Not on file    Number of children: Not on file    Years of education: Not on file    Highest education level: Not on file   Occupational History    Occupation: sales   Social Needs    Financial resource strain: Not on file    Food insecurity:     Worry: Not on file     Inability: Not on file    Transportation needs:     Medical: Not on file     Non-medical: Not on file   Tobacco Use    Smoking status: Never Smoker    Smokeless tobacco: Never  Used   Substance and Sexual Activity    Alcohol use: Yes     Comment: twice a month - 2 drinks per occasion    Drug use: No    Sexual activity: Yes     Partners: Female   Lifestyle    Physical activity:     Days per week: Not on file     Minutes per session: Not on file    Stress: Not on file   Relationships    Social connections:     Talks on phone: Not on file     Gets together: Not on file     Attends Protestant service: Not on file     Active member of club or organization: Not on file     Attends meetings of clubs or organizations: Not on file     Relationship status: Not on file   Other Topics Concern    Not on file   Social History Narrative    Not on file       Review of Systems   Constitutional: Negative for activity change, appetite change, fatigue, fever and unexpected weight change.   HENT: Negative for congestion, ear pain, mouth sores, nosebleeds, postnasal drip, rhinorrhea, sinus pressure, sneezing, sore throat, trouble swallowing and voice change.    Eyes: Negative.    Respiratory: Negative for cough, chest tightness and shortness of breath.    Cardiovascular: Negative for chest pain, palpitations and leg swelling.   Gastrointestinal: Negative.  Negative for abdominal pain, blood in stool, constipation, diarrhea, nausea and vomiting.   Endocrine: Negative.    Genitourinary: Negative for difficulty urinating, dysuria, flank pain, hematuria and urgency.   Musculoskeletal: Negative for arthralgias, back pain, gait problem, joint swelling, myalgias and neck pain.   Skin: Negative for color change, rash and wound.   Allergic/Immunologic: Negative for immunocompromised state.   Neurological: Negative for dizziness, tremors, seizures, syncope, speech difficulty and headaches.   Hematological: Negative for adenopathy. Does not bruise/bleed easily.   Psychiatric/Behavioral: Negative for behavioral problems, dysphoric mood, sleep disturbance and suicidal ideas. The patient is not nervous/anxious.       "    Objective:     Vitals:    06/02/20 1631 06/02/20 1703   BP: (!) 130/90 120/82   BP Location: Left arm    Patient Position: Sitting    BP Method: Medium (Manual)    Pulse: 82    Resp: 16    Temp: 98.1 °F (36.7 °C)    TempSrc: Oral    SpO2: 97%    Weight: 93.9 kg (206 lb 14.4 oz)    Height: 5' 11" (1.803 m)           Physical Exam   Constitutional: He is oriented to person, place, and time. He appears well-developed and well-nourished.   Body mass index is 28.86 kg/m².     HENT:   Head: Normocephalic.   Right Ear: External ear normal.   Left Ear: External ear normal.   Nose: Nose normal.   Mouth/Throat: Oropharynx is clear and moist. No oropharyngeal exudate.   Eyes: Pupils are equal, round, and reactive to light. EOM are normal. Right eye exhibits no discharge. Left eye exhibits no discharge. No scleral icterus.   Neck: Normal range of motion. Neck supple. No tracheal deviation present. No thyromegaly present.   Cardiovascular: Normal rate, regular rhythm and normal heart sounds.   No murmur heard.  Pulmonary/Chest: Effort normal and breath sounds normal. No respiratory distress.   Abdominal: Soft. He exhibits no distension.   Genitourinary:   Genitourinary Comments: Deferred to urologist   Musculoskeletal: Normal range of motion. He exhibits no edema.   Lymphadenopathy:     He has no cervical adenopathy.   Neurological: He is alert and oriented to person, place, and time. Coordination normal.   Skin: Skin is warm and dry. No rash noted.   Psychiatric: He has a normal mood and affect. His behavior is normal.         Assessment:         ICD-10-CM ICD-9-CM   1. Annual physical exam Z00.00 V70.0   2. Essential hypertension I10 401.9   3. Mixed hyperlipidemia E78.2 272.2   4. Prostate cancer C61 185   5. Benign non-nodular prostatic hyperplasia with lower urinary tract symptoms N40.1 600.91   6. Chronic GERD K21.9 530.81   7. BMI 28.0-28.9,adult Z68.28 V85.24   8. Fatigue, unspecified type R53.83 780.79   9. Need for " Streptococcus pneumoniae vaccination Z23 V03.82       Plan:       Annual physical exam  -  Pneumonia vaccine today  Health Maintenance Summary     Pneumococcal Vaccine (Highest Risk) Overdue 7/25/2019 Scheduled today    Done 5/30/2019 Imm Admin: Pneumococcal Conjugate - 13 Valent   HIV Screening Postponed 6/1/2021 Originally 8/16/1975. Patient Scheduled   Colonoscopy Next Due 5/24/2023     Done 5/24/2018 [facility]-- Please see media    Done 1/1/2011 Had polyps, done by MD at    Lipid Panel Next Due 5/30/2025     Done 5/30/2020 LIPID PANEL     Done 11/20/2019 LIPID PANEL     Done 5/23/2019 LIPID PANEL     Done 4/9/2018 LIPID PANEL     Done 3/23/2017 LIPID PANEL    TETANUS VACCINE Next Due 1/23/2027     Done 1/23/2017 Imm Admin: Tdap   Hepatitis C Screening This plan is no longer active.     Done 3/23/2017 HEPATITIS C ANTIBODY   Shingles Vaccine This plan is no longer active.     Done 2/2/2019 Imm Admin: Zoster Recombinant    Done 10/4/2018 Imm Admin: Zoster Recombinant   Influenza Vaccine This plan is no longer active.     Done 12/9/2019 Imm Admin: Influenza - Quadrivalent - PF (6 months and older)    Done 1/23/2017 Imm Admin: Influenza - Quadrivalent - PF (6 months and older)         Essential hypertension  -  Controlled on Valsartan at present dose - recheck in 6 months.  -     Comprehensive metabolic panel; Future; Expected date: 06/02/2020    Mixed hyperlipidemia  -  Get back to taking medication every single day and recheck in 6 months.  -     Lipid Panel; Future; Expected date: 06/02/2020    Prostate cancer  -  Followed by Dr. Arana.  -     Pneumococcal Polysaccharide Vaccine (23 Valent) (SQ/IM)    Benign non-nodular prostatic hyperplasia with lower urinary tract symptoms  -  Followed by Dr. arana.    Chronic GERD  -  Has appt with GI Dr. Montemayor for follow up.  Advised all GI meds should come from specialist.    BMI 28.0-28.9,adult    Fatigue, unspecified type  -  Can continue  vitamins daily  -     cyanocobalamin (VITAMIN B-12) 1000 MCG tablet; Take 1 tablet (1,000 mcg total) by mouth once daily.  Dispense: 90 tablet; Refill: 3  -     cholecalciferol, vitamin D3, (VITAMIN D3) 25 mcg (1,000 unit) capsule; Take 1 capsule (1,000 Units total) by mouth once daily.  Dispense: 90 capsule; Refill: 3    Need for Streptococcus pneumoniae vaccination  -     Pneumococcal Polysaccharide Vaccine (23 Valent) (SQ/IM)      Follow up in about 6 months (around 12/2/2020) for fasting labs and follow up visit.     Patient's Medications   New Prescriptions    No medications on file   Previous Medications    OMEPRAZOLE (PRILOSEC) 20 MG CAPSULE    Take 1 capsule (20 mg total) by mouth once daily.    ROSUVASTATIN (CRESTOR) 5 MG TABLET    TAKE 1 TABLET DAILY    VALSARTAN (DIOVAN) 160 MG TABLET    Take 1 tablet (160 mg total) by mouth once daily.   Modified Medications    Modified Medication Previous Medication    CHOLECALCIFEROL, VITAMIN D3, (VITAMIN D3) 25 MCG (1,000 UNIT) CAPSULE cholecalciferol, vitamin D3, (VITAMIN D3) 1,000 unit capsule       Take 1 capsule (1,000 Units total) by mouth once daily.    Take 1 capsule (1,000 Units total) by mouth once daily.    CYANOCOBALAMIN (VITAMIN B-12) 1000 MCG TABLET cyanocobalamin (VITAMIN B-12) 1000 MCG tablet       Take 1 tablet (1,000 mcg total) by mouth once daily.    Take 1 tablet (1,000 mcg total) by mouth once daily.   Discontinued Medications    MECLIZINE (ANTIVERT) 25 MG TABLET    Take 1 tablet (25 mg total) by mouth 3 (three) times daily as needed for Dizziness.

## 2020-06-03 ENCOUNTER — PATIENT OUTREACH (OUTPATIENT)
Dept: ADMINISTRATIVE | Facility: HOSPITAL | Age: 60
End: 2020-06-03

## 2020-06-03 ENCOUNTER — TELEPHONE (OUTPATIENT)
Dept: ADMINISTRATIVE | Facility: HOSPITAL | Age: 60
End: 2020-06-03

## 2020-08-19 DIAGNOSIS — I10 ESSENTIAL HYPERTENSION: ICD-10-CM

## 2020-08-19 RX ORDER — VALSARTAN 160 MG/1
160 TABLET ORAL DAILY
Qty: 90 TABLET | Refills: 1 | Status: SHIPPED | OUTPATIENT
Start: 2020-08-19 | End: 2021-02-16

## 2020-08-19 NOTE — TELEPHONE ENCOUNTER
----- Message from Rose Merlos sent at 8/19/2020  3:25 PM CDT -----  Regarding: pharmacy  Contact: Pharm  Type:  Pharmacy Calling to Clarify an RX    Name of Caller:   Pharmacy Name: [facility] - [place], [place] - [address] [place] [address]  Prescription Name: valsartan (DIOVAN) 160 MG tablet  What do they need to clarify?: needs a renewal   Best Call Back Number: [phone]  Additional Information:  REF#: [id number]

## 2020-09-24 ENCOUNTER — OFFICE VISIT (OUTPATIENT)
Dept: FAMILY MEDICINE | Facility: CLINIC | Age: 60
End: 2020-09-24
Payer: COMMERCIAL

## 2020-09-24 VITALS
HEIGHT: 71 IN | HEART RATE: 73 BPM | TEMPERATURE: 97 F | DIASTOLIC BLOOD PRESSURE: 88 MMHG | SYSTOLIC BLOOD PRESSURE: 130 MMHG | WEIGHT: 205.69 LBS | BODY MASS INDEX: 28.8 KG/M2 | RESPIRATION RATE: 18 BRPM | OXYGEN SATURATION: 98 %

## 2020-09-24 DIAGNOSIS — G47.00 INSOMNIA, UNSPECIFIED TYPE: ICD-10-CM

## 2020-09-24 DIAGNOSIS — R20.2 NUMBNESS AND TINGLING IN LEFT ARM: Primary | ICD-10-CM

## 2020-09-24 DIAGNOSIS — R20.0 NUMBNESS AND TINGLING IN LEFT ARM: Primary | ICD-10-CM

## 2020-09-24 DIAGNOSIS — R05.3 CHRONIC COUGH: ICD-10-CM

## 2020-09-24 PROCEDURE — 93005 ELECTROCARDIOGRAM TRACING: CPT | Mod: S$GLB,,, | Performed by: NURSE PRACTITIONER

## 2020-09-24 PROCEDURE — 93005 EKG 12-LEAD: ICD-10-PCS | Mod: S$GLB,,, | Performed by: NURSE PRACTITIONER

## 2020-09-24 PROCEDURE — 3008F BODY MASS INDEX DOCD: CPT | Mod: CPTII,S$GLB,, | Performed by: NURSE PRACTITIONER

## 2020-09-24 PROCEDURE — 99999 PR PBB SHADOW E&M-EST. PATIENT-LVL V: ICD-10-PCS | Mod: PBBFAC,,, | Performed by: NURSE PRACTITIONER

## 2020-09-24 PROCEDURE — 3079F PR MOST RECENT DIASTOLIC BLOOD PRESSURE 80-89 MM HG: ICD-10-PCS | Mod: CPTII,S$GLB,, | Performed by: NURSE PRACTITIONER

## 2020-09-24 PROCEDURE — 93010 ELECTROCARDIOGRAM REPORT: CPT | Mod: S$GLB,,, | Performed by: INTERNAL MEDICINE

## 2020-09-24 PROCEDURE — 93010 EKG 12-LEAD: ICD-10-PCS | Mod: S$GLB,,, | Performed by: INTERNAL MEDICINE

## 2020-09-24 PROCEDURE — 3075F PR MOST RECENT SYSTOLIC BLOOD PRESS GE 130-139MM HG: ICD-10-PCS | Mod: CPTII,S$GLB,, | Performed by: NURSE PRACTITIONER

## 2020-09-24 PROCEDURE — 3079F DIAST BP 80-89 MM HG: CPT | Mod: CPTII,S$GLB,, | Performed by: NURSE PRACTITIONER

## 2020-09-24 PROCEDURE — 99214 PR OFFICE/OUTPT VISIT, EST, LEVL IV, 30-39 MIN: ICD-10-PCS | Mod: S$GLB,,, | Performed by: NURSE PRACTITIONER

## 2020-09-24 PROCEDURE — 3008F PR BODY MASS INDEX (BMI) DOCUMENTED: ICD-10-PCS | Mod: CPTII,S$GLB,, | Performed by: NURSE PRACTITIONER

## 2020-09-24 PROCEDURE — 99214 OFFICE O/P EST MOD 30 MIN: CPT | Mod: S$GLB,,, | Performed by: NURSE PRACTITIONER

## 2020-09-24 PROCEDURE — 3075F SYST BP GE 130 - 139MM HG: CPT | Mod: CPTII,S$GLB,, | Performed by: NURSE PRACTITIONER

## 2020-09-24 PROCEDURE — 99999 PR PBB SHADOW E&M-EST. PATIENT-LVL V: CPT | Mod: PBBFAC,,, | Performed by: NURSE PRACTITIONER

## 2020-09-24 RX ORDER — PHENOL/SODIUM PHENOLATE
1 AEROSOL, SPRAY (ML) MUCOUS MEMBRANE DAILY
Qty: 30 EACH | COMMUNITY
Start: 2020-09-24 | End: 2020-12-01

## 2020-09-24 RX ORDER — BENZONATATE 100 MG/1
100 CAPSULE ORAL 3 TIMES DAILY PRN
Qty: 30 CAPSULE | Refills: 0 | Status: SHIPPED | OUTPATIENT
Start: 2020-09-24 | End: 2020-10-04

## 2020-09-24 NOTE — PROGRESS NOTES
Subjective:       Patient ID: Diallo Dawkins is a 60 y.o. male.    Chief Complaint: Cough (patient report having a chronic cough for 4 weeks), Tingling (patient report having tingling sensation in left arm), Fatigue, and Flu Vaccine    Patient is a 59 year old white male with Hypertension, IFG, Prostate Cancer with BPH followed by Dr. Arana, mild anemia,  and chronic GERD followed by Dr. Montemayor that is here today for multiple complaints:    Complaint #1:  Insomnia  Is CHRONIC. States this has been going on at least 1 year.  Reports falling asleep without any problems but waking up at 3AM.  Reports going to bed at 10PM.  Patient reports he has tried OTC Melatonin chews without relief.  NO stressors, no depression, no changes to diet, no stimulants.    Complaint #2:  Cough - see notes below    Complaint #3:  Left arm numbness - see notes below:    Cough  This is a new problem. Episode onset: started 4 weeks ago. The problem has been gradually worsening. The problem occurs hourly. The cough is productive of sputum (reports productive cough yellow/green sputum). Pertinent negatives include no chest pain, ear pain, fever, headaches, heartburn, hemoptysis, nasal congestion, postnasal drip, rash, rhinorrhea, sore throat, shortness of breath, weight loss or wheezing. Associated symptoms comments: Back of throat irritation that stimulates cough. Nothing aggravates the symptoms. Treatments tried: cold smoothies usually calm down the cough. There is no history of asthma, COPD or environmental allergies.   Arm Pain   Incident onset: started 2 to 3 weeks ago. There was no injury mechanism. Pain location: starts a left elbow. Quality: numbness/tingling sensation. The pain radiates to the chest and right arm. The pain is at a severity of 0/10. The patient is experiencing no pain. Associated symptoms include numbness and tingling. Pertinent negatives include no chest pain. Associated symptoms comments: Reports the  numbness/tingling to left arm starts at the left elbows and radiates up the left arm across the chest wall and down to the right elbow.  IT is numnbess/tingling.  NO pain.  No other associated symptoms.  Episodes usually occur at rest.  Reports the episodes maybe lasts 10 seconds and resolves.  Reports having about 1 episodes every other day for the past 3 weeks.. He has tried nothing for the symptoms. The treatment provided no relief.       Current Outpatient Medications   Medication Sig Dispense Refill    cholecalciferol, vitamin D3, (VITAMIN D3) 25 mcg (1,000 unit) capsule Take 1 capsule (1,000 Units total) by mouth once daily. 90 capsule 3    cyanocobalamin (VITAMIN B-12) 1000 MCG tablet Take 1 tablet (1,000 mcg total) by mouth once daily. 90 tablet 3    rosuvastatin (CRESTOR) 5 MG tablet TAKE 1 TABLET DAILY 90 tablet 4    valsartan (DIOVAN) 160 MG tablet Take 1 tablet (160 mg total) by mouth once daily. 90 tablet 1     No current facility-administered medications for this visit.        Past Medical History:   Diagnosis Date    Benign non-nodular prostatic hyperplasia with lower urinary tract symptoms 07/19/2016    Followed by Dr. Arana    Elevated PSA, less than 10 ng/ml 5/9/2018    Essential hypertension 3/9/2018    GERD with esophagitis     EGD done 12/11/2018 with Dr. Mynor Montemayor    Pernicious anemia     per patient report    Prostate cancer 8/31/2018    prostate biopsy 5/14/2018 - no treatment required - being followed by [facility] Dr. Arana.    Urinary tract infection        Past Surgical History:   Procedure Laterality Date    COLONOSCOPY  05/24/2018    [facility] Dr. Mynor Montemayor--Polyps (6 mm) in ascending colon and hepatic flexure, Angioectasia in ascending colon, Internal Hemorrhoids.   Colonoscopy in 5 years     OTHER SURGICAL HISTORY      angiogram on wrist    PROSTATE BIOPSY  05/14/2018    done by Dr. Arana.    UPPER GASTROINTESTINAL ENDOSCOPY  12/11/2018     Dr. Montemayor - [facility] - + 7mm polyp in the cardia biopsied - hyperplastic polyp.  GERD with mild chronic esophagitis present.       Family History   Problem Relation Age of Onset    Cancer Paternal Grandmother     No Known Problems Mother     Heart disease Father         passed age75    Hypertension Father     Hyperlipidemia Father     No Known Problems Sister     No Known Problems Sister     No Known Problems Sister     Kidney disease Neg Hx     Prostate cancer Neg Hx        Social History     Socioeconomic History    Marital status: Single     Spouse name: Not on file    Number of children: Not on file    Years of education: Not on file    Highest education level: Not on file   Occupational History    Occupation: sales   Social Needs    Financial resource strain: Not on file    Food insecurity     Worry: Not on file     Inability: Not on file    Transportation needs     Medical: Not on file     Non-medical: Not on file   Tobacco Use    Smoking status: Never Smoker    Smokeless tobacco: Never Used   Substance and Sexual Activity    Alcohol use: Yes     Comment: twice a month - 2 drinks per occasion    Drug use: No    Sexual activity: Yes     Partners: Female   Lifestyle    Physical activity     Days per week: Not on file     Minutes per session: Not on file    Stress: Not on file   Relationships    Social connections     Talks on phone: Not on file     Gets together: Not on file     Attends [employer] service: Not on file     Active member of club or organization: Not on file     Attends meetings of clubs or organizations: Not on file     Relationship status: Not on file   Other Topics Concern    Not on file   Social History Narrative    Not on file       Review of Systems   Constitutional: Negative for activity change, fever, unexpected weight change and weight loss.   HENT: Negative for ear pain, hearing loss, postnasal drip, rhinorrhea, sore throat and trouble swallowing.    Eyes:  "Negative for discharge and visual disturbance.   Respiratory: Positive for cough. Negative for hemoptysis, chest tightness, shortness of breath and wheezing.    Cardiovascular: Negative for chest pain and palpitations.   Gastrointestinal: Negative for blood in stool, constipation, diarrhea, heartburn and vomiting.   Endocrine: Negative for polydipsia and polyuria.   Genitourinary: Negative for difficulty urinating, hematuria and urgency.   Musculoskeletal: Negative for arthralgias, joint swelling and neck pain.   Skin: Negative for rash.   Allergic/Immunologic: Negative for environmental allergies.   Neurological: Positive for tingling and numbness. Negative for weakness and headaches.   Psychiatric/Behavioral: Positive for sleep disturbance. Negative for confusion and dysphoric mood.   All other systems reviewed and are negative.        Objective:     Vitals:    09/24/20 1145 09/24/20 1206   BP: (!) 130/98 130/88   BP Location: Left arm    Patient Position: Sitting    BP Method: Large (Manual)    Pulse: 73    Resp: 18    Temp: 96.8 °F (36 °C)    TempSrc: Oral    SpO2: 98%    Weight: 93.3 kg (205 lb 11 oz)    Height: 5' 11" (1.803 m)           Physical Exam  Constitutional:       General: He is not in acute distress.     Appearance: He is well-developed. He is not ill-appearing, toxic-appearing or diaphoretic.      Comments: Body mass index is 28.69 kg/m².   HENT:      Head: Normocephalic and atraumatic.      Right Ear: External ear normal.      Left Ear: External ear normal.      Nose: Nose normal.      Mouth/Throat:      Pharynx: No oropharyngeal exudate.   Eyes:      General: No scleral icterus.        Right eye: No discharge.         Left eye: No discharge.      Pupils: Pupils are equal, round, and reactive to light.   Neck:      Musculoskeletal: Normal range of motion and neck supple.      Thyroid: No thyromegaly.      Trachea: No tracheal deviation.   Cardiovascular:      Rate and Rhythm: Normal rate and " regular rhythm.      Heart sounds: Normal heart sounds. No murmur.      Comments: EKG; NORMAL SINUS RHYTHM  Pulmonary:      Effort: Pulmonary effort is normal. No respiratory distress.      Breath sounds: Normal breath sounds.   Abdominal:      General: There is no distension.      Palpations: Abdomen is soft.      Tenderness: There is no abdominal tenderness. There is no guarding.   Genitourinary:     Comments: Deferred to urologist  Musculoskeletal: Normal range of motion.   Lymphadenopathy:      Cervical: No cervical adenopathy.   Skin:     General: Skin is warm and dry.      Findings: No rash.   Neurological:      Mental Status: He is alert and oriented to person, place, and time.      Coordination: Coordination normal.   Psychiatric:         Mood and Affect: Mood normal.         Behavior: Behavior normal.         Thought Content: Thought content normal.         Judgment: Judgment normal.           Assessment:         ICD-10-CM ICD-9-CM   1. Numbness and tingling in left arm  R20.0 782.0    R20.2    2. Chronic cough  R05 786.2   3. Insomnia, unspecified type  G47.00 780.52       Plan:       Numbness and tingling in left arm  -  EKG:  NSR - do not suspect cardiovascular etiology.  -  Advised patient that if symptoms continue, he does have known cervical spondylosis followed by Orthopedic MD Dr. Galaviz - advised to discuss symptoms with orthopedics - may need nerve conduction study of left arm.  -     IN OFFICE EKG 12-LEAD (to Muse)    Chronic cough  -  Patient does have history of chronic GERD in past off of medication in past 3 months and no other URI s/s - likely Laryngopharyngeal Reflux - will treat with PPI and Tessalon perles.  -  In not improved after 4 weeks, see ENT MD for further evaluation.  Patient already has an ENT MD that has been following his Vertigo  -     benzonatate (TESSALON) 100 MG capsule; Take 1 capsule (100 mg total) by mouth 3 (three) times daily as needed for Cough.  Dispense: 30  capsule; Refill: 0  -     omeprazole 20 mg TbEC; Take 1 tablet by mouth once daily.  Dispense: 30 each    Insomnia, unspecified type  -  Patient declines medication.  Advised to monitor food intake and AVOID all sugars/high carb foods for supper.        Follow up if symptoms worsen or fail to improve.     Patient's Medications   New Prescriptions    BENZONATATE (TESSALON) 100 MG CAPSULE    Take 1 capsule (100 mg total) by mouth 3 (three) times daily as needed for Cough.    OMEPRAZOLE 20 MG TBEC    Take 1 tablet by mouth once daily.   Previous Medications    CHOLECALCIFEROL, VITAMIN D3, (VITAMIN D3) 25 MCG (1,000 UNIT) CAPSULE    Take 1 capsule (1,000 Units total) by mouth once daily.    CYANOCOBALAMIN (VITAMIN B-12) 1000 MCG TABLET    Take 1 tablet (1,000 mcg total) by mouth once daily.    ROSUVASTATIN (CRESTOR) 5 MG TABLET    TAKE 1 TABLET DAILY    VALSARTAN (DIOVAN) 160 MG TABLET    Take 1 tablet (160 mg total) by mouth once daily.   Modified Medications    No medications on file   Discontinued Medications    OMEPRAZOLE (PRILOSEC) 20 MG CAPSULE    Take 1 capsule (20 mg total) by mouth once daily.

## 2020-09-24 NOTE — PATIENT INSTRUCTIONS
Cough, Chronic, Uncertain Cause (Adult)    Everyone has had a cough as part of the common cold, flu, or bronchitis. This kind of cough occurs along with an achy feeling, low-grade fever, nasal and sinus congestion, and a scratchy or sore throat. This usually gets better in 2 to 3 weeks. A cough that lasts longer than 3 weeks may be due to other causes.  If your cough does not improve over the next 2 weeks, further testing may be needed. Follow up with your healthcare provider as advised. Cough suppressants may be recommended. Based on your exam today, the exact cause of your cough is not certain. Below are some common causes for persistent cough.  Smokers cough  Smokers cough doesnt go away. If you continue to smoke, it only gets worse. The cough is from irritation in the air passages. Talk to your healthcare provider about quitting. Medicines or nicotine-replacement products, like gum or the patch, may make quitting easier.  Postnasal drip  A cough that is worse at night may be due to postnasal drip. Excess mucus in the nose drains from the back of your nose to your throat. This triggers the cough reflex. Postnasal drip may be due to a sinus infection or allergy. Common allergens include dust, tobacco smoke (both inhaled and secondhand smoke), environmental pollutants, pollen, mold, pets, cleaning agents, room deodorizers, and chemical fumes. Over-the-counter antihistamines or decongestants may be helpful for allergies. A sinus infection may requires antibiotic treatment. See your healthcare provider if symptoms continue.  Medicines  Certain prescribed medicines can cause a chronic cough in some people:  · ACE inhibitors for high blood pressure. These include benazepril, captopril, enalapril, fosinopril, lisinopril, quinapril, ramipril, and others.  · Beta-blockers for high blood pressure and other conditions. These include propranolol, atenolol, metoprolol, nadolol, and others.  Let your healthcare provider  know if you are taking any of these.  Asthma  Cough may be the only sign of mild asthma. You may have tests to find out if asthma is causing your cough. You may also take asthma medicine on a trial basis.  Acid reflux (heartburn, GERD)  The esophagus is the tube that carries food from the mouth to the stomach. A valve at its lower end prevents stomach acids from flowing upward. If this valve does not work properly, acid from the stomach enters the esophagus. This may cause a burning pain in the upper abdomen or lower chest, belching, or cough. Symptoms are often worse when lying flat. Avoid eating or drinking before bedtime. Try using extra pillows to raise your upper body, or place 4-inch blocks under the head of your bed. You may try an over-the-counter antacid or an acid-blocking medicine such as famotidine, cimetidine, ranitidine, esomeprazole, lansoprazole, or omeprazole. Stronger medicines for this condition can be prescribed by your healthcare provider.  Follow-up care  Follow up with your healthcare provider, or as advised, if your cough does not improve. Further testing may be needed.  Note: If an X-ray was taken, a specialist will review it. You will be notified of any new findings that may affect your care.  When to seek medical advice  Call your healthcare provider right away if any of these occur:  · Mild wheezing or difficulty breathing  · Fever of 100.4ºF (38ºC) or higher, or as directed by your healthcare provider  · Unexpected weight loss      Treating Insomnia     Learning to relax before bedtime can improve your sleep.     Good sleeping habits are a key part of treatment. If needed, some medications may help you sleep better at first. Making healthy lifestyle changes and learning to relax can improve your sleep. Treating insomnia takes commitment, but trust that your efforts will pay off. Talk to your health care provider before taking any medication.  Healthy lifestyle  Your lifestyle affects  your health and your sleep. Here are some healthy habits:  · Keep a regular sleep schedule. Go to bed and get up at the same time each day.  · Exercise regularly. It may help you reduce stress. Avoid strenuous exercise for 2 to 4 hours before bedtime.  · Avoid or limit naps, especially in the late afternoon.  · Use your bed only for sleep and sex.  · Dont spend too much time in bed trying to fall asleep. If you cant fall asleep, get up and do something (no electronics) until you become tired and drowsy.  · Avoid or limit caffeine and nicotine for up to 6 hours before bedtime. They can keep you awake at night.   · Also avoid alcohol for at least 4 to 6 hours before bedtime. It may help you fall asleep at first, but you will have more awakenings throughout the night, and your sleep will not be restful.  Before bedtime  To sleep better every night, try these tips:  · Have a bedtime routine to let your body and mind know when its time to sleep.  · Think of going to bed as relaxing and enjoyable. Sleep will come sooner.  · If your worries dont let you sleep, write them down in a diary. Then close it, and go to bed.  · Make sure the room is not too hot or too cold. If its not dark enough, an eye mask can help. If its noisy, try using earplugs.  Learn to relax  Stress, anxiety, and body tension may keep you awake at night. To unwind before bedtime, try a warm bath, meditation, or yoga. Also, try the following:  · Deep breathing. Sit or lie back in a chair. Take a slow, deep breath. Hold it for 5 counts. Then breathe out slowly through your mouth. Keep doing this until you feel relaxed.  · Progressive muscle relaxation. Tense and then relax the muscles in your body as you breathe deeply. Start with your feet and work up your body to your neck and face.  Date Last Reviewed: 7/18/2015  © 3028-5986 [employer]. [address] [place] [address], [place] [address]. All rights reserved. This information is not intended  as a substitute for professional medical care. Always follow your healthcare professional's instructions.        · Coughing up large amounts of colored sputum  · Night sweats (sheets and pajamas get soaking wet)  Call 911, or get immediate medical care  Contact emergency services right away if any of these occur:  · Coughing up blood  · Moderate to severe trouble breathing or wheezing  Date Last Reviewed: 9/13/2015  © 3769-6[address] [employer]. [address] [place] [address], [place] [address]. All rights reserved. This information is not intended as a substitute for professional medical care. Always follow your healthcare professional's instructions.

## 2020-11-09 ENCOUNTER — PATIENT MESSAGE (OUTPATIENT)
Dept: UROLOGY | Facility: CLINIC | Age: 60
End: 2020-11-09

## 2020-11-09 DIAGNOSIS — N40.1 BENIGN NON-NODULAR PROSTATIC HYPERPLASIA WITH LOWER URINARY TRACT SYMPTOMS: Primary | ICD-10-CM

## 2020-11-10 ENCOUNTER — PATIENT MESSAGE (OUTPATIENT)
Dept: UROLOGY | Facility: CLINIC | Age: 60
End: 2020-11-10

## 2020-12-01 ENCOUNTER — OFFICE VISIT (OUTPATIENT)
Dept: FAMILY MEDICINE | Facility: CLINIC | Age: 60
End: 2020-12-01
Payer: COMMERCIAL

## 2020-12-01 VITALS
DIASTOLIC BLOOD PRESSURE: 86 MMHG | BODY MASS INDEX: 29.15 KG/M2 | HEIGHT: 71 IN | SYSTOLIC BLOOD PRESSURE: 136 MMHG | TEMPERATURE: 98 F | RESPIRATION RATE: 18 BRPM | WEIGHT: 208.25 LBS | HEART RATE: 65 BPM | OXYGEN SATURATION: 99 %

## 2020-12-01 DIAGNOSIS — Z13.1 DIABETES MELLITUS SCREENING: ICD-10-CM

## 2020-12-01 DIAGNOSIS — N40.1 BENIGN NON-NODULAR PROSTATIC HYPERPLASIA WITH LOWER URINARY TRACT SYMPTOMS: ICD-10-CM

## 2020-12-01 DIAGNOSIS — R53.83 FATIGUE, UNSPECIFIED TYPE: ICD-10-CM

## 2020-12-01 DIAGNOSIS — I10 ESSENTIAL HYPERTENSION: Primary | ICD-10-CM

## 2020-12-01 DIAGNOSIS — Z00.00 ENCOUNTER FOR BLOOD TEST FOR ROUTINE GENERAL PHYSICAL EXAMINATION: ICD-10-CM

## 2020-12-01 DIAGNOSIS — Z13.29 THYROID DISORDER SCREEN: ICD-10-CM

## 2020-12-01 DIAGNOSIS — R73.01 IFG (IMPAIRED FASTING GLUCOSE): ICD-10-CM

## 2020-12-01 DIAGNOSIS — Z13.0 SCREENING FOR DEFICIENCY ANEMIA: ICD-10-CM

## 2020-12-01 DIAGNOSIS — C61 PROSTATE CANCER: ICD-10-CM

## 2020-12-01 DIAGNOSIS — Z23 FLU VACCINE NEED: ICD-10-CM

## 2020-12-01 DIAGNOSIS — E78.2 MIXED HYPERLIPIDEMIA: ICD-10-CM

## 2020-12-01 PROCEDURE — 3075F PR MOST RECENT SYSTOLIC BLOOD PRESS GE 130-139MM HG: ICD-10-PCS | Mod: CPTII,S$GLB,, | Performed by: NURSE PRACTITIONER

## 2020-12-01 PROCEDURE — 99999 PR PBB SHADOW E&M-EST. PATIENT-LVL IV: ICD-10-PCS | Mod: PBBFAC,,, | Performed by: NURSE PRACTITIONER

## 2020-12-01 PROCEDURE — 3079F PR MOST RECENT DIASTOLIC BLOOD PRESSURE 80-89 MM HG: ICD-10-PCS | Mod: CPTII,S$GLB,, | Performed by: NURSE PRACTITIONER

## 2020-12-01 PROCEDURE — 99214 OFFICE O/P EST MOD 30 MIN: CPT | Mod: 25,S$GLB,, | Performed by: NURSE PRACTITIONER

## 2020-12-01 PROCEDURE — 90686 FLU VACCINE (QUAD) GREATER THAN OR EQUAL TO 3YO PRESERVATIVE FREE IM: ICD-10-PCS | Mod: S$GLB,,, | Performed by: NURSE PRACTITIONER

## 2020-12-01 PROCEDURE — 90471 FLU VACCINE (QUAD) GREATER THAN OR EQUAL TO 3YO PRESERVATIVE FREE IM: ICD-10-PCS | Mod: S$GLB,,, | Performed by: NURSE PRACTITIONER

## 2020-12-01 PROCEDURE — 99214 PR OFFICE/OUTPT VISIT, EST, LEVL IV, 30-39 MIN: ICD-10-PCS | Mod: 25,S$GLB,, | Performed by: NURSE PRACTITIONER

## 2020-12-01 PROCEDURE — 99999 PR PBB SHADOW E&M-EST. PATIENT-LVL IV: CPT | Mod: PBBFAC,,, | Performed by: NURSE PRACTITIONER

## 2020-12-01 PROCEDURE — 3079F DIAST BP 80-89 MM HG: CPT | Mod: CPTII,S$GLB,, | Performed by: NURSE PRACTITIONER

## 2020-12-01 PROCEDURE — 3008F BODY MASS INDEX DOCD: CPT | Mod: CPTII,S$GLB,, | Performed by: NURSE PRACTITIONER

## 2020-12-01 PROCEDURE — 1126F PR PAIN SEVERITY QUANTIFIED, NO PAIN PRESENT: ICD-10-PCS | Mod: S$GLB,,, | Performed by: NURSE PRACTITIONER

## 2020-12-01 PROCEDURE — 1126F AMNT PAIN NOTED NONE PRSNT: CPT | Mod: S$GLB,,, | Performed by: NURSE PRACTITIONER

## 2020-12-01 PROCEDURE — 90471 IMMUNIZATION ADMIN: CPT | Mod: S$GLB,,, | Performed by: NURSE PRACTITIONER

## 2020-12-01 PROCEDURE — 3075F SYST BP GE 130 - 139MM HG: CPT | Mod: CPTII,S$GLB,, | Performed by: NURSE PRACTITIONER

## 2020-12-01 PROCEDURE — 3008F PR BODY MASS INDEX (BMI) DOCUMENTED: ICD-10-PCS | Mod: CPTII,S$GLB,, | Performed by: NURSE PRACTITIONER

## 2020-12-01 PROCEDURE — 90686 IIV4 VACC NO PRSV 0.5 ML IM: CPT | Mod: S$GLB,,, | Performed by: NURSE PRACTITIONER

## 2020-12-01 RX ORDER — VIT C/E/ZN/COPPR/LUTEIN/ZEAXAN 250MG-90MG
1000 CAPSULE ORAL DAILY
Qty: 90 CAPSULE | Refills: 3 | Status: SHIPPED | OUTPATIENT
Start: 2020-12-01 | End: 2022-06-13 | Stop reason: SDUPTHER

## 2020-12-01 RX ORDER — LANOLIN ALCOHOL/MO/W.PET/CERES
1000 CREAM (GRAM) TOPICAL DAILY
Qty: 90 TABLET | Refills: 3 | Status: SHIPPED | OUTPATIENT
Start: 2020-12-01 | End: 2022-06-13

## 2020-12-02 NOTE — PROGRESS NOTES
"Subjective:       Patient ID: Diallo Dawkins is a 60 y.o. male.    Chief Complaint: Follow-up (6 months F/U), Medication Refill, and Flu Vaccine    HPI    Patient is a 60 year old white male with Hypertension, IFG, Prostate Cancer with BPH followed by Dr. Arana, mild anemia,  and chronic GERD followed by Dr. Montemayor that is here today for 6 month follow up with fasting lab results..     Patient has Hypertension and takes Valsartan 160 mg daily. Blood pressure is controlled.  /86   Pulse 65   Temp 97.6 °F (36.4 °C) (Temporal)   Resp 18   Ht 5' 11" (1.803 m)   Wt 94.4 kg (208 lb 3.6 oz)   SpO2 99%   BMI 29.04 kg/m²      Patient has Prostate Cancer that was diagnosed around May 2018 when had prostate biopsy done but has not required any treatment.  Patient has BPH  that is being followed by Dr. Arana.  He is overdue for office visit - advised to stop by office today to schedule appointment.     Patient had a mild anemia on wellness labs in April 2018 and had complained of fatigue.  Before knowing patient had a history of pernicious anemia, I had labs done for workup.  Family later contacted me to report that patient had history of pernicious anemia previously diagnosed.  The CBC shows stable labs since 2016.  His iron and Ferritin levels were normal.  His Vitamin D level was normal but on low end of normal at 35.  His Vitamin B12 level was also normal at 386 but on the lower end of normal - recommended daily supplementation at April 2018 visit. His anemia was mostly resolved with June 2020 labs.  Advised patient to stay on daily vitamin D and B12 supplements.     Patient has an  with HgbA1C of 5.1% at May 2018 visit - had advised on lifestyle modifications.  TODAY, IFG is now 117.  Again recommended stricter lifestyle modifications and will recheck in 6 months.     Patient has Hyperlipidemia and takes Rosuvastatin 5 mg daily.  LDL 67.2.    Component      Latest Ref Rng & Units 11/24/2020 " 5/30/2020 11/20/2019 5/23/2019                Sodium      136 - 145 mmol/L 144 141 143 144   Potassium      3.5 - 5.1 mmol/L 5.0 4.7 3.8 4.7   Chloride      95 - 110 mmol/L 108 105 108 106   CO2      23 - 29 mmol/L 30 (H) 28 27 26   Glucose      70 - 110 mg/dL 117 (H) 104 109 126 (H)   BUN      2 - 20 mg/dL 18 23 (H) 20 21 (H)   Creatinine      0.50 - 1.40 mg/dL 1.04 0.85 0.93 0.94   Calcium      8.7 - 10.5 mg/dL 9.4 9.4 8.8 9.5   PROTEIN TOTAL      6.0 - 8.4 g/dL 7.0 7.4 7.1 7.7   Albumin      3.5 - 5.2 g/dL 4.2 4.3 4.2 4.5   BILIRUBIN TOTAL      0.1 - 1.0 mg/dL 0.6 0.7 0.6 0.5   Alkaline Phosphatase      38 - 126 U/L 48 46 53 51   AST      15 - 46 U/L 32 35 29 34   ALT      10 - 44 U/L 27 27 27 35   Anion Gap      8 - 16 mmol/L 6 (L) 8 8 12   eGFR if African American      >60 mL/min/1.73 m:2 >60.0 >60.0 >60.0 >60.0   eGFR if non African American      >60 mL/min/1.73 m:2 >60.0 >60.0 >60.0 >60.0   Cholesterol      120 - 199 mg/dL 121 162 124 185   Triglycerides      30 - 150 mg/dL 119 142 93 165 (H)   HDL      40 - 75 mg/dL 30 (L) 33 (L) 38 (L) 37 (L)   LDL Cholesterol External      63.0 - 159.0 mg/dL 67.2 100.6 67.4 115.0   HDL/Cholesterol Ratio      20.0 - 50.0 % 24.8 20.4 30.6 20.0   Total Cholesterol/HDL Ratio      2.0 - 5.0 4.0 4.9 3.3 5.0   Non-HDL Cholesterol      mg/dL 91 129 86 148       Current Outpatient Medications   Medication Sig Dispense Refill    cholecalciferol, vitamin D3, (VITAMIN D3) 25 mcg (1,000 unit) capsule Take 1 capsule (1,000 Units total) by mouth once daily. 90 capsule 3    cyanocobalamin (VITAMIN B-12) 1000 MCG tablet Take 1 tablet (1,000 mcg total) by mouth once daily. 90 tablet 3    rosuvastatin (CRESTOR) 5 MG tablet TAKE 1 TABLET DAILY 90 tablet 4    valsartan (DIOVAN) 160 MG tablet Take 1 tablet (160 mg total) by mouth once daily. 90 tablet 1     No current facility-administered medications for this visit.        Past Medical History:   Diagnosis Date    Benign non-nodular  prostatic hyperplasia with lower urinary tract symptoms 07/19/2016    Followed by Dr. Arana    Cervical spondylosis 2017    orthopedic - Dr. Galaviz    COVID-19 virus detected 10/18/2020    [facility].    Elevated PSA, less than 10 ng/ml 5/9/2018    Essential hypertension 3/9/2018    GERD with esophagitis     EGD done 12/11/2018 with Dr. Mynor Montemayor    Pernicious anemia     per patient report    Prostate cancer 8/31/2018    prostate biopsy 5/14/2018 - no treatment required - being followed by [facility] Dr. Arana.    Urinary tract infection        Past Surgical History:   Procedure Laterality Date    COLONOSCOPY  05/24/2018    [facility] Dr. Mynor Montemayor--Polyps (6 mm) in ascending colon and hepatic flexure, Angioectasia in ascending colon, Internal Hemorrhoids.   Colonoscopy in 5 years     OTHER SURGICAL HISTORY      angiogram on wrist    PROSTATE BIOPSY  05/14/2018    done by Dr. Arana.    UPPER GASTROINTESTINAL ENDOSCOPY  12/11/2018    Dr. Montemayor - [facility] - + 7mm polyp in the cardia biopsied - hyperplastic polyp.  GERD with mild chronic esophagitis present.       Family History   Problem Relation Age of Onset    Cancer Paternal Grandmother     No Known Problems Mother     Heart disease Father         passed age73    Hypertension Father     Hyperlipidemia Father     No Known Problems Sister     No Known Problems Sister     No Known Problems Sister     Kidney disease Neg Hx     Prostate cancer Neg Hx        Social History     Socioeconomic History    Marital status: Single     Spouse name: Not on file    Number of children: Not on file    Years of education: Not on file    Highest education level: Not on file   Occupational History    Occupation: sales   Social Needs    Financial resource strain: Not on file    Food insecurity     Worry: Not on file     Inability: Not on file    Transportation needs     Medical: Not on file     Non-medical: Not on  file   Tobacco Use    Smoking status: Never Smoker    Smokeless tobacco: Never Used   Substance and Sexual Activity    Alcohol use: Yes     Comment: twice a month - 2 drinks per occasion    Drug use: No    Sexual activity: Yes     Partners: Female   Lifestyle    Physical activity     Days per week: Not on file     Minutes per session: Not on file    Stress: Not on file   Relationships    Social connections     Talks on phone: Not on file     Gets together: Not on file     Attends Church service: Not on file     Active member of club or organization: Not on file     Attends meetings of clubs or organizations: Not on file     Relationship status: Not on file   Other Topics Concern    Not on file   Social History Narrative    Not on file       Review of Systems   Constitutional: Negative for activity change, appetite change, fatigue, fever and unexpected weight change.   HENT: Negative for congestion, ear pain, mouth sores, nosebleeds, postnasal drip, rhinorrhea, sinus pressure, sneezing, sore throat, trouble swallowing and voice change.    Eyes: Negative.    Respiratory: Negative for cough, chest tightness and shortness of breath.    Cardiovascular: Negative for chest pain, palpitations and leg swelling.   Gastrointestinal: Negative.  Negative for abdominal pain, blood in stool, constipation, diarrhea, nausea and vomiting.   Endocrine: Negative.    Genitourinary: Negative for difficulty urinating, dysuria, flank pain, hematuria and urgency.   Musculoskeletal: Negative for arthralgias, back pain, gait problem, joint swelling, myalgias and neck pain.   Skin: Negative for color change, rash and wound.   Allergic/Immunologic: Negative for immunocompromised state.   Neurological: Negative for dizziness, tremors, seizures, syncope, speech difficulty and headaches.   Hematological: Negative for adenopathy. Does not bruise/bleed easily.   Psychiatric/Behavioral: Negative for behavioral problems, dysphoric mood,  "sleep disturbance and suicidal ideas. The patient is not nervous/anxious.          Objective:     Vitals:    12/01/20 1029 12/01/20 1056   BP: (!) 134/90 136/86   BP Location: Right arm    Patient Position: Sitting    BP Method: Large (Manual)    Pulse: 65    Resp: 18    Temp: 97.6 °F (36.4 °C)    TempSrc: Temporal    SpO2: 99%    Weight: 94.4 kg (208 lb 3.6 oz)    Height: 5' 11" (1.803 m)           Physical Exam  Constitutional:       General: He is not in acute distress.     Appearance: He is well-developed. He is not ill-appearing, toxic-appearing or diaphoretic.      Comments: Body mass index is 29.04 kg/m².       HENT:      Head: Normocephalic and atraumatic.      Right Ear: External ear normal.      Left Ear: External ear normal.      Nose: Nose normal.      Mouth/Throat:      Pharynx: No oropharyngeal exudate.   Eyes:      General: No scleral icterus.        Right eye: No discharge.         Left eye: No discharge.      Pupils: Pupils are equal, round, and reactive to light.   Neck:      Musculoskeletal: Normal range of motion and neck supple.      Thyroid: No thyromegaly.      Trachea: No tracheal deviation.   Cardiovascular:      Rate and Rhythm: Normal rate and regular rhythm.      Heart sounds: Normal heart sounds. No murmur.   Pulmonary:      Effort: Pulmonary effort is normal. No respiratory distress.      Breath sounds: Normal breath sounds.   Abdominal:      General: There is no distension.      Palpations: Abdomen is soft.   Genitourinary:     Comments: Deferred to urologist  Musculoskeletal: Normal range of motion.         General: No swelling or deformity.      Right lower leg: No edema.      Left lower leg: No edema.   Lymphadenopathy:      Cervical: No cervical adenopathy.   Skin:     General: Skin is warm and dry.      Findings: No rash.   Neurological:      Mental Status: He is alert and oriented to person, place, and time.      Coordination: Coordination normal.   Psychiatric:         Mood and " Affect: Mood normal.         Behavior: Behavior normal.         Thought Content: Thought content normal.         Judgment: Judgment normal.           Assessment:         ICD-10-CM ICD-9-CM   1. Essential hypertension  I10 401.9   2. Mixed hyperlipidemia  E78.2 272.2   3. Prostate cancer  C61 185   4. Benign non-nodular prostatic hyperplasia with lower urinary tract symptoms  N40.1 600.91   5. IFG (impaired fasting glucose)  R73.01 790.21   6. Fatigue, unspecified type  R53.83 780.79   7. Flu vaccine need  Z23 V04.81   8. Encounter for blood test for routine general physical examination  Z00.00 V72.62   9. Screening for deficiency anemia  Z13.0 V78.1   10. Thyroid disorder screen  Z13.29 V77.0   11. Diabetes mellitus screening  Z13.1 V77.1       Plan:       Essential hypertension  Continue current medication(s).  Follow up in 6 months.    Mixed hyperlipidemia  Continue current medication(s).  Follow up in 6 months.  -     Lipid Panel; Future; Expected date: 12/01/2020    Prostate cancer  -  Make follow up appointment with Dr. Arana.    Benign non-nodular prostatic hyperplasia with lower urinary tract symptoms    IFG (impaired fasting glucose)  -  Stricter lifestyle modifications.    Fatigue, unspecified type  -  Continue vitamin supplements.  -     cyanocobalamin (VITAMIN B-12) 1000 MCG tablet; Take 1 tablet (1,000 mcg total) by mouth once daily.  Dispense: 90 tablet; Refill: 3  -     cholecalciferol, vitamin D3, (VITAMIN D3) 25 mcg (1,000 unit) capsule; Take 1 capsule (1,000 Units total) by mouth once daily.  Dispense: 90 capsule; Refill: 3    Flu vaccine need  -     Influenza - Quadrivalent *Preferred* (6 months+) (PF)    Encounter for blood test for routine general physical examination  -     CBC Auto Differential; Future; Expected date: 12/01/2020  -     Comprehensive Metabolic Panel; Future; Expected date: 12/01/2020  -     Hemoglobin A1C; Future; Expected date: 12/01/2020  -     Lipid Panel; Future; Expected  date: 12/01/2020  -     TSH; Future; Expected date: 12/01/2020    Screening for deficiency anemia  -     CBC Auto Differential; Future; Expected date: 12/01/2020    Thyroid disorder screen  -     TSH; Future; Expected date: 12/01/2020    Diabetes mellitus screening  -     Comprehensive Metabolic Panel; Future; Expected date: 12/01/2020  -     Hemoglobin A1C; Future; Expected date: 12/01/2020      Follow up in about 6 months (around 6/1/2021) for fasting labs and WELLNESS EXAM.     Patient's Medications   New Prescriptions    No medications on file   Previous Medications    ROSUVASTATIN (CRESTOR) 5 MG TABLET    TAKE 1 TABLET DAILY    VALSARTAN (DIOVAN) 160 MG TABLET    Take 1 tablet (160 mg total) by mouth once daily.   Modified Medications    Modified Medication Previous Medication    CHOLECALCIFEROL, VITAMIN D3, (VITAMIN D3) 25 MCG (1,000 UNIT) CAPSULE cholecalciferol, vitamin D3, (VITAMIN D3) 25 mcg (1,000 unit) capsule       Take 1 capsule (1,000 Units total) by mouth once daily.    Take 1 capsule (1,000 Units total) by mouth once daily.    CYANOCOBALAMIN (VITAMIN B-12) 1000 MCG TABLET cyanocobalamin (VITAMIN B-12) 1000 MCG tablet       Take 1 tablet (1,000 mcg total) by mouth once daily.    Take 1 tablet (1,000 mcg total) by mouth once daily.   Discontinued Medications    OMEPRAZOLE 20 MG TBEC    Take 1 tablet by mouth once daily.

## 2021-06-02 ENCOUNTER — OFFICE VISIT (OUTPATIENT)
Dept: FAMILY MEDICINE | Facility: CLINIC | Age: 61
End: 2021-06-02
Payer: COMMERCIAL

## 2021-06-02 VITALS
HEIGHT: 71 IN | RESPIRATION RATE: 16 BRPM | OXYGEN SATURATION: 98 % | DIASTOLIC BLOOD PRESSURE: 94 MMHG | WEIGHT: 206.38 LBS | HEART RATE: 63 BPM | BODY MASS INDEX: 28.89 KG/M2 | SYSTOLIC BLOOD PRESSURE: 146 MMHG | TEMPERATURE: 98 F

## 2021-06-02 DIAGNOSIS — D51.0 PERNICIOUS ANEMIA: ICD-10-CM

## 2021-06-02 DIAGNOSIS — R73.01 IFG (IMPAIRED FASTING GLUCOSE): ICD-10-CM

## 2021-06-02 DIAGNOSIS — I10 ESSENTIAL HYPERTENSION: ICD-10-CM

## 2021-06-02 DIAGNOSIS — N40.1 BENIGN NON-NODULAR PROSTATIC HYPERPLASIA WITH LOWER URINARY TRACT SYMPTOMS: ICD-10-CM

## 2021-06-02 DIAGNOSIS — E78.2 MIXED HYPERLIPIDEMIA: ICD-10-CM

## 2021-06-02 DIAGNOSIS — C61 PROSTATE CANCER: ICD-10-CM

## 2021-06-02 DIAGNOSIS — D53.9 MACROCYTIC ANEMIA: ICD-10-CM

## 2021-06-02 DIAGNOSIS — Z00.00 ANNUAL PHYSICAL EXAM: Primary | ICD-10-CM

## 2021-06-02 PROCEDURE — 99396 PREV VISIT EST AGE 40-64: CPT | Mod: S$GLB,,, | Performed by: NURSE PRACTITIONER

## 2021-06-02 PROCEDURE — 99999 PR PBB SHADOW E&M-EST. PATIENT-LVL IV: ICD-10-PCS | Mod: PBBFAC,,, | Performed by: NURSE PRACTITIONER

## 2021-06-02 PROCEDURE — 3008F BODY MASS INDEX DOCD: CPT | Mod: CPTII,S$GLB,, | Performed by: NURSE PRACTITIONER

## 2021-06-02 PROCEDURE — 3008F PR BODY MASS INDEX (BMI) DOCUMENTED: ICD-10-PCS | Mod: CPTII,S$GLB,, | Performed by: NURSE PRACTITIONER

## 2021-06-02 PROCEDURE — 1126F AMNT PAIN NOTED NONE PRSNT: CPT | Mod: S$GLB,,, | Performed by: NURSE PRACTITIONER

## 2021-06-02 PROCEDURE — 1126F PR PAIN SEVERITY QUANTIFIED, NO PAIN PRESENT: ICD-10-PCS | Mod: S$GLB,,, | Performed by: NURSE PRACTITIONER

## 2021-06-02 PROCEDURE — 99999 PR PBB SHADOW E&M-EST. PATIENT-LVL IV: CPT | Mod: PBBFAC,,, | Performed by: NURSE PRACTITIONER

## 2021-06-02 PROCEDURE — 99396 PR PREVENTIVE VISIT,EST,40-64: ICD-10-PCS | Mod: S$GLB,,, | Performed by: NURSE PRACTITIONER

## 2021-06-02 RX ORDER — ROSUVASTATIN CALCIUM 5 MG/1
5 TABLET, COATED ORAL DAILY
Qty: 90 TABLET | Refills: 1 | Status: SHIPPED | OUTPATIENT
Start: 2021-06-02 | End: 2021-11-29

## 2021-06-02 RX ORDER — VALSARTAN 160 MG/1
160 TABLET ORAL DAILY
Qty: 90 TABLET | Refills: 1 | Status: CANCELLED | OUTPATIENT
Start: 2021-06-02

## 2021-06-02 RX ORDER — VALSARTAN 320 MG/1
320 TABLET ORAL DAILY
Qty: 30 TABLET | Refills: 0 | Status: SHIPPED | OUTPATIENT
Start: 2021-06-02 | End: 2021-06-24 | Stop reason: ALTCHOICE

## 2021-06-24 ENCOUNTER — OFFICE VISIT (OUTPATIENT)
Dept: FAMILY MEDICINE | Facility: CLINIC | Age: 61
End: 2021-06-24
Payer: COMMERCIAL

## 2021-06-24 VITALS
TEMPERATURE: 98 F | OXYGEN SATURATION: 98 % | BODY MASS INDEX: 27.98 KG/M2 | SYSTOLIC BLOOD PRESSURE: 140 MMHG | DIASTOLIC BLOOD PRESSURE: 94 MMHG | WEIGHT: 206.56 LBS | HEIGHT: 72 IN | HEART RATE: 74 BPM

## 2021-06-24 DIAGNOSIS — I10 ESSENTIAL HYPERTENSION: Primary | ICD-10-CM

## 2021-06-24 PROCEDURE — 1126F PR PAIN SEVERITY QUANTIFIED, NO PAIN PRESENT: ICD-10-PCS | Mod: S$GLB,,, | Performed by: NURSE PRACTITIONER

## 2021-06-24 PROCEDURE — 99214 OFFICE O/P EST MOD 30 MIN: CPT | Mod: S$GLB,,, | Performed by: NURSE PRACTITIONER

## 2021-06-24 PROCEDURE — 3008F BODY MASS INDEX DOCD: CPT | Mod: CPTII,S$GLB,, | Performed by: NURSE PRACTITIONER

## 2021-06-24 PROCEDURE — 3008F PR BODY MASS INDEX (BMI) DOCUMENTED: ICD-10-PCS | Mod: CPTII,S$GLB,, | Performed by: NURSE PRACTITIONER

## 2021-06-24 PROCEDURE — 1126F AMNT PAIN NOTED NONE PRSNT: CPT | Mod: S$GLB,,, | Performed by: NURSE PRACTITIONER

## 2021-06-24 PROCEDURE — 3080F PR MOST RECENT DIASTOLIC BLOOD PRESSURE >= 90 MM HG: ICD-10-PCS | Mod: CPTII,S$GLB,, | Performed by: NURSE PRACTITIONER

## 2021-06-24 PROCEDURE — 99214 PR OFFICE/OUTPT VISIT, EST, LEVL IV, 30-39 MIN: ICD-10-PCS | Mod: S$GLB,,, | Performed by: NURSE PRACTITIONER

## 2021-06-24 PROCEDURE — 3077F PR MOST RECENT SYSTOLIC BLOOD PRESSURE >= 140 MM HG: ICD-10-PCS | Mod: CPTII,S$GLB,, | Performed by: NURSE PRACTITIONER

## 2021-06-24 PROCEDURE — 99999 PR PBB SHADOW E&M-EST. PATIENT-LVL IV: ICD-10-PCS | Mod: PBBFAC,,, | Performed by: NURSE PRACTITIONER

## 2021-06-24 PROCEDURE — 3077F SYST BP >= 140 MM HG: CPT | Mod: CPTII,S$GLB,, | Performed by: NURSE PRACTITIONER

## 2021-06-24 PROCEDURE — 99999 PR PBB SHADOW E&M-EST. PATIENT-LVL IV: CPT | Mod: PBBFAC,,, | Performed by: NURSE PRACTITIONER

## 2021-06-24 PROCEDURE — 3080F DIAST BP >= 90 MM HG: CPT | Mod: CPTII,S$GLB,, | Performed by: NURSE PRACTITIONER

## 2021-06-24 RX ORDER — AMLODIPINE AND VALSARTAN 5; 320 MG/1; MG/1
1 TABLET ORAL DAILY
Qty: 30 TABLET | Refills: 0 | Status: SHIPPED | OUTPATIENT
Start: 2021-06-24 | End: 2021-07-20

## 2021-07-20 ENCOUNTER — PATIENT MESSAGE (OUTPATIENT)
Dept: FAMILY MEDICINE | Facility: CLINIC | Age: 61
End: 2021-07-20

## 2021-07-20 DIAGNOSIS — I10 ESSENTIAL HYPERTENSION: ICD-10-CM

## 2021-07-20 RX ORDER — AMLODIPINE AND VALSARTAN 5; 320 MG/1; MG/1
TABLET ORAL
Qty: 30 TABLET | Refills: 0 | Status: SHIPPED | OUTPATIENT
Start: 2021-07-20 | End: 2021-07-29 | Stop reason: SDUPTHER

## 2021-07-29 ENCOUNTER — OFFICE VISIT (OUTPATIENT)
Dept: FAMILY MEDICINE | Facility: CLINIC | Age: 61
End: 2021-07-29
Payer: COMMERCIAL

## 2021-07-29 VITALS
HEIGHT: 72 IN | WEIGHT: 202.69 LBS | DIASTOLIC BLOOD PRESSURE: 84 MMHG | BODY MASS INDEX: 27.45 KG/M2 | TEMPERATURE: 98 F | OXYGEN SATURATION: 98 % | SYSTOLIC BLOOD PRESSURE: 126 MMHG | HEART RATE: 90 BPM | RESPIRATION RATE: 16 BRPM

## 2021-07-29 DIAGNOSIS — R05.9 COUGH: ICD-10-CM

## 2021-07-29 DIAGNOSIS — N40.1 BENIGN NON-NODULAR PROSTATIC HYPERPLASIA WITH LOWER URINARY TRACT SYMPTOMS: ICD-10-CM

## 2021-07-29 DIAGNOSIS — J06.9 VIRAL URI WITH COUGH: ICD-10-CM

## 2021-07-29 DIAGNOSIS — I10 ESSENTIAL HYPERTENSION: Primary | ICD-10-CM

## 2021-07-29 DIAGNOSIS — R73.01 IFG (IMPAIRED FASTING GLUCOSE): ICD-10-CM

## 2021-07-29 DIAGNOSIS — C61 PROSTATE CANCER: ICD-10-CM

## 2021-07-29 DIAGNOSIS — D53.9 MACROCYTIC ANEMIA: ICD-10-CM

## 2021-07-29 DIAGNOSIS — E78.2 MIXED HYPERLIPIDEMIA: ICD-10-CM

## 2021-07-29 PROBLEM — K21.9 CHRONIC GERD: Status: RESOLVED | Noted: 2020-03-10 | Resolved: 2021-07-29

## 2021-07-29 PROCEDURE — U0005 INFEC AGEN DETEC AMPLI PROBE: HCPCS | Performed by: NURSE PRACTITIONER

## 2021-07-29 PROCEDURE — 1126F AMNT PAIN NOTED NONE PRSNT: CPT | Mod: CPTII,S$GLB,, | Performed by: NURSE PRACTITIONER

## 2021-07-29 PROCEDURE — 99214 OFFICE O/P EST MOD 30 MIN: CPT | Mod: S$GLB,,, | Performed by: NURSE PRACTITIONER

## 2021-07-29 PROCEDURE — 3079F DIAST BP 80-89 MM HG: CPT | Mod: CPTII,S$GLB,, | Performed by: NURSE PRACTITIONER

## 2021-07-29 PROCEDURE — 3079F PR MOST RECENT DIASTOLIC BLOOD PRESSURE 80-89 MM HG: ICD-10-PCS | Mod: CPTII,S$GLB,, | Performed by: NURSE PRACTITIONER

## 2021-07-29 PROCEDURE — 3044F HG A1C LEVEL LT 7.0%: CPT | Mod: CPTII,S$GLB,, | Performed by: NURSE PRACTITIONER

## 2021-07-29 PROCEDURE — 3074F SYST BP LT 130 MM HG: CPT | Mod: CPTII,S$GLB,, | Performed by: NURSE PRACTITIONER

## 2021-07-29 PROCEDURE — 99999 PR PBB SHADOW E&M-EST. PATIENT-LVL III: ICD-10-PCS | Mod: PBBFAC,,, | Performed by: NURSE PRACTITIONER

## 2021-07-29 PROCEDURE — 3044F PR MOST RECENT HEMOGLOBIN A1C LEVEL <7.0%: ICD-10-PCS | Mod: CPTII,S$GLB,, | Performed by: NURSE PRACTITIONER

## 2021-07-29 PROCEDURE — 1160F PR REVIEW ALL MEDS BY PRESCRIBER/CLIN PHARMACIST DOCUMENTED: ICD-10-PCS | Mod: CPTII,S$GLB,, | Performed by: NURSE PRACTITIONER

## 2021-07-29 PROCEDURE — 99214 PR OFFICE/OUTPT VISIT, EST, LEVL IV, 30-39 MIN: ICD-10-PCS | Mod: S$GLB,,, | Performed by: NURSE PRACTITIONER

## 2021-07-29 PROCEDURE — 3008F PR BODY MASS INDEX (BMI) DOCUMENTED: ICD-10-PCS | Mod: CPTII,S$GLB,, | Performed by: NURSE PRACTITIONER

## 2021-07-29 PROCEDURE — 1159F MED LIST DOCD IN RCRD: CPT | Mod: CPTII,S$GLB,, | Performed by: NURSE PRACTITIONER

## 2021-07-29 PROCEDURE — 3008F BODY MASS INDEX DOCD: CPT | Mod: CPTII,S$GLB,, | Performed by: NURSE PRACTITIONER

## 2021-07-29 PROCEDURE — 1160F RVW MEDS BY RX/DR IN RCRD: CPT | Mod: CPTII,S$GLB,, | Performed by: NURSE PRACTITIONER

## 2021-07-29 PROCEDURE — U0003 INFECTIOUS AGENT DETECTION BY NUCLEIC ACID (DNA OR RNA); SEVERE ACUTE RESPIRATORY SYNDROME CORONAVIRUS 2 (SARS-COV-2) (CORONAVIRUS DISEASE [COVID-19]), AMPLIFIED PROBE TECHNIQUE, MAKING USE OF HIGH THROUGHPUT TECHNOLOGIES AS DESCRIBED BY CMS-2020-01-R: HCPCS | Performed by: NURSE PRACTITIONER

## 2021-07-29 PROCEDURE — 3074F PR MOST RECENT SYSTOLIC BLOOD PRESSURE < 130 MM HG: ICD-10-PCS | Mod: CPTII,S$GLB,, | Performed by: NURSE PRACTITIONER

## 2021-07-29 PROCEDURE — 1159F PR MEDICATION LIST DOCUMENTED IN MEDICAL RECORD: ICD-10-PCS | Mod: CPTII,S$GLB,, | Performed by: NURSE PRACTITIONER

## 2021-07-29 PROCEDURE — 99999 PR PBB SHADOW E&M-EST. PATIENT-LVL III: CPT | Mod: PBBFAC,,, | Performed by: NURSE PRACTITIONER

## 2021-07-29 PROCEDURE — 1126F PR PAIN SEVERITY QUANTIFIED, NO PAIN PRESENT: ICD-10-PCS | Mod: CPTII,S$GLB,, | Performed by: NURSE PRACTITIONER

## 2021-07-29 RX ORDER — AMLODIPINE AND VALSARTAN 5; 320 MG/1; MG/1
1 TABLET ORAL DAILY
Qty: 90 TABLET | Refills: 1 | Status: SHIPPED | OUTPATIENT
Start: 2021-07-29 | End: 2021-12-15 | Stop reason: SDUPTHER

## 2021-07-30 LAB
SARS-COV-2 RNA RESP QL NAA+PROBE: NOT DETECTED
SARS-COV-2- CYCLE NUMBER: -1

## 2021-10-01 ENCOUNTER — PATIENT MESSAGE (OUTPATIENT)
Dept: UROLOGY | Facility: CLINIC | Age: 61
End: 2021-10-01

## 2021-10-04 DIAGNOSIS — N40.1 BENIGN NON-NODULAR PROSTATIC HYPERPLASIA WITH LOWER URINARY TRACT SYMPTOMS: Primary | ICD-10-CM

## 2021-10-04 DIAGNOSIS — C61 PROSTATE CANCER: ICD-10-CM

## 2021-10-04 DIAGNOSIS — R35.1 NOCTURIA: ICD-10-CM

## 2021-12-03 ENCOUNTER — PATIENT MESSAGE (OUTPATIENT)
Dept: UROLOGY | Facility: CLINIC | Age: 61
End: 2021-12-03
Payer: COMMERCIAL

## 2021-12-15 ENCOUNTER — OFFICE VISIT (OUTPATIENT)
Dept: FAMILY MEDICINE | Facility: CLINIC | Age: 61
End: 2021-12-15
Payer: COMMERCIAL

## 2021-12-15 VITALS
OXYGEN SATURATION: 99 % | BODY MASS INDEX: 27.25 KG/M2 | DIASTOLIC BLOOD PRESSURE: 82 MMHG | HEIGHT: 72 IN | RESPIRATION RATE: 16 BRPM | TEMPERATURE: 98 F | WEIGHT: 201.19 LBS | HEART RATE: 70 BPM | SYSTOLIC BLOOD PRESSURE: 118 MMHG

## 2021-12-15 DIAGNOSIS — D53.9 MACROCYTIC ANEMIA: ICD-10-CM

## 2021-12-15 DIAGNOSIS — E78.2 MIXED HYPERLIPIDEMIA: ICD-10-CM

## 2021-12-15 DIAGNOSIS — D51.0 PERNICIOUS ANEMIA: ICD-10-CM

## 2021-12-15 DIAGNOSIS — I10 ESSENTIAL HYPERTENSION: ICD-10-CM

## 2021-12-15 DIAGNOSIS — N40.1 BENIGN NON-NODULAR PROSTATIC HYPERPLASIA WITH LOWER URINARY TRACT SYMPTOMS: ICD-10-CM

## 2021-12-15 DIAGNOSIS — C61 PROSTATE CANCER: Primary | ICD-10-CM

## 2021-12-15 DIAGNOSIS — Z23 FLU VACCINE NEED: ICD-10-CM

## 2021-12-15 DIAGNOSIS — R73.01 IFG (IMPAIRED FASTING GLUCOSE): ICD-10-CM

## 2021-12-15 PROCEDURE — 4010F PR ACE/ARB THEARPY RXD/TAKEN: ICD-10-PCS | Mod: CPTII,S$GLB,, | Performed by: NURSE PRACTITIONER

## 2021-12-15 PROCEDURE — 99214 PR OFFICE/OUTPT VISIT, EST, LEVL IV, 30-39 MIN: ICD-10-PCS | Mod: S$GLB,,, | Performed by: NURSE PRACTITIONER

## 2021-12-15 PROCEDURE — 99999 PR PBB SHADOW E&M-EST. PATIENT-LVL III: ICD-10-PCS | Mod: PBBFAC,,, | Performed by: NURSE PRACTITIONER

## 2021-12-15 PROCEDURE — 99214 OFFICE O/P EST MOD 30 MIN: CPT | Mod: S$GLB,,, | Performed by: NURSE PRACTITIONER

## 2021-12-15 PROCEDURE — 99999 PR PBB SHADOW E&M-EST. PATIENT-LVL III: CPT | Mod: PBBFAC,,, | Performed by: NURSE PRACTITIONER

## 2021-12-15 PROCEDURE — 4010F ACE/ARB THERAPY RXD/TAKEN: CPT | Mod: CPTII,S$GLB,, | Performed by: NURSE PRACTITIONER

## 2021-12-15 RX ORDER — AMLODIPINE AND VALSARTAN 5; 320 MG/1; MG/1
1 TABLET ORAL DAILY
Qty: 90 TABLET | Refills: 1 | Status: SHIPPED | OUTPATIENT
Start: 2021-12-15 | End: 2022-05-18

## 2021-12-15 RX ORDER — ROSUVASTATIN CALCIUM 5 MG/1
5 TABLET, COATED ORAL DAILY
Qty: 90 TABLET | Refills: 1 | Status: SHIPPED | OUTPATIENT
Start: 2021-12-15 | End: 2022-06-13 | Stop reason: DRUGHIGH

## 2022-01-04 ENCOUNTER — PATIENT OUTREACH (OUTPATIENT)
Dept: ADMINISTRATIVE | Facility: OTHER | Age: 62
End: 2022-01-04
Payer: COMMERCIAL

## 2022-01-04 NOTE — PROGRESS NOTES
Health Maintenance Due   Topic Date Due    HIV Screening  Never done    Influenza Vaccine (1) 09/01/2021     Updates were requested from care everywhere.  Chart was reviewed for overdue Proactive [facility] Encounters ([facility]) topics (CRS, Breast Cancer Screening, Eye exam)  Health Maintenance has been updated.  LIN[facility] immunization registry triggered.  Immunizations were reconciled.

## 2022-01-05 ENCOUNTER — TELEPHONE (OUTPATIENT)
Dept: UROLOGY | Facility: CLINIC | Age: 62
End: 2022-01-05

## 2022-01-05 ENCOUNTER — OFFICE VISIT (OUTPATIENT)
Dept: UROLOGY | Facility: CLINIC | Age: 62
End: 2022-01-05
Payer: COMMERCIAL

## 2022-01-05 VITALS
BODY MASS INDEX: 27.63 KG/M2 | SYSTOLIC BLOOD PRESSURE: 118 MMHG | WEIGHT: 204 LBS | HEART RATE: 79 BPM | RESPIRATION RATE: 14 BRPM | OXYGEN SATURATION: 97 % | HEIGHT: 72 IN | DIASTOLIC BLOOD PRESSURE: 66 MMHG | TEMPERATURE: 98 F

## 2022-01-05 DIAGNOSIS — R97.20 ELEVATED PSA, LESS THAN 10 NG/ML: Primary | ICD-10-CM

## 2022-01-05 DIAGNOSIS — C61 PROSTATE CANCER: Primary | ICD-10-CM

## 2022-01-05 DIAGNOSIS — N40.1 BENIGN NON-NODULAR PROSTATIC HYPERPLASIA WITH LOWER URINARY TRACT SYMPTOMS: ICD-10-CM

## 2022-01-05 DIAGNOSIS — R35.1 NOCTURIA: ICD-10-CM

## 2022-01-05 PROCEDURE — 3008F BODY MASS INDEX DOCD: CPT | Mod: CPTII,S$GLB,, | Performed by: UROLOGY

## 2022-01-05 PROCEDURE — 3074F SYST BP LT 130 MM HG: CPT | Mod: CPTII,S$GLB,, | Performed by: UROLOGY

## 2022-01-05 PROCEDURE — 99214 OFFICE O/P EST MOD 30 MIN: CPT | Mod: S$GLB,,, | Performed by: UROLOGY

## 2022-01-05 PROCEDURE — 99999 PR PBB SHADOW E&M-EST. PATIENT-LVL III: ICD-10-PCS | Mod: PBBFAC,,, | Performed by: UROLOGY

## 2022-01-05 PROCEDURE — 3078F DIAST BP <80 MM HG: CPT | Mod: CPTII,S$GLB,, | Performed by: UROLOGY

## 2022-01-05 PROCEDURE — 1159F MED LIST DOCD IN RCRD: CPT | Mod: CPTII,S$GLB,, | Performed by: UROLOGY

## 2022-01-05 PROCEDURE — 3008F PR BODY MASS INDEX (BMI) DOCUMENTED: ICD-10-PCS | Mod: CPTII,S$GLB,, | Performed by: UROLOGY

## 2022-01-05 PROCEDURE — 99999 PR PBB SHADOW E&M-EST. PATIENT-LVL III: CPT | Mod: PBBFAC,,, | Performed by: UROLOGY

## 2022-01-05 PROCEDURE — 1159F PR MEDICATION LIST DOCUMENTED IN MEDICAL RECORD: ICD-10-PCS | Mod: CPTII,S$GLB,, | Performed by: UROLOGY

## 2022-01-05 PROCEDURE — 3074F PR MOST RECENT SYSTOLIC BLOOD PRESSURE < 130 MM HG: ICD-10-PCS | Mod: CPTII,S$GLB,, | Performed by: UROLOGY

## 2022-01-05 PROCEDURE — 3078F PR MOST RECENT DIASTOLIC BLOOD PRESSURE < 80 MM HG: ICD-10-PCS | Mod: CPTII,S$GLB,, | Performed by: UROLOGY

## 2022-01-05 PROCEDURE — 99214 PR OFFICE/OUTPT VISIT, EST, LEVL IV, 30-39 MIN: ICD-10-PCS | Mod: S$GLB,,, | Performed by: UROLOGY

## 2022-01-05 PROCEDURE — 1160F PR REVIEW ALL MEDS BY PRESCRIBER/CLIN PHARMACIST DOCUMENTED: ICD-10-PCS | Mod: CPTII,S$GLB,, | Performed by: UROLOGY

## 2022-01-05 PROCEDURE — 1160F RVW MEDS BY RX/DR IN RCRD: CPT | Mod: CPTII,S$GLB,, | Performed by: UROLOGY

## 2022-01-05 NOTE — PATIENT INSTRUCTIONS
Patient to obtain PSA test every may and every December.  Patient will follow-up yearly.  I will notify patient PSA results.
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
## 2022-05-05 ENCOUNTER — PATIENT MESSAGE (OUTPATIENT)
Dept: UROLOGY | Facility: CLINIC | Age: 62
End: 2022-05-05
Payer: COMMERCIAL

## 2022-05-26 ENCOUNTER — OFFICE VISIT (OUTPATIENT)
Dept: PODIATRY | Facility: CLINIC | Age: 62
End: 2022-05-26
Payer: COMMERCIAL

## 2022-05-26 VITALS — WEIGHT: 207.5 LBS | BODY MASS INDEX: 27.5 KG/M2 | HEIGHT: 73 IN

## 2022-05-26 DIAGNOSIS — M20.5X2 ADDUCTOVARUS ROTATION OF TOE, ACQUIRED, LEFT: Primary | ICD-10-CM

## 2022-05-26 DIAGNOSIS — B35.3 TINEA PEDIS, UNSPECIFIED LATERALITY: ICD-10-CM

## 2022-05-26 DIAGNOSIS — B35.1 ONYCHOMYCOSIS DUE TO DERMATOPHYTE: ICD-10-CM

## 2022-05-26 DIAGNOSIS — L84 CORN OR CALLUS: ICD-10-CM

## 2022-05-26 PROCEDURE — 3008F PR BODY MASS INDEX (BMI) DOCUMENTED: ICD-10-PCS | Mod: CPTII,S$GLB,, | Performed by: PODIATRIST

## 2022-05-26 PROCEDURE — 1159F MED LIST DOCD IN RCRD: CPT | Mod: CPTII,S$GLB,, | Performed by: PODIATRIST

## 2022-05-26 PROCEDURE — 1160F PR REVIEW ALL MEDS BY PRESCRIBER/CLIN PHARMACIST DOCUMENTED: ICD-10-PCS | Mod: CPTII,S$GLB,, | Performed by: PODIATRIST

## 2022-05-26 PROCEDURE — 99203 PR OFFICE/OUTPT VISIT, NEW, LEVL III, 30-44 MIN: ICD-10-PCS | Mod: S$GLB,,, | Performed by: PODIATRIST

## 2022-05-26 PROCEDURE — 99999 PR PBB SHADOW E&M-EST. PATIENT-LVL III: ICD-10-PCS | Mod: PBBFAC,,, | Performed by: PODIATRIST

## 2022-05-26 PROCEDURE — 99203 OFFICE O/P NEW LOW 30 MIN: CPT | Mod: S$GLB,,, | Performed by: PODIATRIST

## 2022-05-26 PROCEDURE — 1159F PR MEDICATION LIST DOCUMENTED IN MEDICAL RECORD: ICD-10-PCS | Mod: CPTII,S$GLB,, | Performed by: PODIATRIST

## 2022-05-26 PROCEDURE — 4010F PR ACE/ARB THEARPY RXD/TAKEN: ICD-10-PCS | Mod: CPTII,S$GLB,, | Performed by: PODIATRIST

## 2022-05-26 PROCEDURE — 1160F RVW MEDS BY RX/DR IN RCRD: CPT | Mod: CPTII,S$GLB,, | Performed by: PODIATRIST

## 2022-05-26 PROCEDURE — 3008F BODY MASS INDEX DOCD: CPT | Mod: CPTII,S$GLB,, | Performed by: PODIATRIST

## 2022-05-26 PROCEDURE — 4010F ACE/ARB THERAPY RXD/TAKEN: CPT | Mod: CPTII,S$GLB,, | Performed by: PODIATRIST

## 2022-05-26 PROCEDURE — 99999 PR PBB SHADOW E&M-EST. PATIENT-LVL III: CPT | Mod: PBBFAC,,, | Performed by: PODIATRIST

## 2022-05-26 RX ORDER — CLOTRIMAZOLE AND BETAMETHASONE DIPROPIONATE 10; .64 MG/G; MG/G
CREAM TOPICAL 2 TIMES DAILY
Qty: 45 G | Refills: 3 | Status: SHIPPED | OUTPATIENT
Start: 2022-05-26 | End: 2022-06-13

## 2022-05-26 RX ORDER — CICLOPIROX 80 MG/ML
SOLUTION TOPICAL NIGHTLY
Qty: 6.6 ML | Refills: 3 | Status: SHIPPED | OUTPATIENT
Start: 2022-05-26 | End: 2022-06-13

## 2022-05-26 NOTE — PROGRESS NOTES
Subjective:      Patient ID: Diallo Dawkins is a 61 y.o. male.    Chief Complaint: Foot Problem (Patient presents today as anew patient complaining the his 5th toe on left foot is causing him pain.)      61 y.o. male presenting with left foot 5th toe pain.  Patient noticing that his 5th toe is turning in.  Ambulating in regular tennis shoes.  Points medial aspect of the 5th toe for pain.  Presenting with painful callus along the medial aspect of the 5th toe.  Denies left foot injury.  Patient tried to put cotton ball between the toes which helped with overall symptoms.  Patient also complains of itching/skin rash on bilateral feet.     Review of Systems   Constitutional: Negative for chills, decreased appetite, fever and malaise/fatigue.   HENT: Negative for congestion, ear discharge and sore throat.    Eyes: Negative for discharge and pain.   Cardiovascular: Negative for chest pain, claudication and leg swelling.   Respiratory: Negative for cough and shortness of breath.    Skin: Positive for color change and itching. Negative for nail changes and rash.   Musculoskeletal: Negative for arthritis, joint pain, joint swelling and muscle weakness.        Left foot pain   Gastrointestinal: Negative for bloating, abdominal pain, diarrhea, nausea and vomiting.   Genitourinary: Negative for flank pain and hematuria.   Neurological: Negative for headaches, numbness and weakness.   Psychiatric/Behavioral: Negative for altered mental status.             Past Medical History:   Diagnosis Date    Benign non-nodular prostatic hyperplasia with lower urinary tract symptoms 07/19/2016    Followed by Dr. Arana    Cervical spondylosis 2017    orthopedic - Dr. Galaviz    COVID-19 virus detected 10/18/2020    [facility].    Elevated PSA, less than 10 ng/ml 5/9/2018    Essential hypertension 3/9/2018    GERD with esophagitis     EGD done 12/11/2018 with Dr. Mynor Montemayor    Pernicious anemia     per patient  report    Prostate cancer 8/31/2018    prostate biopsy 5/14/2018 - no treatment required - being followed by [facility] Dr. Arana.    Urinary tract infection        Past Surgical History:   Procedure Laterality Date    COLONOSCOPY  05/24/2018    [facility] Dr. Mynor Montemayor--Polyps (6 mm) in ascending colon and hepatic flexure, Angioectasia in ascending colon, Internal Hemorrhoids.   Colonoscopy in 5 years     OTHER SURGICAL HISTORY      angiogram on wrist    PROSTATE BIOPSY  05/14/2018    done by Dr. Arana.    UPPER GASTROINTESTINAL ENDOSCOPY  12/11/2018    Dr. Montemayor - [facility] - + 7mm polyp in the cardia biopsied - hyperplastic polyp.  GERD with mild chronic esophagitis present.       Family History   Problem Relation Age of Onset    Cancer Paternal Grandmother     No Known Problems Mother     Heart disease Father         passed age75    Hypertension Father     Hyperlipidemia Father     No Known Problems Sister     No Known Problems Sister     No Known Problems Sister     Kidney disease Neg Hx     Prostate cancer Neg Hx        Social History     Socioeconomic History    Marital status: Single   Occupational History    Occupation: sales   Tobacco Use    Smoking status: Never Smoker    Smokeless tobacco: Never Used   Substance and Sexual Activity    Alcohol use: Yes     Comment: every other weekend - 2 drinks per occasion    Drug use: No    Sexual activity: Yes     Partners: Female       Current Outpatient Medications   Medication Sig Dispense Refill    amlodipine-valsartan (EXFORGE) 5-320 mg per tablet Take 1 tablet by mouth once daily. 90 tablet 0    cholecalciferol, vitamin D3, (VITAMIN D3) 25 mcg (1,000 unit) capsule Take 1 capsule (1,000 Units total) by mouth once daily. 90 capsule 3    cyanocobalamin (VITAMIN B-12) 1000 MCG tablet Take 1 tablet (1,000 mcg total) by mouth once daily. 90 tablet 3    rosuvastatin (CRESTOR) 5 MG tablet Take 1 tablet (5 mg total) by  "mouth once daily. 90 tablet 1    ciclopirox (PENLAC) 8 % Soln Apply topically nightly. 6.6 mL 3    clotrimazole-betamethasone 1-0.05% (LOTRISONE) cream Apply topically 2 (two) times daily. 45 g 3     No current facility-administered medications for this visit.       Review of patient's allergies indicates:  No Known Allergies    Vitals:    05/26/22 1504   Weight: 94.1 kg (207 lb 8 oz)   Height: 6' 1" (1.854 m)   PainSc: 0-No pain       Objective:      Physical Exam  Constitutional:       General: He is not in acute distress.     Appearance: He is well-developed.   HENT:      Nose: Nose normal.   Eyes:      Conjunctiva/sclera: Conjunctivae normal.   Pulmonary:      Effort: Pulmonary effort is normal.   Chest:      Chest wall: No tenderness.   Abdominal:      Tenderness: There is no abdominal tenderness.   Musculoskeletal:      Cervical back: Normal range of motion.   Neurological:      Mental Status: He is alert and oriented to person, place, and time.   Psychiatric:         Behavior: Behavior normal.         Vascular: Distal DP/PT pulses palpable 2/4. CRT < 3 sec to tips of toes. No vericosities noted to LEs. Hair growth present LE, warm to touch LE, No edema noted to LE.    Dermatologic: No open lesions, lacerations or wounds. Interdigital spaces clean, dry and intact. No erythema, rubor, calor noted LE  B/l feet: Scaling dryness in a moccasin distribution is noted to the bilateral lower extremities with associated erythema. Toenails 1-5 bilaterally thickened by 2-3 mm, discolored/yellowed, dystrophic, brittle with subungual debris. No incurvation.     Musculoskeletal: MMT 5/5 in DF/PF/Inv/Ev resistance with no reproduction of pain in any direction. Passive range of motion of ankle and pedal joints is painless. No calf tenderness LE, Compartments soft/compressible.   L foot:  Adductovarus deformity of the 5th toe, pain along medial aspect of the 5th toe.     Neurological: Light touch, proprioception, and " sharp/dull sensation are all intact. Protective threshold with the Sargents-Wienstein monofilament is intact. Vibratory sensation intact.         Assessment:       Encounter Diagnoses   Name Primary?    Adductovarus rotation of toe, acquired, left Yes    Corn or callus     Tinea pedis, unspecified laterality          Plan:       Diallo was seen today for foot problem.    Diagnoses and all orders for this visit:    Adductovarus rotation of toe, acquired, left    Corn or callus    Tinea pedis, unspecified laterality    Other orders  -     clotrimazole-betamethasone 1-0.05% (LOTRISONE) cream; Apply topically 2 (two) times daily.  -     ciclopirox (PENLAC) 8 % Soln; Apply topically nightly.      I counseled the patient on his conditions, their implications and medical management.    61 y.o. male with left 5th toe adductovarus, tinea pedis, onychomycosis.      -Rx Lotrisone  -Rx Penlac  -different treatment options for varus deformity of the left 5th toe discussed with the patient.  I went over both conservative and surgical.  Patient like to try toe separator/toe sleeve.     -I reviewed imaging, clinical history, and diagnosis as above with the patient. I attempted to use layman's terms to educate the patient as well as utilize foot models and/or pictures. I personally went through imaging with the patient.    -It was discussed the importance of wearing shoes with adequate room in toe box to accommodate toe deformities. Recommended New Balance/[employer]ics shoe brands with adequate arch supports to alleviate abnormal pressure and improve stability of foot while walking. Avoid flat shoes and barefoot walking as these will exacerbate or worsen symptoms.   -f/u prn     Note dictated with voice recognition software, please excuse any grammatical errors.

## 2022-06-13 ENCOUNTER — OFFICE VISIT (OUTPATIENT)
Dept: FAMILY MEDICINE | Facility: CLINIC | Age: 62
End: 2022-06-13
Payer: COMMERCIAL

## 2022-06-13 VITALS
SYSTOLIC BLOOD PRESSURE: 136 MMHG | OXYGEN SATURATION: 98 % | BODY MASS INDEX: 29.24 KG/M2 | TEMPERATURE: 99 F | WEIGHT: 208.88 LBS | DIASTOLIC BLOOD PRESSURE: 84 MMHG | HEIGHT: 71 IN | HEART RATE: 69 BPM

## 2022-06-13 DIAGNOSIS — M54.12 CERVICAL RADICULOPATHY: ICD-10-CM

## 2022-06-13 DIAGNOSIS — Z00.00 ANNUAL PHYSICAL EXAM: Primary | ICD-10-CM

## 2022-06-13 DIAGNOSIS — C61 PROSTATE CANCER: ICD-10-CM

## 2022-06-13 DIAGNOSIS — R73.01 IFG (IMPAIRED FASTING GLUCOSE): ICD-10-CM

## 2022-06-13 DIAGNOSIS — E78.2 MIXED HYPERLIPIDEMIA: ICD-10-CM

## 2022-06-13 DIAGNOSIS — I10 ESSENTIAL HYPERTENSION: ICD-10-CM

## 2022-06-13 DIAGNOSIS — N40.1 BENIGN NON-NODULAR PROSTATIC HYPERPLASIA WITH LOWER URINARY TRACT SYMPTOMS: ICD-10-CM

## 2022-06-13 DIAGNOSIS — R53.83 FATIGUE, UNSPECIFIED TYPE: ICD-10-CM

## 2022-06-13 DIAGNOSIS — D64.9 CHRONIC ANEMIA: ICD-10-CM

## 2022-06-13 DIAGNOSIS — Z11.4 SCREENING FOR HIV WITHOUT PRESENCE OF RISK FACTORS: ICD-10-CM

## 2022-06-13 PROCEDURE — 99999 PR PBB SHADOW E&M-EST. PATIENT-LVL III: ICD-10-PCS | Mod: PBBFAC,,, | Performed by: NURSE PRACTITIONER

## 2022-06-13 PROCEDURE — 4010F ACE/ARB THERAPY RXD/TAKEN: CPT | Mod: CPTII,S$GLB,, | Performed by: NURSE PRACTITIONER

## 2022-06-13 PROCEDURE — 3075F PR MOST RECENT SYSTOLIC BLOOD PRESS GE 130-139MM HG: ICD-10-PCS | Mod: CPTII,S$GLB,, | Performed by: NURSE PRACTITIONER

## 2022-06-13 PROCEDURE — 3075F SYST BP GE 130 - 139MM HG: CPT | Mod: CPTII,S$GLB,, | Performed by: NURSE PRACTITIONER

## 2022-06-13 PROCEDURE — 3008F BODY MASS INDEX DOCD: CPT | Mod: CPTII,S$GLB,, | Performed by: NURSE PRACTITIONER

## 2022-06-13 PROCEDURE — 99396 PREV VISIT EST AGE 40-64: CPT | Mod: S$GLB,,, | Performed by: NURSE PRACTITIONER

## 2022-06-13 PROCEDURE — 3079F DIAST BP 80-89 MM HG: CPT | Mod: CPTII,S$GLB,, | Performed by: NURSE PRACTITIONER

## 2022-06-13 PROCEDURE — 99396 PR PREVENTIVE VISIT,EST,40-64: ICD-10-PCS | Mod: S$GLB,,, | Performed by: NURSE PRACTITIONER

## 2022-06-13 PROCEDURE — 3008F PR BODY MASS INDEX (BMI) DOCUMENTED: ICD-10-PCS | Mod: CPTII,S$GLB,, | Performed by: NURSE PRACTITIONER

## 2022-06-13 PROCEDURE — 1160F RVW MEDS BY RX/DR IN RCRD: CPT | Mod: CPTII,S$GLB,, | Performed by: NURSE PRACTITIONER

## 2022-06-13 PROCEDURE — 3044F HG A1C LEVEL LT 7.0%: CPT | Mod: CPTII,S$GLB,, | Performed by: NURSE PRACTITIONER

## 2022-06-13 PROCEDURE — 1159F MED LIST DOCD IN RCRD: CPT | Mod: CPTII,S$GLB,, | Performed by: NURSE PRACTITIONER

## 2022-06-13 PROCEDURE — 1159F PR MEDICATION LIST DOCUMENTED IN MEDICAL RECORD: ICD-10-PCS | Mod: CPTII,S$GLB,, | Performed by: NURSE PRACTITIONER

## 2022-06-13 PROCEDURE — 4010F PR ACE/ARB THEARPY RXD/TAKEN: ICD-10-PCS | Mod: CPTII,S$GLB,, | Performed by: NURSE PRACTITIONER

## 2022-06-13 PROCEDURE — 3044F PR MOST RECENT HEMOGLOBIN A1C LEVEL <7.0%: ICD-10-PCS | Mod: CPTII,S$GLB,, | Performed by: NURSE PRACTITIONER

## 2022-06-13 PROCEDURE — 1160F PR REVIEW ALL MEDS BY PRESCRIBER/CLIN PHARMACIST DOCUMENTED: ICD-10-PCS | Mod: CPTII,S$GLB,, | Performed by: NURSE PRACTITIONER

## 2022-06-13 PROCEDURE — 3079F PR MOST RECENT DIASTOLIC BLOOD PRESSURE 80-89 MM HG: ICD-10-PCS | Mod: CPTII,S$GLB,, | Performed by: NURSE PRACTITIONER

## 2022-06-13 PROCEDURE — 99999 PR PBB SHADOW E&M-EST. PATIENT-LVL III: CPT | Mod: PBBFAC,,, | Performed by: NURSE PRACTITIONER

## 2022-06-13 RX ORDER — ROSUVASTATIN CALCIUM 10 MG/1
10 TABLET, COATED ORAL DAILY
Qty: 90 TABLET | Refills: 1 | Status: SHIPPED | OUTPATIENT
Start: 2022-06-13 | End: 2022-09-16 | Stop reason: SDUPTHER

## 2022-06-13 RX ORDER — AMLODIPINE AND VALSARTAN 5; 320 MG/1; MG/1
1 TABLET ORAL DAILY
Qty: 90 TABLET | Refills: 1 | Status: SHIPPED | OUTPATIENT
Start: 2022-06-13 | End: 2022-09-16 | Stop reason: SDUPTHER

## 2022-06-13 RX ORDER — LANOLIN ALCOHOL/MO/W.PET/CERES
1000 CREAM (GRAM) TOPICAL DAILY
Qty: 90 TABLET | Refills: 3 | Status: SHIPPED | OUTPATIENT
Start: 2022-06-13 | End: 2022-12-19 | Stop reason: SDUPTHER

## 2022-06-13 RX ORDER — VIT C/E/ZN/COPPR/LUTEIN/ZEAXAN 250MG-90MG
1000 CAPSULE ORAL DAILY
Qty: 90 CAPSULE | Refills: 3 | Status: SHIPPED | OUTPATIENT
Start: 2022-06-13 | End: 2022-12-19 | Stop reason: SDUPTHER

## 2022-06-13 NOTE — PROGRESS NOTES
Subjective:       Patient ID: Diallo Dawkins is a 61 y.o. male.    Chief Complaint: Annual Exam    HPI    Patient is a 61 year old white male with Hypertension, Hyperlipidemia, IFG, Prostate Cancer with BPH followed by Dr. Arana, mild pernicious anemia, cervical radiculopathy  and chronic GERD followed by Dr. Montemayor that is here today for ANNUAL physical exam with fasting lab results..     Hypertension  takes Amlodine -Valsartan 5- 320 mg daily.   /84   Pulse 69   Temp 98.6 °F (37 °C)   Ht 6' (1.829 m)   Wt 94.7 kg (208 lb 14.2 oz)   SpO2 98%   BMI 28.33 kg/m²      Hyperlipidemia  takes Rosuvastatin 5 mg daily.   LDL 102.8  Will increase Rosuvastatin to 10 mg daily and recheck in 6 months.    Prostate Cancer   diagnosed around May 2018 when had prostate biopsy done but has not required any treatment.    PSA is checked every 6 months by Dr. Arana along with BPH  He has follow up appointment in January 2023.     Chronic Anemia/Pernicious Anemia  History of Anemia  Anemia workup in 2018:  His iron and Ferritin levels were normal.  His Vitamin D level was normal but on low end of normal at 35.  His Vitamin B12 level was also normal at 386 but on the lower end of normal - recommended daily supplementation at April 2018 visit.  Little drop in H&H with current labs  Will recheck in 6 months with b12 and iron levels as well    Impaired Fasting Glucose   with HgbA1C of 5.1% at May 2018 visit - had advised on lifestyle modifications.    TODAY, IFG is now 120 and HgbA1C 5.3%.    Again recommended stricter lifestyle modifications and will recheck in 6 months.    Cervical Radiculopathy  Followed by Dr. Cannon ([facility])  Had epidural injection to neck 3/23/22       Wellness labs:  Chronic Anemia - levels dropped a little with low WBC - will check back 6 months  CMP with , otherwise okay  Cholesterol - high - will increase medication    Health Maintenance:  HIV screen with next blood  draw    Component      Latest Ref Rng & Units 6/10/2022 12/10/2021 6/21/2021 5/27/2021                WBC      3.90 - 12.70 K/uL 3.61 (L) 4.14 6.35 4.52   RBC      4.60 - 6.20 M/uL 3.96 (L) 4.25 (L) 4.10 (L) 4.19 (L)   Hemoglobin      14.0 - 18.0 g/dL 12.7 (L) 13.8 (L) 13.3 (L) 13.7 (L)   Hematocrit      40.0 - 54.0 % 36.8 (L) 38.5 (L) 36.9 (L) 37.9 (L)   MCV      82 - 98 fL 93 91 90 91   MCH      27.0 - 31.0 pg 32.1 (H) 32.5 (H) 32.4 (H) 32.7 (H)   MCHC      32.0 - 36.0 g/dL 34.5 35.8 36.0 36.1 (H)   RDW      11.5 - 14.5 % 14.0 13.4 13.6 13.9   Platelets      150 - 450 K/uL 168 172 183 176   MPV      9.2 - 12.9 fL 10.5 10.2 9.5 9.9   Immature Granulocytes      0.0 - 0.5 % 0.0 0.2 0.2 0.2   Gran # (ANC)      1.8 - 7.7 K/uL 1.8 2.1 2.7 2.3   Immature Grans (Abs)      0.00 - 0.04 K/uL 0.00 0.01 0.01 0.01   Lymph #      1.0 - 4.8 K/uL 1.4 1.5 2.6 1.5   Mono #      0.3 - 1.0 K/uL 0.4 0.4 0.8 0.5   Eos #      0.0 - 0.5 K/uL 0.1 0.1 0.2 0.1   Baso #      0.00 - 0.20 K/uL 0.02 0.03 0.03 0.04   nRBC      0 /100 WBC 0 0 0 0   Gran %      38.0 - 73.0 % 49.3 50.8 42.3 51.1   Lymph %      18.0 - 48.0 % 37.4 36.5 41.1 33.6   Mono %      4.0 - 15.0 % 10.2 9.9 12.6 11.3   Eosinophil %      0.0 - 8.0 % 2.5 1.9 3.3 2.9   Basophil %      0.0 - 1.9 % 0.6 0.7 0.5 0.9   Differential Method       Automated Automated Automated Automated   Sodium      136 - 145 mmol/L 142 145 140 142   Potassium      3.5 - 5.1 mmol/L 4.8 4.6 3.4 (L) 4.9   Chloride      95 - 110 mmol/L 104 102 102 105   CO2      23 - 29 mmol/L 27 28 27 29   Glucose      70 - 110 mg/dL 120 (H) 100 172 (H) 121 (H)   BUN      2 - 20 mg/dL 17 19 23 (H) 22 (H)   Creatinine      0.50 - 1.40 mg/dL 1.05 0.95 1.27 1.03   Calcium      8.7 - 10.5 mg/dL 8.9 9.2 9.1 9.3   PROTEIN TOTAL      6.0 - 8.4 g/dL 7.4 7.5 7.6 7.3   Albumin      3.5 - 5.2 g/dL 4.4 4.6 4.5 4.3   BILIRUBIN TOTAL      0.1 - 1.0 mg/dL 0.8 0.6 0.5 0.6   Alkaline Phosphatase      38 - 126 U/L 51 48 53 45   AST      15  - 46 U/L 39 31 28 36   ALT      10 - 44 U/L 21 22 24 26   Anion Gap      8 - 16 mmol/L 11 15 11 8   eGFR if African American      >60 mL/min/1.73 m:2 >60.0 >60.0 >60.0 >60.0   eGFR if non African American      >60 mL/min/1.73 m:2 >60.0 >60.0 >60.0 >60.0   Cholesterol      120 - 199 mg/dL 168 134  128   Triglycerides      30 - 150 mg/dL 151 (H) 112  107   HDL      40 - 75 mg/dL 35 (L) 38 (L)  34 (L)   LDL Cholesterol External      63.0 - 159.0 mg/dL 102.8 73.6  72.6   HDL/Cholesterol Ratio      20.0 - 50.0 % 20.8 28.4  26.6   Total Cholesterol/HDL Ratio      2.0 - 5.0 4.8 3.5  3.8   Non-HDL Cholesterol      mg/dL 133 96  94   Hemoglobin A1C External      4.0 - 5.6 % 5.3 5.2  5.3   Estimated Avg Glucose      68 - 131 mg/dL 105 103  105       Review of Systems   Constitutional: Negative for activity change, appetite change, fatigue, fever and unexpected weight change.   HENT: Negative for congestion, ear pain, mouth sores, nosebleeds, postnasal drip, rhinorrhea, sinus pressure, sneezing, sore throat, trouble swallowing and voice change.    Eyes: Negative.    Respiratory: Negative for cough, chest tightness and shortness of breath.    Cardiovascular: Negative for chest pain, palpitations and leg swelling.   Gastrointestinal: Negative.  Negative for abdominal pain, blood in stool, constipation, diarrhea, nausea and vomiting.   Endocrine: Negative.    Genitourinary: Negative for difficulty urinating, dysuria, flank pain, hematuria and urgency.   Musculoskeletal: Negative for arthralgias, back pain, gait problem, joint swelling, myalgias and neck pain.   Skin: Negative for color change, rash and wound.   Allergic/Immunologic: Negative for immunocompromised state.   Neurological: Negative for dizziness, tremors, seizures, syncope, speech difficulty and headaches.   Hematological: Negative for adenopathy. Does not bruise/bleed easily.   Psychiatric/Behavioral: Negative for behavioral problems, dysphoric mood, sleep  disturbance and suicidal ideas. The patient is not nervous/anxious.          Objective:     Vitals:    06/13/22 0810   BP: 136/84   Pulse: 69   Temp: 98.6 °F (37 °C)   SpO2: 98%   Weight: 94.7 kg (208 lb 14.2 oz)   Height: 6' (1.829 m)          Physical Exam  Constitutional:       General: He is not in acute distress.     Appearance: He is well-developed. He is not ill-appearing, toxic-appearing or diaphoretic.      Comments: Body mass index is 28.33 kg/m².       HENT:      Head: Normocephalic and atraumatic.      Right Ear: External ear normal. There is impacted cerumen.      Left Ear: Tympanic membrane and external ear normal. There is no impacted cerumen.      Nose: Nose normal.      Mouth/Throat:      Pharynx: No oropharyngeal exudate.   Eyes:      General: No scleral icterus.        Right eye: No discharge.         Left eye: No discharge.      Extraocular Movements: Extraocular movements intact.      Conjunctiva/sclera: Conjunctivae normal.   Neck:      Thyroid: No thyromegaly.      Trachea: No tracheal deviation.   Cardiovascular:      Rate and Rhythm: Normal rate and regular rhythm.      Heart sounds: Normal heart sounds. No murmur heard.  Pulmonary:      Effort: Pulmonary effort is normal. No respiratory distress.      Breath sounds: Normal breath sounds.   Abdominal:      General: Bowel sounds are normal. There is no distension.      Palpations: Abdomen is soft. There is no mass.      Tenderness: There is no abdominal tenderness. There is no guarding or rebound.      Hernia: No hernia is present.   Genitourinary:     Comments: Deferred to urologist  Musculoskeletal:         General: No swelling or deformity. Normal range of motion.      Cervical back: Normal range of motion and neck supple.      Right lower leg: No edema.      Left lower leg: No edema.   Lymphadenopathy:      Cervical: No cervical adenopathy.   Skin:     General: Skin is warm and dry.      Findings: No rash.   Neurological:      Mental  Status: He is alert and oriented to person, place, and time.      Coordination: Coordination normal.   Psychiatric:         Mood and Affect: Mood normal.         Behavior: Behavior normal.         Thought Content: Thought content normal.         Judgment: Judgment normal.           Assessment:         ICD-10-CM ICD-9-CM   1. Annual physical exam  Z00.00 V70.0   2. Prostate cancer  C61 185   3. Benign non-nodular prostatic hyperplasia with lower urinary tract symptoms  N40.1 600.91   4. Essential hypertension  I10 401.9   5. Mixed hyperlipidemia  E78.2 272.2   6. Chronic anemia  D64.9 285.9   7. IFG (impaired fasting glucose)  R73.01 790.21   8. BMI 28.0-28.9,adult  Z68.28 V85.24   9. Cervical radiculopathy  M54.12 723.4   10. Fatigue, unspecified type  R53.83 780.79   11. Screening for HIV without presence of risk factors  Z11.4 V73.89       Plan:       Annual physical exam  -     HIV 1/2 Ag/Ab (4th Gen); Future; Expected date: 06/13/2022    Health Maintenance Summary     Full History      Expand All  Collapse All    Scheduled - HIV Screening  (Once)  Scheduled for 12/2/2022  No completion history exists for this topic.     Postponed - COVID-19 Vaccine  (3 - Booster for Pfizer series)  Postponed until 9/20/2022 08/14/2021  Imm Admin: COVID-19, MRNA, LN-S, PF (Pfizer) (Purple Cap)    07/15/2021  Imm Admin: COVID-19, MRNA, LN-S, PF (Pfizer) (Purple Cap)      Influenza Vaccine  (Season Ended)  Next due on 9/1/2022 12/01/2020  Imm Admin: Influenza - Quadrivalent - PF *Preferred* (6 months and older)    12/09/2019  Imm Admin: Influenza - Quadrivalent - PF *Preferred* (6 months and older)    01/23/2017  Imm Admin: Influenza - Quadrivalent - PF *Preferred* (6 months and older)      Colorectal Cancer Screening  (Colonoscopy - Every 5 Years)  Next due on 5/24/2023 05/24/2018   COLONOSCOPY    01/01/2011  Colonoscopy (Done - Had polyps, done by MD at )      Pneumococcal Vaccines (Age 0-64)  (3 - PPSV23 or PCV20)  Next  due on 6/2/2025 06/02/2020  Imm Admin: Pneumococcal Polysaccharide - 23 Valent    05/30/2019  Imm Admin: Pneumococcal Conjugate - 13 Valent      TETANUS VACCINE  (Every 10 Years)  Next due on 1/23/2027 01/23/2017  Imm Admin: Tdap      Scheduled - Lipid Panel  (Every 5 Years)  Scheduled for 12/2/2022  06/10/2022  Lipid Panel    12/10/2021  Lipid Panel    05/27/2021  Lipid Panel    11/24/2020  Lipid Panel    05/30/2020  Lipid panel    View More History     Hepatitis C Screening  Completed  03/23/2017  Hepatitis C antibody      Shingles Vaccine  (Series Information)  Completed  02/02/2019  Imm Admin: Zoster Recombinant    10/04/2018  Imm Admin: Zoster Recombinant          Prostate cancer  Followed by Dr. Arana    Benign non-nodular prostatic hyperplasia with lower urinary tract symptoms    Essential hypertension  Continue current medication(s).  Follow up in 6 months.  -     amlodipine-valsartan (EXFORGE) 5-320 mg per tablet; Take 1 tablet by mouth once daily.  Dispense: 90 tablet; Refill: 1    Mixed hyperlipidemia  INCREASE to 10 mg daily and recheck in 6 months.  -     rosuvastatin (CRESTOR) 10 MG tablet; Take 1 tablet (10 mg total) by mouth once daily.  Dispense: 90 tablet; Refill: 1  -     Lipid Panel; Future; Expected date: 06/13/2022    Chronic anemia  -     CBC Auto Differential; Future; Expected date: 06/13/2022  -     Vitamin B12; Future; Expected date: 06/13/2022  -     Iron and TIBC; Future; Expected date: 06/13/2022  -     Ferritin; Future; Expected date: 06/13/2022    IFG (impaired fasting glucose)  Watch sugars in diet  -     Comprehensive Metabolic Panel; Future; Expected date: 06/13/2022  -     Hemoglobin A1C; Future; Expected date: 06/13/2022    BMI 28.0-28.9,adult    Cervical radiculopathy  Followed by Neurology Dr. Cannon    Fatigue, unspecified type  -     cyanocobalamin (VITAMIN B-12) 1000 MCG tablet; Take 1 tablet (1,000 mcg total) by mouth once daily.  Dispense: 90 tablet; Refill: 3  -      cholecalciferol, vitamin D3, (VITAMIN D3) 25 mcg (1,000 unit) capsule; Take 1 capsule (1,000 Units total) by mouth once daily.  Dispense: 90 capsule; Refill: 3    Screening for HIV without presence of risk factors  -     HIV 1/2 Ag/Ab (4th Gen); Future; Expected date: 06/13/2022      Follow up in about 6 months (around 12/13/2022) for fasting labs and follow up.     Patient's Medications   New Prescriptions    ROSUVASTATIN (CRESTOR) 10 MG TABLET    Take 1 tablet (10 mg total) by mouth once daily.   Previous Medications    No medications on file   Modified Medications    Modified Medication Previous Medication    AMLODIPINE-VALSARTAN (EXFORGE) 5-320 MG PER TABLET amlodipine-valsartan (EXFORGE) 5-320 mg per tablet       Take 1 tablet by mouth once daily.    Take 1 tablet by mouth once daily.    CHOLECALCIFEROL, VITAMIN D3, (VITAMIN D3) 25 MCG (1,000 UNIT) CAPSULE cholecalciferol, vitamin D3, (VITAMIN D3) 25 mcg (1,000 unit) capsule       Take 1 capsule (1,000 Units total) by mouth once daily.    Take 1 capsule (1,000 Units total) by mouth once daily.    CYANOCOBALAMIN (VITAMIN B-12) 1000 MCG TABLET cyanocobalamin (VITAMIN B-12) 1000 MCG tablet       Take 1 tablet (1,000 mcg total) by mouth once daily.    Take 1 tablet (1,000 mcg total) by mouth once daily.   Discontinued Medications    CICLOPIROX (PENLAC) 8 % SOLN    Apply topically nightly.    CLOTRIMAZOLE-BETAMETHASONE 1-0.05% (LOTRISONE) CREAM    Apply topically 2 (two) times daily.    ROSUVASTATIN (CRESTOR) 5 MG TABLET    Take 1 tablet (5 mg total) by mouth once daily.       Past Medical History:   Diagnosis Date    Benign non-nodular prostatic hyperplasia with lower urinary tract symptoms 07/19/2016    Followed by Dr. Arana    Cervical radiculopathy 3/7/2022    Follow[facility] by Dr. Cannon - [facility] - had epidural steroid on 3/23/22    Cervical spondylosis 2017    orthopedic - Dr. Guillaume MARTINEZ-19 virus detected 10/18/2020    [facility]    Elevated PSA, less than 10 ng/ml 5/9/2018    Essential hypertension 3/9/2018    GERD with esophagitis     EGD done 12/11/2018 with Dr. Mynor Montemayor    Pernicious anemia     per patient report    Prostate cancer 8/31/2018    prostate biopsy 5/14/2018 - no treatment required - being followed by [facility] Dr. Arana.    Urinary tract infection        Past Surgical History:   Procedure Laterality Date    COLONOSCOPY  05/24/2018    [facility] Dr. Mynor Montemayor--Polyps (6 mm) in ascending colon and hepatic flexure, Angioectasia in ascending colon, Internal Hemorrhoids.   Colonoscopy in 5 years     OTHER SURGICAL HISTORY      angiogram on wrist    PROSTATE BIOPSY  05/14/2018    done by Dr. Arana.    UPPER GASTROINTESTINAL ENDOSCOPY  12/11/2018    Dr. Montemayor - [facility] - + 7mm polyp in the cardia biopsied - hyperplastic polyp.  GERD with mild chronic esophagitis present.       Family History   Problem Relation Age of Onset    Cancer Paternal Grandmother     No Known Problems Mother     Heart disease Father         passed age75    Hypertension Father     Hyperlipidemia Father     No Known Problems Sister     No Known Problems Sister     No Known Problems Sister     Kidney disease Neg Hx     Prostate cancer Neg Hx        Social History     Socioeconomic History    Marital status: Single   Occupational History    Occupation: sales   Tobacco Use    Smoking status: Never Smoker    Smokeless tobacco: Never Used   Substance and Sexual Activity    Alcohol use: Yes     Comment: every other weekend - 2 drinks per occasion    Drug use: No    Sexual activity: Yes     Partners: Female

## 2022-06-14 ENCOUNTER — PATIENT MESSAGE (OUTPATIENT)
Dept: UROLOGY | Facility: CLINIC | Age: 62
End: 2022-06-14
Payer: COMMERCIAL

## 2022-09-07 ENCOUNTER — OCCUPATIONAL HEALTH (OUTPATIENT)
Dept: URGENT CARE | Facility: CLINIC | Age: 62
End: 2022-09-07

## 2022-09-07 DIAGNOSIS — Z02.83 ENCOUNTER FOR DRUG SCREENING: Primary | ICD-10-CM

## 2022-09-07 LAB
CTP QC/QA: YES
POC 10 PANEL DRUG SCREEN: NEGATIVE

## 2022-09-07 PROCEDURE — 80305 DRUG TEST PRSMV DIR OPT OBS: CPT | Mod: S$GLB,,, | Performed by: EMERGENCY MEDICINE

## 2022-09-07 PROCEDURE — 80305 POCT RAPID DRUG SCREEN 10 PANEL: ICD-10-PCS | Mod: S$GLB,,, | Performed by: EMERGENCY MEDICINE

## 2022-09-16 ENCOUNTER — PATIENT MESSAGE (OUTPATIENT)
Dept: FAMILY MEDICINE | Facility: CLINIC | Age: 62
End: 2022-09-16
Payer: COMMERCIAL

## 2022-09-16 DIAGNOSIS — I10 ESSENTIAL HYPERTENSION: ICD-10-CM

## 2022-09-16 DIAGNOSIS — E78.2 MIXED HYPERLIPIDEMIA: ICD-10-CM

## 2022-09-16 RX ORDER — ROSUVASTATIN CALCIUM 10 MG/1
10 TABLET, COATED ORAL DAILY
Qty: 90 TABLET | Refills: 0 | Status: SHIPPED | OUTPATIENT
Start: 2022-09-16 | End: 2022-09-26 | Stop reason: SDUPTHER

## 2022-09-16 RX ORDER — AMLODIPINE AND VALSARTAN 5; 320 MG/1; MG/1
1 TABLET ORAL DAILY
Qty: 90 TABLET | Refills: 0 | Status: SHIPPED | OUTPATIENT
Start: 2022-09-16 | End: 2022-09-26 | Stop reason: SDUPTHER

## 2022-09-26 ENCOUNTER — PATIENT MESSAGE (OUTPATIENT)
Dept: FAMILY MEDICINE | Facility: CLINIC | Age: 62
End: 2022-09-26
Payer: COMMERCIAL

## 2022-09-26 DIAGNOSIS — I10 ESSENTIAL HYPERTENSION: ICD-10-CM

## 2022-09-26 DIAGNOSIS — E78.2 MIXED HYPERLIPIDEMIA: ICD-10-CM

## 2022-09-26 RX ORDER — ROSUVASTATIN CALCIUM 10 MG/1
10 TABLET, COATED ORAL DAILY
Qty: 90 TABLET | Refills: 0 | Status: SHIPPED | OUTPATIENT
Start: 2022-09-26 | End: 2022-12-19 | Stop reason: SDUPTHER

## 2022-09-26 RX ORDER — AMLODIPINE AND VALSARTAN 5; 320 MG/1; MG/1
1 TABLET ORAL DAILY
Qty: 90 TABLET | Refills: 0 | Status: SHIPPED | OUTPATIENT
Start: 2022-09-26 | End: 2022-12-19 | Stop reason: SDUPTHER

## 2022-09-28 ENCOUNTER — TELEPHONE (OUTPATIENT)
Dept: FAMILY MEDICINE | Facility: CLINIC | Age: 62
End: 2022-09-28
Payer: COMMERCIAL

## 2022-09-28 NOTE — TELEPHONE ENCOUNTER
Spoke with pharmacy in regards of message- I advised patient medication was sent to the pharmacy on 09/26- patient said he will call pharmacy to check status.

## 2022-09-28 NOTE — TELEPHONE ENCOUNTER
----- Message from Vitaly Maynard sent at 9/28/2022  2:10 PM CDT -----  Type:  Needs Medical Advice    Who Called:   Reason: regarding his valsratan   Would the patient rather a call back or a response via [facility]? call  Best Call Back Number: [phone]  Additional Informationnone

## 2022-12-19 ENCOUNTER — OFFICE VISIT (OUTPATIENT)
Dept: FAMILY MEDICINE | Facility: CLINIC | Age: 62
End: 2022-12-19
Payer: COMMERCIAL

## 2022-12-19 ENCOUNTER — TELEPHONE (OUTPATIENT)
Dept: FAMILY MEDICINE | Facility: CLINIC | Age: 62
End: 2022-12-19
Payer: COMMERCIAL

## 2022-12-19 VITALS
SYSTOLIC BLOOD PRESSURE: 120 MMHG | WEIGHT: 213.19 LBS | HEART RATE: 77 BPM | HEIGHT: 71 IN | OXYGEN SATURATION: 98 % | TEMPERATURE: 98 F | DIASTOLIC BLOOD PRESSURE: 84 MMHG | BODY MASS INDEX: 29.85 KG/M2

## 2022-12-19 DIAGNOSIS — E66.3 OVERWEIGHT WITH BODY MASS INDEX (BMI) OF 29 TO 29.9 IN ADULT: ICD-10-CM

## 2022-12-19 DIAGNOSIS — C61 PROSTATE CANCER: Primary | ICD-10-CM

## 2022-12-19 DIAGNOSIS — R73.01 IFG (IMPAIRED FASTING GLUCOSE): ICD-10-CM

## 2022-12-19 DIAGNOSIS — I10 ESSENTIAL HYPERTENSION: ICD-10-CM

## 2022-12-19 DIAGNOSIS — Z13.29 THYROID DISORDER SCREEN: ICD-10-CM

## 2022-12-19 DIAGNOSIS — D51.0 PERNICIOUS ANEMIA: ICD-10-CM

## 2022-12-19 DIAGNOSIS — E78.2 MIXED HYPERLIPIDEMIA: ICD-10-CM

## 2022-12-19 DIAGNOSIS — N40.1 BENIGN NON-NODULAR PROSTATIC HYPERPLASIA WITH LOWER URINARY TRACT SYMPTOMS: ICD-10-CM

## 2022-12-19 DIAGNOSIS — Z86.39 HISTORY OF VITAMIN D DEFICIENCY: ICD-10-CM

## 2022-12-19 DIAGNOSIS — R53.83 FATIGUE, UNSPECIFIED TYPE: ICD-10-CM

## 2022-12-19 DIAGNOSIS — D64.9 CHRONIC ANEMIA: ICD-10-CM

## 2022-12-19 DIAGNOSIS — M54.12 CERVICAL RADICULOPATHY: ICD-10-CM

## 2022-12-19 PROCEDURE — 3044F PR MOST RECENT HEMOGLOBIN A1C LEVEL <7.0%: ICD-10-PCS | Mod: CPTII,S$GLB,, | Performed by: NURSE PRACTITIONER

## 2022-12-19 PROCEDURE — 99999 PR PBB SHADOW E&M-EST. PATIENT-LVL IV: ICD-10-PCS | Mod: PBBFAC,,, | Performed by: NURSE PRACTITIONER

## 2022-12-19 PROCEDURE — 3079F PR MOST RECENT DIASTOLIC BLOOD PRESSURE 80-89 MM HG: ICD-10-PCS | Mod: CPTII,S$GLB,, | Performed by: NURSE PRACTITIONER

## 2022-12-19 PROCEDURE — 3079F DIAST BP 80-89 MM HG: CPT | Mod: CPTII,S$GLB,, | Performed by: NURSE PRACTITIONER

## 2022-12-19 PROCEDURE — 1160F RVW MEDS BY RX/DR IN RCRD: CPT | Mod: CPTII,S$GLB,, | Performed by: NURSE PRACTITIONER

## 2022-12-19 PROCEDURE — 4010F ACE/ARB THERAPY RXD/TAKEN: CPT | Mod: CPTII,S$GLB,, | Performed by: NURSE PRACTITIONER

## 2022-12-19 PROCEDURE — 3008F BODY MASS INDEX DOCD: CPT | Mod: CPTII,S$GLB,, | Performed by: NURSE PRACTITIONER

## 2022-12-19 PROCEDURE — 1159F MED LIST DOCD IN RCRD: CPT | Mod: CPTII,S$GLB,, | Performed by: NURSE PRACTITIONER

## 2022-12-19 PROCEDURE — 99214 OFFICE O/P EST MOD 30 MIN: CPT | Mod: S$GLB,,, | Performed by: NURSE PRACTITIONER

## 2022-12-19 PROCEDURE — 4010F PR ACE/ARB THEARPY RXD/TAKEN: ICD-10-PCS | Mod: CPTII,S$GLB,, | Performed by: NURSE PRACTITIONER

## 2022-12-19 PROCEDURE — 99999 PR PBB SHADOW E&M-EST. PATIENT-LVL IV: CPT | Mod: PBBFAC,,, | Performed by: NURSE PRACTITIONER

## 2022-12-19 PROCEDURE — 3074F SYST BP LT 130 MM HG: CPT | Mod: CPTII,S$GLB,, | Performed by: NURSE PRACTITIONER

## 2022-12-19 PROCEDURE — 3074F PR MOST RECENT SYSTOLIC BLOOD PRESSURE < 130 MM HG: ICD-10-PCS | Mod: CPTII,S$GLB,, | Performed by: NURSE PRACTITIONER

## 2022-12-19 PROCEDURE — 3008F PR BODY MASS INDEX (BMI) DOCUMENTED: ICD-10-PCS | Mod: CPTII,S$GLB,, | Performed by: NURSE PRACTITIONER

## 2022-12-19 PROCEDURE — 3044F HG A1C LEVEL LT 7.0%: CPT | Mod: CPTII,S$GLB,, | Performed by: NURSE PRACTITIONER

## 2022-12-19 PROCEDURE — 99214 PR OFFICE/OUTPT VISIT, EST, LEVL IV, 30-39 MIN: ICD-10-PCS | Mod: S$GLB,,, | Performed by: NURSE PRACTITIONER

## 2022-12-19 PROCEDURE — 1159F PR MEDICATION LIST DOCUMENTED IN MEDICAL RECORD: ICD-10-PCS | Mod: CPTII,S$GLB,, | Performed by: NURSE PRACTITIONER

## 2022-12-19 PROCEDURE — 1160F PR REVIEW ALL MEDS BY PRESCRIBER/CLIN PHARMACIST DOCUMENTED: ICD-10-PCS | Mod: CPTII,S$GLB,, | Performed by: NURSE PRACTITIONER

## 2022-12-19 RX ORDER — LANOLIN ALCOHOL/MO/W.PET/CERES
1000 CREAM (GRAM) TOPICAL DAILY
Qty: 90 TABLET | Refills: 3 | Status: SHIPPED | OUTPATIENT
Start: 2022-12-19 | End: 2023-11-27

## 2022-12-19 RX ORDER — VIT C/E/ZN/COPPR/LUTEIN/ZEAXAN 250MG-90MG
1000 CAPSULE ORAL DAILY
Qty: 90 CAPSULE | Refills: 3 | Status: SHIPPED | OUTPATIENT
Start: 2022-12-19 | End: 2023-12-13 | Stop reason: SDUPTHER

## 2022-12-19 RX ORDER — AMLODIPINE AND VALSARTAN 5; 320 MG/1; MG/1
1 TABLET ORAL DAILY
Qty: 90 TABLET | Refills: 1 | Status: SHIPPED | OUTPATIENT
Start: 2022-12-19 | End: 2023-05-30

## 2022-12-19 RX ORDER — ROSUVASTATIN CALCIUM 10 MG/1
10 TABLET, COATED ORAL DAILY
Qty: 90 TABLET | Refills: 1 | Status: SHIPPED | OUTPATIENT
Start: 2022-12-19 | End: 2023-05-30

## 2022-12-19 NOTE — PROGRESS NOTES
"Subjective:       Patient ID: Diallo Dawkins is a 62 y.o. male.    Chief Complaint: Follow-up (6 months F/U)    HPI    Patient is a 62 year old white male with Hypertension, Hyperlipidemia, IFG, Prostate Cancer with BPH followed by Dr. Arana, mild pernicious anemia, cervical radiculopathy  and chronic GERD followed by Dr. Montemayor that is here today for 6 month follow up with fasting lab results..     Hypertension  takes Amlodine -Valsartan 5- 320 mg daily.   /84 (BP Location: Left arm, Patient Position: Sitting, BP Method: Large (Manual))   Pulse 77   Temp 98.1 °F (36.7 °C) (Temporal)   Ht 5' 11" (1.803 m)   Wt 96.7 kg (213 lb 3 oz)   SpO2 98%   BMI 29.73 kg/m²        Hyperlipidemia  takes Rosuvastatin 10 mg daily.   LDL 71     Prostate Cancer   diagnosed around May 2018 when had prostate biopsy done but has not required any treatment.    PSA is checked every 6 months by Dr. Arana along with BPH  He has follow up appointment in January 2023.     Chronic Anemia/Pernicious Anemia  History of Anemia  Anemia workup in 2018:  His iron and Ferritin levels were normal.  His Vitamin D level was normal but on low end of normal at 35.  His Vitamin B12 level was also normal at 386 but on the lower end of normal - recommended daily supplementation at April 2018 visit.  He reports he ran out of vitamin supplements and never had refilled.  Advised to get back on daily Vitamin D and B12  Anemia is still present but mildly improved.     Impaired Fasting Glucose   with HgbA1C of 5.1% at May 2018 visit - had advised on lifestyle modifications.    TODAY, IFG is now 133 and HgbA1C 5.4%.   Admits to eating lots of sweets at night including ice cream and cookies.  Again recommended stricter lifestyle modifications and will recheck in 6 months.     Cervical Radiculopathy  Followed by Dr. Cannon ([facility])  Had epidural injection to neck 3/23/22     Component      Latest Ref Rng & Units 12/6/2022 6/10/2022 " 12/10/2021 6/21/2021                WBC      3.90 - 12.70 K/uL 3.88 (L) 3.61 (L) 4.14 6.35   RBC      4.60 - 6.20 M/uL 4.25 (L) 3.96 (L) 4.25 (L) 4.10 (L)   HEMOGLOBIN      14.0 - 18.0 g/dL 13.6 (L) 12.7 (L) 13.8 (L) 13.3 (L)   HEMATOCRIT      40.0 - 54.0 % 37.4 (L) 36.8 (L) 38.5 (L) 36.9 (L)   MCV      82 - 98 fL 88 93 91 90   MCH      27.0 - 31.0 pg 32.0 (H) 32.1 (H) 32.5 (H) 32.4 (H)   MCHC      32.0 - 36.0 g/dL 36.4 (H) 34.5 35.8 36.0   RDW      11.5 - 14.5 % 13.6 14.0 13.4 13.6   Platelets      150 - 450 K/uL 167 168 172 183   MPV      9.2 - 12.9 fL 10.1 10.5 10.2 9.5   Immature Granulocytes      0.0 - 0.5 % 0.0 0.0 0.2 0.2   Gran # (ANC)      1.8 - 7.7 K/uL 2.1 1.8 2.1 2.7   Immature Grans (Abs)      0.00 - 0.04 K/uL 0.00 0.00 0.01 0.01   Lymph #      1.0 - 4.8 K/uL 1.2 1.4 1.5 2.6   Mono #      0.3 - 1.0 K/uL 0.4 0.4 0.4 0.8   Eos #      0.0 - 0.5 K/uL 0.1 0.1 0.1 0.2   Baso #      0.00 - 0.20 K/uL 0.02 0.02 0.03 0.03   nRBC      0 /100 WBC 0 0 0 0   Gran %      38.0 - 73.0 % 54.6 49.3 50.8 42.3   Lymph %      18.0 - 48.0 % 31.7 37.4 36.5 41.1   Mono %      4.0 - 15.0 % 10.1 10.2 9.9 12.6   Eosinophil %      0.0 - 8.0 % 3.1 2.5 1.9 3.3   Basophil %      0.0 - 1.9 % 0.5 0.6 0.7 0.5   Differential Method       Automated Automated Automated Automated   Sodium      136 - 145 mmol/L 141 142 145 140   Potassium      3.5 - 5.1 mmol/L 4.2 4.8 4.6 3.4 (L)   Chloride      95 - 110 mmol/L 104 104 102 102   CO2      23 - 29 mmol/L 27 27 28 27   Glucose      70 - 110 mg/dL 133 (H) 120 (H) 100 172 (H)   BUN      2 - 20 mg/dL 20 17 19 23 (H)   Creatinine      0.50 - 1.40 mg/dL 0.98 1.05 0.95 1.27   Calcium      8.7 - 10.5 mg/dL 8.8 8.9 9.2 9.1   PROTEIN TOTAL      6.0 - 8.4 g/dL 7.4 7.4 7.5 7.6   Albumin      3.5 - 5.2 g/dL 4.5 4.4 4.6 4.5   BILIRUBIN TOTAL      0.1 - 1.0 mg/dL 0.7 0.8 0.6 0.5   Alkaline Phosphatase      38 - 126 U/L 55 51 48 53   AST      15 - 46 U/L 27 39 31 28   ALT      10 - 44 U/L 25 21 22 24   ANION  GAP      8 - 16 mmol/L 10 11 15 11   eGFR      >60 mL/min/1.73 m:2 >60.0      Cholesterol      120 - 199 mg/dL 127 168 134    Triglycerides      30 - 150 mg/dL 110 151 (H) 112    HDL      40 - 75 mg/dL 34 (L) 35 (L) 38 (L)    LDL Cholesterol External      63.0 - 159.0 mg/dL 71.0 102.8 73.6    HDL/Cholesterol Ratio      20.0 - 50.0 % 26.8 20.8 28.4    Total Cholesterol/HDL Ratio      2.0 - 5.0 3.7 4.8 3.5    Non-HDL Cholesterol      mg/dL 93 133 96    Iron      45 - 160 ug/dL 107      Transferrin      200 - 375 mg/dL 236      TIBC      250 - 450 ug/dL 349      Saturated Iron      20 - 50 % 31      Hemoglobin A1C External      4.0 - 5.6 % 5.4 5.3 5.2    Estimated Avg Glucose      68 - 131 mg/dL 108 105 103    Vitamin B-12      210 - 950 pg/mL 648      Ferritin      20.0 - 300.0 ng/mL 136      HIV 1/2 Ag/Ab      Non-reactive Non-reactive          Review of Systems   Constitutional:  Negative for activity change, appetite change, fatigue, fever and unexpected weight change.   HENT:  Negative for congestion, ear pain, mouth sores, nosebleeds, postnasal drip, rhinorrhea, sinus pressure, sneezing, sore throat, trouble swallowing and voice change.    Eyes: Negative.    Respiratory:  Negative for cough, chest tightness and shortness of breath.    Cardiovascular:  Negative for chest pain, palpitations and leg swelling.   Gastrointestinal: Negative.  Negative for abdominal pain, blood in stool, constipation, diarrhea, nausea and vomiting.   Endocrine: Negative.    Genitourinary:  Negative for difficulty urinating, dysuria, flank pain, hematuria and urgency.   Musculoskeletal:  Positive for myalgias and neck pain. Negative for arthralgias, back pain, gait problem and joint swelling.   Skin:  Negative for color change, rash and wound.   Allergic/Immunologic: Negative for immunocompromised state.   Neurological:  Negative for dizziness, tremors, seizures, syncope, speech difficulty and headaches.   Hematological:  Negative for  "adenopathy. Does not bruise/bleed easily.   Psychiatric/Behavioral:  Negative for behavioral problems, dysphoric mood, sleep disturbance and suicidal ideas. The patient is not nervous/anxious.        Objective:     Vitals:    12/19/22 1614   BP: 120/84   BP Location: Left arm   Patient Position: Sitting   BP Method: Large (Manual)   Pulse: 77   Temp: 98.1 °F (36.7 °C)   TempSrc: Temporal   SpO2: 98%   Weight: 96.7 kg (213 lb 3 oz)   Height: 5' 11" (1.803 m)          Physical Exam  Constitutional:       General: He is not in acute distress.     Appearance: He is well-developed. He is not ill-appearing, toxic-appearing or diaphoretic.      Comments: Body mass index is 28.33 kg/m².       HENT:      Head: Normocephalic and atraumatic.   Eyes:      General: No scleral icterus.        Right eye: No discharge.         Left eye: No discharge.      Extraocular Movements: Extraocular movements intact.      Conjunctiva/sclera: Conjunctivae normal.   Neck:      Thyroid: No thyromegaly.      Trachea: No tracheal deviation.   Cardiovascular:      Rate and Rhythm: Normal rate and regular rhythm.      Heart sounds: Normal heart sounds. No murmur heard.  Pulmonary:      Effort: Pulmonary effort is normal. No respiratory distress.      Breath sounds: Normal breath sounds.   Abdominal:      General: Bowel sounds are normal. There is no distension.      Palpations: Abdomen is soft. There is no mass.      Tenderness: There is no abdominal tenderness. There is no guarding or rebound.      Hernia: No hernia is present.   Genitourinary:     Comments: Deferred to urologist  Musculoskeletal:         General: No swelling or deformity. Normal range of motion.      Cervical back: Normal range of motion and neck supple.      Right lower leg: No edema.      Left lower leg: No edema.   Lymphadenopathy:      Cervical: No cervical adenopathy.   Skin:     General: Skin is warm and dry.      Findings: No rash.   Neurological:      Mental Status: He is " alert and oriented to person, place, and time.      Coordination: Coordination normal.   Psychiatric:         Mood and Affect: Mood normal.         Behavior: Behavior normal.         Thought Content: Thought content normal.         Judgment: Judgment normal.         Assessment:         ICD-10-CM ICD-9-CM   1. Prostate cancer  C61 185   2. Benign non-nodular prostatic hyperplasia with lower urinary tract symptoms  N40.1 600.91   3. Essential hypertension  I10 401.9   4. Mixed hyperlipidemia  E78.2 272.2   5. Chronic anemia  D64.9 285.9   6. IFG (impaired fasting glucose)  R73.01 790.21   7. Cervical radiculopathy  M54.12 723.4   8. Overweight with body mass index (BMI) of 29 to 29.9 in adult  E66.3 278.02    Z68.29 V85.25   9. Thyroid disorder screen  Z13.29 V77.0   10. History of vitamin D deficiency  Z86.39 V12.1   11. Pernicious anemia  D51.0 281.0   12. Fatigue, unspecified type  R53.83 780.79       Plan:       Prostate cancer  Condition followed/treated by Specialist.  Please follow up with Specialist as advised.    Benign non-nodular prostatic hyperplasia with lower urinary tract symptoms  Condition followed/treated by Specialist.  Please follow up with Specialist as advised.    Essential hypertension  Continue current medication(s).  Follow up in 6 months.  -     amlodipine-valsartan (EXFORGE) 5-320 mg per tablet; Take 1 tablet by mouth once daily.  Dispense: 90 tablet; Refill: 1    Mixed hyperlipidemia  Continue current medication(s).  Follow up in 6 months.  -     rosuvastatin (CRESTOR) 10 MG tablet; Take 1 tablet (10 mg total) by mouth once daily.  Dispense: 90 tablet; Refill: 1  -     Lipid Panel; Future; Expected date: 12/19/2022    Chronic anemia  Get back on vitamins  -     CBC Auto Differential; Future; Expected date: 12/19/2022    IFG (impaired fasting glucose)  Cut out sugars in diet  -     Comprehensive Metabolic Panel; Future; Expected date: 12/19/2022  -     Hemoglobin A1C; Future; Expected date:  12/19/2022    Cervical radiculopathy  Condition followed/treated by Specialist.  Please follow up with Specialist as advised.    Overweight with body mass index (BMI) of 29 to 29.9 in adult    Thyroid disorder screen  -     TSH; Future; Expected date: 12/19/2022    History of vitamin D deficiency  -     Vitamin D 25 hydroxy; Future; Expected date: 12/19/2022    Pernicious anemia  -     VITAMIN B12; Future; Expected date: 12/19/2022    Fatigue, unspecified type  -     cholecalciferol, vitamin D3, (VITAMIN D3) 25 mcg (1,000 unit) capsule; Take 1 capsule (1,000 Units total) by mouth once daily.  Dispense: 90 capsule; Refill: 3  -     cyanocobalamin (VITAMIN B-12) 1000 MCG tablet; Take 1 tablet (1,000 mcg total) by mouth once daily.  Dispense: 90 tablet; Refill: 3      Follow up in about 6 months (around 6/19/2023) for fasting labs and WELLNESS EXAM.     Patient's Medications   New Prescriptions    No medications on file   Previous Medications    No medications on file   Modified Medications    Modified Medication Previous Medication    AMLODIPINE-VALSARTAN (EXFORGE) 5-320 MG PER TABLET amlodipine-valsartan (EXFORGE) 5-320 mg per tablet       Take 1 tablet by mouth once daily.    Take 1 tablet by mouth once daily.    CHOLECALCIFEROL, VITAMIN D3, (VITAMIN D3) 25 MCG (1,000 UNIT) CAPSULE cholecalciferol, vitamin D3, (VITAMIN D3) 25 mcg (1,000 unit) capsule       Take 1 capsule (1,000 Units total) by mouth once daily.    Take 1 capsule (1,000 Units total) by mouth once daily.    CYANOCOBALAMIN (VITAMIN B-12) 1000 MCG TABLET cyanocobalamin (VITAMIN B-12) 1000 MCG tablet       Take 1 tablet (1,000 mcg total) by mouth once daily.    Take 1 tablet (1,000 mcg total) by mouth once daily.    ROSUVASTATIN (CRESTOR) 10 MG TABLET rosuvastatin (CRESTOR) 10 MG tablet       Take 1 tablet (10 mg total) by mouth once daily.    Take 1 tablet (10 mg total) by mouth once daily.   Discontinued Medications    No medications on file       Past  Medical History:   Diagnosis Date    Benign non-nodular prostatic hyperplasia with lower urinary tract symptoms 07/19/2016    Followed by Dr. Arana    Cervical radiculopathy 3/7/2022    Follow[facility] by Dr. Cannon  [facility] - had epidural steroid on 3/23/22    Cervical spondylosis 2017    orthopedic - Dr. Guillaume MARTINEZ-19 virus detected 10/18/2020    [facility].    Elevated PSA, less than 10 ng/ml 5/9/2018    Essential hypertension 3/9/2018    GERD with esophagitis     EGD done 12/11/2018 with Dr. Mynor Montemayor    Pernicious anemia     per patient report    Prostate cancer 8/31/2018    prostate biopsy 5/14/2018 - no treatment required - being followed by [facility] Dr. Arana.    Urinary tract infection        Past Surgical History:   Procedure Laterality Date    COLONOSCOPY  05/24/2018    [facility] Dr. Mynor Montemayor--Polyps (6 mm) in ascending colon and hepatic flexure, Angioectasia in ascending colon, Internal Hemorrhoids.   Colonoscopy in 5 years     OTHER SURGICAL HISTORY      angiogram on wrist    PROSTATE BIOPSY  05/14/2018    done by Dr. Arana.    UPPER GASTROINTESTINAL ENDOSCOPY  12/11/2018    Dr. Montemayor - [facility] - + 7mm polyp in the cardia biopsied - hyperplastic polyp.  GERD with mild chronic esophagitis present.       Family History   Problem Relation Age of Onset    Cancer Paternal Grandmother     No Known Problems Mother     Heart disease Father         passed age75    Hypertension Father     Hyperlipidemia Father     No Known Problems Sister     No Known Problems Sister     No Known Problems Sister     Kidney disease Neg Hx     Prostate cancer Neg Hx        Social History     Socioeconomic History    Marital status: Single   Occupational History    Occupation: sales   Tobacco Use    Smoking status: Never    Smokeless tobacco: Never   Substance and Sexual Activity    Alcohol use: Yes     Comment: every other weekend - 2 drinks per occasion    Drug use: No     Sexual activity: Yes     Partners: Female

## 2022-12-19 NOTE — TELEPHONE ENCOUNTER
Dr. Arana    Patient of yours with prostate cancer that you are monitoring.    His appt with you is 1/5/23.    You last did PSA and testosterone levels in June but did not reorder labs or state what labs you wanted.C    Can you put in labs and have your staff call to schedule those labs before your appt on 1/5/23.    Thanks,  SULLY Laura

## 2023-01-05 ENCOUNTER — PATIENT MESSAGE (OUTPATIENT)
Dept: UROLOGY | Facility: CLINIC | Age: 63
End: 2023-01-05
Payer: COMMERCIAL

## 2023-01-06 ENCOUNTER — OFFICE VISIT (OUTPATIENT)
Dept: UROLOGY | Facility: CLINIC | Age: 63
End: 2023-01-06
Payer: COMMERCIAL

## 2023-01-06 VITALS
BODY MASS INDEX: 29.23 KG/M2 | DIASTOLIC BLOOD PRESSURE: 74 MMHG | SYSTOLIC BLOOD PRESSURE: 126 MMHG | HEART RATE: 70 BPM | HEIGHT: 71 IN | WEIGHT: 208.81 LBS

## 2023-01-06 DIAGNOSIS — R35.1 NOCTURIA: ICD-10-CM

## 2023-01-06 DIAGNOSIS — C61 PROSTATE CANCER: Primary | ICD-10-CM

## 2023-01-06 PROCEDURE — 3074F PR MOST RECENT SYSTOLIC BLOOD PRESSURE < 130 MM HG: ICD-10-PCS | Mod: CPTII,S$GLB,, | Performed by: UROLOGY

## 2023-01-06 PROCEDURE — 99999 PR PBB SHADOW E&M-EST. PATIENT-LVL III: CPT | Mod: PBBFAC,,, | Performed by: UROLOGY

## 2023-01-06 PROCEDURE — 1159F MED LIST DOCD IN RCRD: CPT | Mod: CPTII,S$GLB,, | Performed by: UROLOGY

## 2023-01-06 PROCEDURE — 3008F BODY MASS INDEX DOCD: CPT | Mod: CPTII,S$GLB,, | Performed by: UROLOGY

## 2023-01-06 PROCEDURE — 99214 OFFICE O/P EST MOD 30 MIN: CPT | Mod: S$GLB,,, | Performed by: UROLOGY

## 2023-01-06 PROCEDURE — 1160F PR REVIEW ALL MEDS BY PRESCRIBER/CLIN PHARMACIST DOCUMENTED: ICD-10-PCS | Mod: CPTII,S$GLB,, | Performed by: UROLOGY

## 2023-01-06 PROCEDURE — 3074F SYST BP LT 130 MM HG: CPT | Mod: CPTII,S$GLB,, | Performed by: UROLOGY

## 2023-01-06 PROCEDURE — 1160F RVW MEDS BY RX/DR IN RCRD: CPT | Mod: CPTII,S$GLB,, | Performed by: UROLOGY

## 2023-01-06 PROCEDURE — 99214 PR OFFICE/OUTPT VISIT, EST, LEVL IV, 30-39 MIN: ICD-10-PCS | Mod: S$GLB,,, | Performed by: UROLOGY

## 2023-01-06 PROCEDURE — 1159F PR MEDICATION LIST DOCUMENTED IN MEDICAL RECORD: ICD-10-PCS | Mod: CPTII,S$GLB,, | Performed by: UROLOGY

## 2023-01-06 PROCEDURE — 3078F DIAST BP <80 MM HG: CPT | Mod: CPTII,S$GLB,, | Performed by: UROLOGY

## 2023-01-06 PROCEDURE — 99999 PR PBB SHADOW E&M-EST. PATIENT-LVL III: ICD-10-PCS | Mod: PBBFAC,,, | Performed by: UROLOGY

## 2023-01-06 PROCEDURE — 3078F PR MOST RECENT DIASTOLIC BLOOD PRESSURE < 80 MM HG: ICD-10-PCS | Mod: CPTII,S$GLB,, | Performed by: UROLOGY

## 2023-01-06 PROCEDURE — 3008F PR BODY MASS INDEX (BMI) DOCUMENTED: ICD-10-PCS | Mod: CPTII,S$GLB,, | Performed by: UROLOGY

## 2023-01-06 RX ORDER — BENZONATATE 200 MG/1
CAPSULE ORAL
COMMUNITY
Start: 2022-12-30 | End: 2023-06-14

## 2023-01-06 NOTE — PROGRESS NOTES
Subjective:       Patient ID: Diallo Dawkins is a 62 y.o. male.    Chief Complaint: Annual Exam (Prostate cancer mgmt)    63 yo WM with history of very small amount of [place] 3+3=6 apical prostate cancer on the left side of the prostate in 2018..  The patient has been undergoing active surveillance where he was getting PSA test quarterly.  His PSA has remained relatively stable most recently this .98.  Patient has had some issues with low testosterone.  The testosterone has been in the 300s.  Most recently 324.  I have decided to check estrogen levels and determine if testosterone is being converted to estrogen.  If estrogen is elevated will placed on estrogen blockade and repeat testosterone levels every quarter with PSA test and estrogen level.  Will monitor patient and treat patient based on labs quarterly however we will have him follow-up yearly in the office unless we need to change anything.      Follow-up  Chronicity: Patient here for follow-up for history of small amount of [place] 3+3=6 prostate cancer in 2018.  Patient has been under active surveillance over the last 5 years.  His PSA has gone down and he has had no evidence of increasing disease within the prostate. The current episode started more than 1 year ago. The problem occurs constantly. The problem has been gradually improving. Pertinent negatives include no abdominal pain, anorexia, arthralgias, change in bowel habit, chest pain, chills, congestion, coughing, diaphoresis, fatigue, fever, headaches, joint swelling, myalgias, nausea, neck pain, numbness, rash, sore throat, swollen glands, urinary symptoms, vertigo, visual change, vomiting or weakness. Nothing aggravates the symptoms. Treatments tried: Active Surveillance. The treatment provided significant relief.   Review of Systems   Constitutional:  Negative for activity change, appetite change, chills, diaphoresis, fatigue, fever and unexpected weight change.   HENT:  Negative for  congestion, hearing loss, sinus pressure, sore throat and trouble swallowing.    Eyes:  Negative for photophobia, pain, discharge and visual disturbance.   Respiratory:  Negative for apnea, cough and shortness of breath.    Cardiovascular:  Negative for chest pain, palpitations and leg swelling.   Gastrointestinal:  Negative for abdominal distention, abdominal pain, anal bleeding, anorexia, blood in stool, change in bowel habit, constipation, diarrhea, nausea, rectal pain and vomiting.   Endocrine: Negative for cold intolerance, heat intolerance, polydipsia, polyphagia and polyuria.   Genitourinary:  Negative for decreased urine volume, difficulty urinating, dysuria, enuresis, flank pain, frequency, genital sores, hematuria, penile discharge, penile pain, penile swelling, scrotal swelling, testicular pain and urgency.   Musculoskeletal:  Negative for arthralgias, back pain, joint swelling, myalgias and neck pain.   Skin:  Negative for color change, pallor, rash and wound.   Allergic/Immunologic: Negative for environmental allergies, food allergies and immunocompromised state.   Neurological:  Negative for dizziness, vertigo, seizures, weakness, numbness and headaches.   Hematological:  Negative for adenopathy. Does not bruise/bleed easily.   Psychiatric/Behavioral: Negative.       Objective:      Physical Exam  Vitals and nursing note reviewed.   Constitutional:       Appearance: Normal appearance. He is well-developed.   HENT:      Head: Normocephalic.      Right Ear: External ear normal.      Left Ear: External ear normal.      Nose: Nose normal.      Mouth/Throat:      Pharynx: No oropharyngeal exudate or posterior oropharyngeal erythema.   Eyes:      General: No scleral icterus.        Right eye: No discharge.         Left eye: No discharge.      Extraocular Movements: Extraocular movements intact.      Conjunctiva/sclera: Conjunctivae normal.      Pupils: Pupils are equal, round, and reactive to light.    Cardiovascular:      Rate and Rhythm: Normal rate and regular rhythm.      Heart sounds: Normal heart sounds.   Pulmonary:      Effort: Pulmonary effort is normal.      Breath sounds: Normal breath sounds.   Abdominal:      General: Bowel sounds are normal.      Palpations: Abdomen is soft.      Tenderness: There is no right CVA tenderness or left CVA tenderness.   Genitourinary:     Penis: Normal.       Testes: Normal.   Musculoskeletal:         General: Normal range of motion.      Cervical back: Normal range of motion and neck supple.      Right lower leg: No edema.      Left lower leg: No edema.   Skin:     General: Skin is warm and dry.      Capillary Refill: Capillary refill takes less than 2 seconds.      Findings: No lesion or rash.   Neurological:      Mental Status: He is alert and oriented to person, place, and time.      Deep Tendon Reflexes: Reflexes are normal and symmetric.   Psychiatric:         Mood and Affect: Mood normal.         Behavior: Behavior normal.         Thought Content: Thought content normal.         Judgment: Judgment normal.       Assessment:       1. Prostate cancer    2. Nocturia        Plan:       Patient Instructions   Patient to get labs quarterly.  I will notify patient of results.  We will check testosterone, estrogen and PSA every 3 months.  Patient will follow-up yearly and as needed based on lab results.

## 2023-01-06 NOTE — PATIENT INSTRUCTIONS
Patient to get labs quarterly.  I will notify patient of results.  We will check testosterone, estrogen and PSA every 3 months.  Patient will follow-up yearly and as needed based on lab results.

## 2023-01-11 ENCOUNTER — PATIENT MESSAGE (OUTPATIENT)
Dept: UROLOGY | Facility: CLINIC | Age: 63
End: 2023-01-11
Payer: COMMERCIAL

## 2023-05-30 DIAGNOSIS — E78.2 MIXED HYPERLIPIDEMIA: ICD-10-CM

## 2023-05-30 DIAGNOSIS — I10 ESSENTIAL HYPERTENSION: ICD-10-CM

## 2023-05-30 RX ORDER — AMLODIPINE AND VALSARTAN 5; 320 MG/1; MG/1
TABLET ORAL
Qty: 90 TABLET | Refills: 0 | Status: SHIPPED | OUTPATIENT
Start: 2023-05-30 | End: 2023-08-28

## 2023-05-30 RX ORDER — ROSUVASTATIN CALCIUM 10 MG/1
TABLET, COATED ORAL
Qty: 90 TABLET | Refills: 0 | Status: SHIPPED | OUTPATIENT
Start: 2023-05-30 | End: 2023-08-28

## 2023-06-02 ENCOUNTER — PATIENT MESSAGE (OUTPATIENT)
Dept: FAMILY MEDICINE | Facility: CLINIC | Age: 63
End: 2023-06-02
Payer: COMMERCIAL

## 2023-06-09 ENCOUNTER — PATIENT MESSAGE (OUTPATIENT)
Dept: FAMILY MEDICINE | Facility: CLINIC | Age: 63
End: 2023-06-09
Payer: COMMERCIAL

## 2023-06-12 ENCOUNTER — PATIENT MESSAGE (OUTPATIENT)
Dept: UROLOGY | Facility: CLINIC | Age: 63
End: 2023-06-12
Payer: COMMERCIAL

## 2023-06-14 ENCOUNTER — OFFICE VISIT (OUTPATIENT)
Dept: FAMILY MEDICINE | Facility: CLINIC | Age: 63
End: 2023-06-14
Payer: COMMERCIAL

## 2023-06-14 VITALS
HEART RATE: 73 BPM | SYSTOLIC BLOOD PRESSURE: 120 MMHG | TEMPERATURE: 99 F | WEIGHT: 209.88 LBS | BODY MASS INDEX: 29.38 KG/M2 | DIASTOLIC BLOOD PRESSURE: 76 MMHG | OXYGEN SATURATION: 98 % | HEIGHT: 71 IN

## 2023-06-14 DIAGNOSIS — I10 ESSENTIAL HYPERTENSION: ICD-10-CM

## 2023-06-14 DIAGNOSIS — C61 PROSTATE CANCER: ICD-10-CM

## 2023-06-14 DIAGNOSIS — E66.3 OVERWEIGHT WITH BODY MASS INDEX (BMI) OF 29 TO 29.9 IN ADULT: ICD-10-CM

## 2023-06-14 DIAGNOSIS — Z00.00 ANNUAL PHYSICAL EXAM: Primary | ICD-10-CM

## 2023-06-14 DIAGNOSIS — D64.9 CHRONIC ANEMIA: ICD-10-CM

## 2023-06-14 DIAGNOSIS — M54.12 CERVICAL RADICULOPATHY: ICD-10-CM

## 2023-06-14 DIAGNOSIS — R73.01 IFG (IMPAIRED FASTING GLUCOSE): ICD-10-CM

## 2023-06-14 DIAGNOSIS — E78.2 MIXED HYPERLIPIDEMIA: ICD-10-CM

## 2023-06-14 DIAGNOSIS — N28.9 FUNCTION KIDNEY DECREASED: ICD-10-CM

## 2023-06-14 PROCEDURE — 99999 PR PBB SHADOW E&M-EST. PATIENT-LVL IV: ICD-10-PCS | Mod: PBBFAC,,, | Performed by: NURSE PRACTITIONER

## 2023-06-14 PROCEDURE — 1160F RVW MEDS BY RX/DR IN RCRD: CPT | Mod: CPTII,S$GLB,, | Performed by: NURSE PRACTITIONER

## 2023-06-14 PROCEDURE — 4010F ACE/ARB THERAPY RXD/TAKEN: CPT | Mod: CPTII,S$GLB,, | Performed by: NURSE PRACTITIONER

## 2023-06-14 PROCEDURE — 99999 PR PBB SHADOW E&M-EST. PATIENT-LVL IV: CPT | Mod: PBBFAC,,, | Performed by: NURSE PRACTITIONER

## 2023-06-14 PROCEDURE — 3008F PR BODY MASS INDEX (BMI) DOCUMENTED: ICD-10-PCS | Mod: CPTII,S$GLB,, | Performed by: NURSE PRACTITIONER

## 2023-06-14 PROCEDURE — 3074F SYST BP LT 130 MM HG: CPT | Mod: CPTII,S$GLB,, | Performed by: NURSE PRACTITIONER

## 2023-06-14 PROCEDURE — 3074F PR MOST RECENT SYSTOLIC BLOOD PRESSURE < 130 MM HG: ICD-10-PCS | Mod: CPTII,S$GLB,, | Performed by: NURSE PRACTITIONER

## 2023-06-14 PROCEDURE — 4010F PR ACE/ARB THEARPY RXD/TAKEN: ICD-10-PCS | Mod: CPTII,S$GLB,, | Performed by: NURSE PRACTITIONER

## 2023-06-14 PROCEDURE — 1160F PR REVIEW ALL MEDS BY PRESCRIBER/CLIN PHARMACIST DOCUMENTED: ICD-10-PCS | Mod: CPTII,S$GLB,, | Performed by: NURSE PRACTITIONER

## 2023-06-14 PROCEDURE — 99396 PR PREVENTIVE VISIT,EST,40-64: ICD-10-PCS | Mod: S$GLB,,, | Performed by: NURSE PRACTITIONER

## 2023-06-14 PROCEDURE — 1159F MED LIST DOCD IN RCRD: CPT | Mod: CPTII,S$GLB,, | Performed by: NURSE PRACTITIONER

## 2023-06-14 PROCEDURE — 3078F DIAST BP <80 MM HG: CPT | Mod: CPTII,S$GLB,, | Performed by: NURSE PRACTITIONER

## 2023-06-14 PROCEDURE — 99396 PREV VISIT EST AGE 40-64: CPT | Mod: S$GLB,,, | Performed by: NURSE PRACTITIONER

## 2023-06-14 PROCEDURE — 3008F BODY MASS INDEX DOCD: CPT | Mod: CPTII,S$GLB,, | Performed by: NURSE PRACTITIONER

## 2023-06-14 PROCEDURE — 3044F HG A1C LEVEL LT 7.0%: CPT | Mod: CPTII,S$GLB,, | Performed by: NURSE PRACTITIONER

## 2023-06-14 PROCEDURE — 3078F PR MOST RECENT DIASTOLIC BLOOD PRESSURE < 80 MM HG: ICD-10-PCS | Mod: CPTII,S$GLB,, | Performed by: NURSE PRACTITIONER

## 2023-06-14 PROCEDURE — 3044F PR MOST RECENT HEMOGLOBIN A1C LEVEL <7.0%: ICD-10-PCS | Mod: CPTII,S$GLB,, | Performed by: NURSE PRACTITIONER

## 2023-06-14 PROCEDURE — 1159F PR MEDICATION LIST DOCUMENTED IN MEDICAL RECORD: ICD-10-PCS | Mod: CPTII,S$GLB,, | Performed by: NURSE PRACTITIONER

## 2023-06-14 NOTE — PROGRESS NOTES
"Subjective:       Patient ID: Diallo Dawkins is a 62 y.o. male.    Chief Complaint: Annual Exam    HPI     Patient is a 62 year old white male with Hypertension, Hyperlipidemia, IFG, Prostate Cancer with BPH followed by Dr. Arana, mild pernicious anemia, cervical radiculopathy  and chronic GERD followed by Dr. Montemayor that is here today for Annual Physical exam with fasting lab results..     Hypertension  takes Amlodine -Valsartan 5- 320 mg daily  /76 (BP Location: Left arm, Patient Position: Sitting, BP Method: Large (Manual))   Pulse 73   Temp 98.6 °F (37 °C) (Temporal)   Ht 5' 11" (1.803 m)   Wt 95.2 kg (209 lb 14.1 oz)   SpO2 98%   BMI 29.27 kg/m²         Hyperlipidemia  takes Rosuvastatin 10 mg daily.   LDL 59.4     Prostate Cancer   diagnosed around May 2018 when had prostate biopsy done but has not required any treatment.    PSA is checked every 6 months by Dr. Arana along with BPH  Had an appointment with Dr. Arana 1/6/23. Continuing to monitor PSA & Testosterone level. Most recent estrogen level is pending.     Component      Latest Ref Rng & Units 6/12/2023 6/12/2023 1/4/2023           7:16 AM  7:16 AM    PSA Total      0.00 - 4.00 ng/mL  1.3 0.98   PSA, Free      0.00 - 1.50 ng/mL  0.96 0.73   PSA, Free %      Not established %  73.85 74.49   Estrogen      pg/mL   70   Testosterone, Total      304 - 1227 ng/dL 300 (L) 285 (L) 324        Chronic Anemia/Pernicious Anemia  History of Anemia  Anemia workup in 2018:  His iron and Ferritin levels were normal.  His Vitamin D level was normal but on low end of normal at 35.  His Vitamin B12 level was also normal at 386 but on the lower end of normal - recommended daily supplementation at April 2018 visit.  Patient has been taking his B12 but had stopped his Vitamin D. Advised to continue taking both medications.   Anemia is still present .  H&H - 13.4 & 37.8  Vit D - 37  Vitamin b12- 853     Impaired Fasting Glucose   with HgbA1C of " 5.1% at May 2018 visit - had advised on lifestyle modifications.    12/6/22 IFG  133 and HgbA1C 5.4%.   TODAY  and HgbA1C 5.3%  Admits to eating Oreo cookies often.   Continue to recommended stricter lifestyle modifications and will recheck A1C and CMP in 6m.     Cervical Radiculopathy  Followed by Dr. Cannon ([facility])  Had epidural injection to neck 3/23/22  Reports return of pain today and will be reaching out to Dr. Cannon.     Abnormal Kidney Function on Blood Test:   12/6/22 BUN 20; CRE 0.98; GFR >60.0  TODAY BUN 29 CRE 1.33; GFR 59.7  Will repeat BMP in 6 weeks.      Wellness Labs:  Stable chronic anemia  CMP with  and decreased renal function - will repeat lab in 6 weeks.  Cholesterol controlled on present medication  TSH WNL    Health Maintenance:  Colonoscopy scheduled with Dr. Montemayor in July 2023 - has to get cardiac clearance with Dr. Kaur prior to colonoscopy  Declined covid booster.      Component      Latest Ref Rng & Units 6/12/2023 6/12/2023 12/6/2022           7:16 AM  7:16 AM    WBC      3.90 - 12.70 K/uL  4.80 3.88 (L)   RBC      4.60 - 6.20 M/uL  4.15 (L) 4.25 (L)   Hemoglobin      14.0 - 18.0 g/dL  13.4 (L) 13.6 (L)   Hematocrit      40.0 - 54.0 %  37.8 (L) 37.4 (L)   MCV      82 - 98 fL  91 88   MCH      27.0 - 31.0 pg  32.3 (H) 32.0 (H)   MCHC      32.0 - 36.0 g/dL  35.4 36.4 (H)   RDW      11.5 - 14.5 %  14.8 (H) 13.6   Platelets      150 - 450 K/uL  194 167   MPV      9.2 - 12.9 fL  10.2 10.1   Immature Granulocytes      0.0 - 0.5 %  0.2 0.0   Gran # (ANC)      1.8 - 7.7 K/uL  2.7 2.1   Immature Grans (Abs)      0.00 - 0.04 K/uL  0.01 0.00   Lymph #      1.0 - 4.8 K/uL  1.5 1.2   Mono #      0.3 - 1.0 K/uL  0.5 0.4   Eos #      0.0 - 0.5 K/uL  0.1 0.1   Baso #      0.00 - 0.20 K/uL  0.02 0.02   nRBC      0 /100 WBC  0 0   Gran %      38.0 - 73.0 %  55.9 54.6   Lymph %      18.0 - 48.0 %  30.6 31.7   Mono %      4.0 - 15.0 %  10.4 10.1   Eosinophil %      0.0 - 8.0 %  2.5  3.1   Basophil %      0.0 - 1.9 %  0.4 0.5   Differential Method        Automated Automated   Sodium      136 - 145 mmol/L  143 141   Potassium      3.5 - 5.1 mmol/L  4.9 4.2   Chloride      95 - 110 mmol/L  105 104   CO2      23 - 29 mmol/L  24 27   Glucose      70 - 110 mg/dL  134 (H) 133 (H)   BUN      2 - 20 mg/dL  29 (H) 20   Creatinine      0.50 - 1.40 mg/dL  1.33 0.98   Calcium      8.7 - 10.5 mg/dL  9.2 8.8   PROTEIN TOTAL      6.0 - 8.4 g/dL  7.9 7.4   Albumin      3.5 - 5.2 g/dL  4.7 4.5   BILIRUBIN TOTAL      0.1 - 1.0 mg/dL  0.7 0.7   Alkaline Phosphatase      38 - 126 U/L  50 55   AST      15 - 46 U/L  31 27   ALT      10 - 44 U/L  32 25   Anion Gap      8 - 16 mmol/L  14 10   eGFR      >60 mL/min/1.73 m:2  59.7 (A) >60.0   Cholesterol      120 - 199 mg/dL  117 (L) 127   Triglycerides      30 - 150 mg/dL  113 110   HDL      40 - 75 mg/dL  35 (L) 34 (L)   LDL Cholesterol External      63.0 - 159.0 mg/dL  59.4 (L) 71.0   HDL/Cholesterol Ratio      20.0 - 50.0 %  29.9 26.8   Total Cholesterol/HDL Ratio      2.0 - 5.0  3.3 3.7   Non-HDL Cholesterol      mg/dL  82 93   Iron      45 - 160 ug/dL   107   Transferrin      200 - 375 mg/dL   236   TIBC      250 - 450 ug/dL   349   Saturated Iron      20 - 50 %   31   PSA Total      0.00 - 4.00 ng/mL  1.3    PSA, Free      0.00 - 1.50 ng/mL  0.96    PSA, Free %      Not established %  73.85    Hemoglobin A1C External      4.0 - 5.6 %  5.3 5.4   Estimated Avg Glucose      68 - 131 mg/dL  105 108   Vitamin B-12      210 - 950 pg/mL  853 648   Ferritin      20.0 - 300.0 ng/mL   136   HIV 1/2 Ag/Ab      Non-reactive   Non-reactive   TSH      0.400 - 4.000 uIU/mL  1.830    Vit D, 25-Hydroxy      30 - 96 ng/mL  37    Testosterone, Total      304 - 1227 ng/dL 300 (L) 285 (L)            Review of Systems   Constitutional:  Negative for activity change, appetite change, fatigue, fever and unexpected weight change.   HENT:  Negative for congestion, ear pain, mouth sores,  "nosebleeds, postnasal drip, rhinorrhea, sinus pressure, sneezing, sore throat, trouble swallowing and voice change.    Eyes: Negative.    Respiratory:  Negative for cough, chest tightness and shortness of breath.    Cardiovascular:  Negative for chest pain, palpitations and leg swelling.   Gastrointestinal: Negative.  Negative for abdominal pain, blood in stool, constipation, diarrhea, nausea and vomiting.   Endocrine: Negative.    Genitourinary:  Negative for difficulty urinating, dysuria, flank pain, hematuria and urgency.   Musculoskeletal:  Positive for myalgias and neck pain. Negative for arthralgias, back pain, gait problem and joint swelling.   Skin:  Negative for color change, rash and wound.   Allergic/Immunologic: Negative for immunocompromised state.   Neurological:  Negative for dizziness, tremors, seizures, syncope, speech difficulty and headaches.   Hematological:  Negative for adenopathy. Does not bruise/bleed easily.   Psychiatric/Behavioral:  Negative for behavioral problems, dysphoric mood, sleep disturbance and suicidal ideas. The patient is not nervous/anxious.        Objective:     Vitals:    06/14/23 1607   BP: 120/76   BP Location: Left arm   Patient Position: Sitting   BP Method: Large (Manual)   Pulse: 73   Temp: 98.6 °F (37 °C)   TempSrc: Temporal   SpO2: 98%   Weight: 95.2 kg (209 lb 14.1 oz)   Height: 5' 11" (1.803 m)          Physical Exam  Constitutional:       General: He is not in acute distress.     Appearance: He is well-developed. He is not ill-appearing, toxic-appearing or diaphoretic.      Comments: Body mass index is 29.27 kg/m².        HENT:      Head: Normocephalic and atraumatic.   Eyes:      General: No scleral icterus.        Right eye: No discharge.         Left eye: No discharge.      Extraocular Movements: Extraocular movements intact.      Conjunctiva/sclera: Conjunctivae normal.   Neck:      Thyroid: No thyromegaly.      Trachea: No tracheal deviation. "   Cardiovascular:      Rate and Rhythm: Normal rate and regular rhythm.      Heart sounds: Normal heart sounds. No murmur heard.  Pulmonary:      Effort: Pulmonary effort is normal. No respiratory distress.      Breath sounds: Normal breath sounds.   Abdominal:      General: Bowel sounds are normal. There is no distension.      Palpations: Abdomen is soft. There is no mass.      Tenderness: There is no abdominal tenderness. There is no guarding or rebound.      Hernia: No hernia is present.   Genitourinary:     Comments: Deferred to urologist  Musculoskeletal:         General: No swelling or deformity. Normal range of motion.      Cervical back: Normal range of motion and neck supple.      Right lower leg: No edema.      Left lower leg: No edema.   Lymphadenopathy:      Cervical: No cervical adenopathy.   Skin:     General: Skin is warm and dry.      Findings: No rash.   Neurological:      Mental Status: He is alert and oriented to person, place, and time.      Coordination: Coordination normal.   Psychiatric:         Mood and Affect: Mood normal.         Behavior: Behavior normal.         Thought Content: Thought content normal.         Judgment: Judgment normal.         Assessment:         ICD-10-CM ICD-9-CM   1. Annual physical exam  Z00.00 V70.0   2. Prostate cancer  C61 185   3. Essential hypertension  I10 401.9   4. Mixed hyperlipidemia  E78.2 272.2   5. IFG (impaired fasting glucose)  R73.01 790.21   6. Function kidney decreased  N28.9 593.9   7. Chronic anemia  D64.9 285.9   8. Cervical radiculopathy  M54.12 723.4   9. Overweight with body mass index (BMI) of 29 to 29.9 in adult  E66.3 278.02    Z68.29 V85.25       Plan:       Annual physical exam   Colonoscopy scheduled with Dr. Montemayor in July 2023 after cardiac clearance  Rest is up to date.  Continue current medication(s).  Follow up in 6 months.    Function kidney decreased  Advised to hydrate properly and avoid NSAIDS  Recheck kidney function in 6  weeks - will call with results.  -     BASIC METABOLIC PANEL; Future; Expected date: 06/14/2023      IFG (impaired fasting glucose)  -     Comprehensive Metabolic Panel; Future; Expected date: 06/14/2023  -     Hemoglobin A1C; Future; Expected date: 06/14/2023        Highly recommended stricter lifestyle modifications and will recheck A1C and CMP in 6m.      Essential hypertension  Continue current medication(s).  Follow up in 6 months.    Mixed hyperlipidemia  Continue current medication(s).  Follow up in 6 months.    Chronic anemia  **CONTINUE TAKING VIT D and B12 as prescribed.     Prostate cancer  Condition followed/treated by Specialist.  Please follow up with Specialist Dr. Arana as advised.    Cervical radiculopathy  Condition followed/treated by Specialist.  Please follow up with Specialist as advised.      Follow up in about 6 weeks (around 7/26/2023) for BMP bloodwork.  Then 6 months for routine follow up and additional labs (A1C and CMP) .     Patient's Medications   New Prescriptions    No medications on file   Previous Medications    AMLODIPINE-VALSARTAN (EXFORGE) 5-320 MG PER TABLET    TAKE 1 TABLET DAILY    CHOLECALCIFEROL, VITAMIN D3, (VITAMIN D3) 25 MCG (1,000 UNIT) CAPSULE    Take 1 capsule (1,000 Units total) by mouth once daily.    CYANOCOBALAMIN (VITAMIN B-12) 1000 MCG TABLET    Take 1 tablet (1,000 mcg total) by mouth once daily.    ROSUVASTATIN (CRESTOR) 10 MG TABLET    TAKE 1 TABLET DAILY   Modified Medications    No medications on file   Discontinued Medications    BENZONATATE (TESSALON) 200 MG CAPSULE           Past Medical History:   Diagnosis Date    Benign non-nodular prostatic hyperplasia with lower urinary tract symptoms 07/19/2016    Followed by Dr. Arana    Cervical radiculopathy 3/7/2022    [facility] Dr. [facility] - had epidural steroid on 3/23/22    Cervical spondylosis 2017    orthopedic - Dr. Galaviz    COVID-19 virus detected 10/18/2020    [facility]    Elevated PSA, less than 10 ng/ml 5/9/2018    Essential hypertension 3/9/2018    GERD with esophagitis     EGD done 12/11/2018 with Dr. Mynor Montemayor    Pernicious anemia     per patient report    Prostate cancer 8/31/2018    prostate biopsy 5/14/2018 - no treatment required - being followed by [facility] Dr. Arana.    Urinary tract infection        Past Surgical History:   Procedure Laterality Date    COLONOSCOPY  05/24/2018    [facility] Dr. Mynor Montemayor--Polyps (6 mm) in ascending colon and hepatic flexure, Angioectasia in ascending colon, Internal Hemorrhoids.   Colonoscopy in 5 years     OTHER SURGICAL HISTORY      angiogram on wrist    PROSTATE BIOPSY  05/14/2018    done by Dr. Arana.    UPPER GASTROINTESTINAL ENDOSCOPY  12/11/2018    Dr. Montemayor - [facility] - + 7mm polyp in the cardia biopsied - hyperplastic polyp.  GERD with mild chronic esophagitis present.       Family History   Problem Relation Age of Onset    Cancer Paternal Grandmother     No Known Problems Mother     Heart disease Father         passed age75    Hypertension Father     Hyperlipidemia Father     No Known Problems Sister     No Known Problems Sister     No Known Problems Sister     Kidney disease Neg Hx     Prostate cancer Neg Hx        Social History     Socioeconomic History    Marital status: Single   Occupational History    Occupation: sales   Tobacco Use    Smoking status: Never     Passive exposure: Never    Smokeless tobacco: Never   Substance and Sexual Activity    Alcohol use: Yes     Comment: every other weekend - 2 drinks per occasion    Drug use: No    Sexual activity: Yes     Partners: Female

## 2023-06-15 ENCOUNTER — PATIENT MESSAGE (OUTPATIENT)
Dept: UROLOGY | Facility: CLINIC | Age: 63
End: 2023-06-15
Payer: COMMERCIAL

## 2023-07-24 LAB — CRC RECOMMENDATION EXT: NORMAL

## 2023-08-28 DIAGNOSIS — E78.2 MIXED HYPERLIPIDEMIA: ICD-10-CM

## 2023-08-28 DIAGNOSIS — I10 ESSENTIAL HYPERTENSION: ICD-10-CM

## 2023-08-28 RX ORDER — ROSUVASTATIN CALCIUM 10 MG/1
TABLET, COATED ORAL
Qty: 90 TABLET | Refills: 3 | Status: SHIPPED | OUTPATIENT
Start: 2023-08-28 | End: 2024-03-21 | Stop reason: SDUPTHER

## 2023-08-28 RX ORDER — AMLODIPINE AND VALSARTAN 5; 320 MG/1; MG/1
TABLET ORAL
Qty: 90 TABLET | Refills: 3 | Status: SHIPPED | OUTPATIENT
Start: 2023-08-28 | End: 2024-03-21 | Stop reason: SDUPTHER

## 2023-11-27 DIAGNOSIS — R53.83 FATIGUE, UNSPECIFIED TYPE: ICD-10-CM

## 2023-11-27 RX ORDER — LANOLIN ALCOHOL/MO/W.PET/CERES
1000 CREAM (GRAM) TOPICAL
Qty: 90 TABLET | Refills: 3 | Status: SHIPPED | OUTPATIENT
Start: 2023-11-27 | End: 2023-12-13 | Stop reason: SDUPTHER

## 2023-12-13 ENCOUNTER — OFFICE VISIT (OUTPATIENT)
Dept: FAMILY MEDICINE | Facility: CLINIC | Age: 63
End: 2023-12-13
Payer: COMMERCIAL

## 2023-12-13 VITALS
HEIGHT: 71 IN | DIASTOLIC BLOOD PRESSURE: 80 MMHG | SYSTOLIC BLOOD PRESSURE: 114 MMHG | BODY MASS INDEX: 29.23 KG/M2 | HEART RATE: 80 BPM | WEIGHT: 208.75 LBS | OXYGEN SATURATION: 98 % | TEMPERATURE: 98 F

## 2023-12-13 DIAGNOSIS — Z86.39 HISTORY OF VITAMIN D DEFICIENCY: ICD-10-CM

## 2023-12-13 DIAGNOSIS — D51.0 PERNICIOUS ANEMIA: ICD-10-CM

## 2023-12-13 DIAGNOSIS — R53.83 FATIGUE, UNSPECIFIED TYPE: ICD-10-CM

## 2023-12-13 DIAGNOSIS — I10 ESSENTIAL HYPERTENSION: ICD-10-CM

## 2023-12-13 DIAGNOSIS — R73.01 IFG (IMPAIRED FASTING GLUCOSE): ICD-10-CM

## 2023-12-13 DIAGNOSIS — D64.9 CHRONIC ANEMIA: ICD-10-CM

## 2023-12-13 DIAGNOSIS — I20.89 STABLE ANGINA: Primary | ICD-10-CM

## 2023-12-13 DIAGNOSIS — M54.12 CERVICAL RADICULOPATHY: ICD-10-CM

## 2023-12-13 DIAGNOSIS — C61 PROSTATE CANCER: ICD-10-CM

## 2023-12-13 DIAGNOSIS — E78.2 MIXED HYPERLIPIDEMIA: ICD-10-CM

## 2023-12-13 DIAGNOSIS — M54.31 RIGHT SIDED SCIATICA: ICD-10-CM

## 2023-12-13 PROBLEM — R07.9 INTERMITTENT CHEST PAIN: Status: ACTIVE | Noted: 2023-12-13

## 2023-12-13 PROCEDURE — 4010F ACE/ARB THERAPY RXD/TAKEN: CPT | Mod: CPTII,S$GLB,, | Performed by: NURSE PRACTITIONER

## 2023-12-13 PROCEDURE — 3044F PR MOST RECENT HEMOGLOBIN A1C LEVEL <7.0%: ICD-10-PCS | Mod: CPTII,S$GLB,, | Performed by: NURSE PRACTITIONER

## 2023-12-13 PROCEDURE — 99215 PR OFFICE/OUTPT VISIT, EST, LEVL V, 40-54 MIN: ICD-10-PCS | Mod: S$GLB,,, | Performed by: NURSE PRACTITIONER

## 2023-12-13 PROCEDURE — 1159F PR MEDICATION LIST DOCUMENTED IN MEDICAL RECORD: ICD-10-PCS | Mod: CPTII,S$GLB,, | Performed by: NURSE PRACTITIONER

## 2023-12-13 PROCEDURE — 1101F PR PT FALLS ASSESS DOC 0-1 FALLS W/OUT INJ PAST YR: ICD-10-PCS | Mod: CPTII,S$GLB,, | Performed by: NURSE PRACTITIONER

## 2023-12-13 PROCEDURE — 3079F PR MOST RECENT DIASTOLIC BLOOD PRESSURE 80-89 MM HG: ICD-10-PCS | Mod: CPTII,S$GLB,, | Performed by: NURSE PRACTITIONER

## 2023-12-13 PROCEDURE — 1159F MED LIST DOCD IN RCRD: CPT | Mod: CPTII,S$GLB,, | Performed by: NURSE PRACTITIONER

## 2023-12-13 PROCEDURE — 99999 PR PBB SHADOW E&M-EST. PATIENT-LVL IV: ICD-10-PCS | Mod: PBBFAC,,, | Performed by: NURSE PRACTITIONER

## 2023-12-13 PROCEDURE — 3008F BODY MASS INDEX DOCD: CPT | Mod: CPTII,S$GLB,, | Performed by: NURSE PRACTITIONER

## 2023-12-13 PROCEDURE — 99215 OFFICE O/P EST HI 40 MIN: CPT | Mod: S$GLB,,, | Performed by: NURSE PRACTITIONER

## 2023-12-13 PROCEDURE — 3079F DIAST BP 80-89 MM HG: CPT | Mod: CPTII,S$GLB,, | Performed by: NURSE PRACTITIONER

## 2023-12-13 PROCEDURE — 3008F PR BODY MASS INDEX (BMI) DOCUMENTED: ICD-10-PCS | Mod: CPTII,S$GLB,, | Performed by: NURSE PRACTITIONER

## 2023-12-13 PROCEDURE — 1160F PR REVIEW ALL MEDS BY PRESCRIBER/CLIN PHARMACIST DOCUMENTED: ICD-10-PCS | Mod: CPTII,S$GLB,, | Performed by: NURSE PRACTITIONER

## 2023-12-13 PROCEDURE — 3044F HG A1C LEVEL LT 7.0%: CPT | Mod: CPTII,S$GLB,, | Performed by: NURSE PRACTITIONER

## 2023-12-13 PROCEDURE — 4010F PR ACE/ARB THEARPY RXD/TAKEN: ICD-10-PCS | Mod: CPTII,S$GLB,, | Performed by: NURSE PRACTITIONER

## 2023-12-13 PROCEDURE — 1160F RVW MEDS BY RX/DR IN RCRD: CPT | Mod: CPTII,S$GLB,, | Performed by: NURSE PRACTITIONER

## 2023-12-13 PROCEDURE — 3288F PR FALLS RISK ASSESSMENT DOCUMENTED: ICD-10-PCS | Mod: CPTII,S$GLB,, | Performed by: NURSE PRACTITIONER

## 2023-12-13 PROCEDURE — 3288F FALL RISK ASSESSMENT DOCD: CPT | Mod: CPTII,S$GLB,, | Performed by: NURSE PRACTITIONER

## 2023-12-13 PROCEDURE — 99999 PR PBB SHADOW E&M-EST. PATIENT-LVL IV: CPT | Mod: PBBFAC,,, | Performed by: NURSE PRACTITIONER

## 2023-12-13 PROCEDURE — 3074F PR MOST RECENT SYSTOLIC BLOOD PRESSURE < 130 MM HG: ICD-10-PCS | Mod: CPTII,S$GLB,, | Performed by: NURSE PRACTITIONER

## 2023-12-13 PROCEDURE — 1101F PT FALLS ASSESS-DOCD LE1/YR: CPT | Mod: CPTII,S$GLB,, | Performed by: NURSE PRACTITIONER

## 2023-12-13 PROCEDURE — 3074F SYST BP LT 130 MM HG: CPT | Mod: CPTII,S$GLB,, | Performed by: NURSE PRACTITIONER

## 2023-12-13 RX ORDER — LANOLIN ALCOHOL/MO/W.PET/CERES
1000 CREAM (GRAM) TOPICAL DAILY
Qty: 90 TABLET | Refills: 3 | Status: SHIPPED | OUTPATIENT
Start: 2023-12-13

## 2023-12-13 RX ORDER — VIT C/E/ZN/COPPR/LUTEIN/ZEAXAN 250MG-90MG
1000 CAPSULE ORAL DAILY
Qty: 90 CAPSULE | Refills: 3 | Status: SHIPPED | OUTPATIENT
Start: 2023-12-13

## 2023-12-13 NOTE — PROGRESS NOTES
"Subjective:       Patient ID: Diallo Dawkins is a 63 y.o. male.    Chief Complaint: Follow-up (6 months F/U) and Sciatica (Right side - been seeing NP and PT at [place] work)    HPI    Patient is a 62 year old white male with Stable Angina evaluated by Cardiology, Hypertension, Hyperlipidemia, IFG, Prostate Cancer with BPH followed by Dr. Arana, mild pernicious anemia, cervical radiculopathy  and chronic GERD followed by Dr. Montemayor that is here today for 6 month follow up with fasting lab results. Patient also has complaint of RIGHT SCIATICA being treated at work.     Hypertension  takes Amlodine -Valsartan 5- 320 mg daily  /80 (BP Location: Left arm, Patient Position: Sitting, BP Method: Large (Manual))   Pulse 80   Temp 97.7 °F (36.5 °C) (Temporal)   Ht 5' 11" (1.803 m)   Wt 94.7 kg (208 lb 12.4 oz)   SpO2 98%   BMI 29.12 kg/m²        Hyperlipidemia  takes Rosuvastatin 10 mg daily.   LDL 59.4 in June 2023 - monitored yearly         Intermittent Chest Pain/stable angina  Reported intermittent chest pain and excessive fatigue with activity; HOOPER  Went to see Dr. Jeferson Kaur, [facility] Cardiologist on 8/21/2023  EKG, Nuclear Stress test and CT Calcium Score Completed - unremarkable  Nuclear Stress Test 7/17/2023:  Impression  1. Small to moderate area of stress-induced ischemia in the anterolateral wall.  Addendum by Omar Low MD on 07/17/2023  4:48 PM CDT   #### ADDENDUM #1 #####  Upon further review, there is no appreciable reversible perfusion defect.   The slight variation in intensity of activity in the anterolateral wall is   likely artifactual. There is no evidence for ischemia on this exam.   Findings are reviewed with Dr. Kaur.   CT CALCIUM SCORE 7/17/2023:  There is minimal eccentric calcified plaque in the proximal left anterior descending artery. There is minimal calcified plaque at the origin of the right coronary artery. No additional calcified plaque is present. The Agatston " score is 84.5. This places the patient in the 41st percentile for age and gender.   Per Dr. Kaur Cardiology progress notes 8/21/2023:  10 year risk 8.8 %. Patient's blood pressure is typically well controlled with systolic blood pressure in the 120's to 130's. Patient's lipids are at or near goal. I encouraged the patient to continue a low cholesterol diet and take their medications.  Exercise  RTC 1 year         Prostate Cancer   diagnosed around May 2018 when had prostate biopsy done but has not required any treatment.    PSA is checked every 6 months by Dr. Arana along with BPH  Had an appointment with Dr. Arana 1/6/23.           Chronic Anemia/Family report of Pernicious Anemia  History of Chronic Anemia  Anemia workup in 2018:  His iron and Ferritin levels were normal.    His Vitamin D level was normal but on low end of normal at 35.    His Vitamin B12 level was also normal at 386 but on the lower end of normal - recommended daily supplementation at April 2018 visit.  Patient has been taking his B12 but had stopped his Vitamin D. Advised to continue taking both medications.   Anemia is still present .  H&H - 13.4 & 37.8  Vit D - 37  Vitamin b12- 853            Impaired Fasting Glucose   with HgbA1C of 5.1% at May 2018 visit   See updated levels below.  Continue to recommended stricter lifestyle modifications   Recheck A1C and CMP at next blood draw.           Cervical Radiculopathy  Followed by Dr. Cannon ([facility])  Had epidural injection to neck 3/23/22  Followed by Dr. Cannon.        Sciatica of Right Side  Started with right buttock pain radiating to right posterior thigh around 4 to 5 months ago  Reports seen [facility] NP around November 2023 and diagnosed with Sciatic  Was put on Ibuprofen 600 mg prn and sent to PT  Has been doing PT with group at [facility] for past 2 weeks.  Advised to continue NSAID and PT  Gave handout on some home stretches  If not improved - either work can  "send him to specialist or I can refer to Pain Management to further treat.      Review of Systems   Cardiovascular:  Positive for chest pain.        Intermittent - evaluated by cardiologist.   Musculoskeletal:  Positive for arthralgias, back pain and myalgias.         Objective:     Vitals:    12/13/23 1604   BP: 114/80   BP Location: Left arm   Patient Position: Sitting   BP Method: Large (Manual)   Pulse: 80   Temp: 97.7 °F (36.5 °C)   TempSrc: Temporal   SpO2: 98%   Weight: 94.7 kg (208 lb 12.4 oz)   Height: 5' 11" (1.803 m)          Physical Exam  Constitutional:       General: He is not in acute distress.     Appearance: He is well-developed. He is not ill-appearing, toxic-appearing or diaphoretic.      Comments: Body mass index is 29.12 kg/m².         HENT:      Head: Normocephalic and atraumatic.   Eyes:      General: No scleral icterus.        Right eye: No discharge.         Left eye: No discharge.      Extraocular Movements: Extraocular movements intact.      Conjunctiva/sclera: Conjunctivae normal.   Neck:      Thyroid: No thyromegaly.      Trachea: No tracheal deviation.   Cardiovascular:      Rate and Rhythm: Normal rate and regular rhythm.      Heart sounds: Normal heart sounds. No murmur heard.  Pulmonary:      Effort: Pulmonary effort is normal. No respiratory distress.      Breath sounds: Normal breath sounds.   Abdominal:      General: There is no distension.   Genitourinary:     Comments: Deferred to urologist  Musculoskeletal:         General: Normal range of motion.      Cervical back: Normal range of motion.   Skin:     General: Skin is warm and dry.      Findings: No rash.   Neurological:      Mental Status: He is alert and oriented to person, place, and time.   Psychiatric:         Mood and Affect: Mood normal.         Behavior: Behavior normal.         Thought Content: Thought content normal.         Judgment: Judgment normal.           Assessment:         ICD-10-CM ICD-9-CM   1. Stable " angina  I20.89 413.9   2. Fatigue, unspecified type  R53.83 780.79   3. Prostate cancer  C61 185   4. Essential hypertension  I10 401.9   5. Mixed hyperlipidemia  E78.2 272.2   6. IFG (impaired fasting glucose)  R73.01 790.21   7. Pernicious anemia  D51.0 281.0   8. BMI 29.0-29.9,adult  Z68.29 V85.25   9. History of vitamin D deficiency  Z86.39 V12.1   10. Chronic anemia  D64.9 285.9   11. Cervical radiculopathy  M54.12 723.4   12. Right sided sciatica  M54.31 724.3       Plan:       1. Stable angina  Overview:  Reported intermittent chest pain and excessive fatigue with activity; HOOPER  Went to see Dr. Jeferson Kaur, [facility] Cardiologist on 8/21/2023  EKG, Nuclear Stress test and CT Calcium Score Completed - unremarkable  Nuclear Stress Test 7/17/2023:  Impression  1. Small to moderate area of stress-induced ischemia in the anterolateral wall.  Addendum by Omar Low MD on 07/17/2023  4:48 PM CDT   #### ADDENDUM #1 #####  Upon further review, there is no appreciable reversible perfusion defect.   The slight variation in intensity of activity in the anterolateral wall is   likely artifactual. There is no evidence for ischemia on this exam.   Findings are reviewed with Dr. Kaur.   CT CALCIUM SCORE 7/17/2023:  There is minimal eccentric calcified plaque in the proximal left anterior descending artery. There is minimal calcified plaque at the origin of the right coronary artery. No additional calcified plaque is present. The Agatston score is 84.5. This places the patient in the 41st percentile for age and gender.   Per Dr. Kaur Cardiology progress notes 8/21/2023:  10 year risk 8.8 %. Patient's blood pressure is typically well controlled with systolic blood pressure in the 120's to 130's. Patient's lipids are at or near goal. I encouraged the patient to continue a low cholesterol diet and take their medications.  Exercise  RTC 1 year       2. Fatigue, unspecified type  -     cholecalciferol, vitamin D3, (VITAMIN  "D3) 25 mcg (1,000 unit) capsule; Take 1 capsule (1,000 Units total) by mouth once daily.  Dispense: 90 capsule; Refill: 3  -     cyanocobalamin (VITAMIN B-12) 1000 MCG tablet; Take 1 tablet (1,000 mcg total) by mouth once daily.  Dispense: 90 tablet; Refill: 3    3. Prostate cancer  Overview:  diagnosed around May 2018 when had prostate biopsy done but has not required any treatment.    PSA is checked every 6 months by Dr. Arana along with BPH  Had an appointment with Dr. Arana 1/6/23.         4. Essential hypertension  Overview:  takes Amlodine -Valsartan 5- 320 mg daily  /80 (BP Location: Left arm, Patient Position: Sitting, BP Method: Large (Manual))   Pulse 80   Temp 97.7 °F (36.5 °C) (Temporal)   Ht 5' 11" (1.803 m)   Wt 94.7 kg (208 lb 12.4 oz)   SpO2 98%   BMI 29.12 kg/m²     Orders:  -     TSH; Future; Expected date: 12/13/2023    5. Mixed hyperlipidemia  Overview:  takes Rosuvastatin 10 mg daily.   LDL 59.4 in June 2023 - monitored yearly      Orders:  -     Lipid Panel; Future; Expected date: 12/13/2023    6. IFG (impaired fasting glucose)  Overview:   with HgbA1C of 5.1% at May 2018 visit   See updated levels below.  Continue to recommended stricter lifestyle modifications   Recheck A1C and CMP at next blood draw.      Orders:  -     Comprehensive Metabolic Panel; Future; Expected date: 12/13/2023  -     Hemoglobin A1C; Future; Expected date: 12/13/2023    7. Pernicious anemia  Overview:  History of Chronic Anemia  Anemia workup in 2018:  His iron and Ferritin levels were normal.    His Vitamin D level was normal but on low end of normal at 35.    His Vitamin B12 level was also normal at 386 but on the lower end of normal - recommended daily supplementation at April 2018 visit.  Patient has been taking his B12 but had stopped his Vitamin D. Advised to continue taking both medications.   Anemia is still present .  H&H - 13.4 & 37.8  Vit D - 37  Vitamin b12- 853         Orders:  -     " VITAMIN B12; Future; Expected date: 12/13/2023    8. BMI 29.0-29.9,adult  -     CBC Auto Differential; Future; Expected date: 12/13/2023    9. History of vitamin D deficiency  -     Vitamin D 25 hydroxy; Future; Expected date: 12/13/2023    10. Chronic anemia  Overview:  History of Chronic Anemia  Anemia workup in 2018:  His iron and Ferritin levels were normal.    His Vitamin D level was normal but on low end of normal at 35.    His Vitamin B12 level was also normal at 386 but on the lower end of normal - recommended daily supplementation at April 2018 visit.  Patient has been taking his B12 but had stopped his Vitamin D. Advised to continue taking both medications.   Anemia is still present .  H&H - 13.4 & 37.8  Vit D - 37  Vitamin b12- 853           11. Cervical radiculopathy  Overview:  Followed by Dr. Cannon ([facility])  Had epidural injection to neck 3/23/22  Followed by Dr. Cannon.       12. Right sided sciatica  Overview:  Started with right buttock pain radiating to right posterior thigh around 4 to 5 months ago  Reports seen [facility] NP around November 2023 and diagnosed with Sciatic  Was put on Ibuprofen 600 mg prn and sent to PT  Has been doing PT with group at [facility] for past 2 weeks.  Advised to continue NSAID and PT  Gave handout on some home stretches  If not improved - either work can send him to specialist or I can refer to Pain Management to further treat.         I spent a total of 47 minutes on the day of the visit.  This includes face to face time and non-face to face time preparing to see the patient (eg, review of tests), obtaining and/or reviewing separately obtained history, documenting clinical information in the electronic or other health record, independently interpreting results and communicating results to the patient/family/caregiver, or care coordinator.       Follow up in about 2 months (around 2/13/2024) for fasting labs and INITIAL MEDICARE WELLNESS EXAM.      Patient's Medications   New Prescriptions    No medications on file   Previous Medications    AMLODIPINE-VALSARTAN (EXFORGE) 5-320 MG PER TABLET    TAKE 1 TABLET DAILY    ROSUVASTATIN (CRESTOR) 10 MG TABLET    TAKE 1 TABLET DAILY   Modified Medications    Modified Medication Previous Medication    CHOLECALCIFEROL, VITAMIN D3, (VITAMIN D3) 25 MCG (1,000 UNIT) CAPSULE cholecalciferol, vitamin D3, (VITAMIN D3) 25 mcg (1,000 unit) capsule       Take 1 capsule (1,000 Units total) by mouth once daily.    Take 1 capsule (1,000 Units total) by mouth once daily.    CYANOCOBALAMIN (VITAMIN B-12) 1000 MCG TABLET cyanocobalamin (VITAMIN B-12) 1000 MCG tablet       Take 1 tablet (1,000 mcg total) by mouth once daily.    TAKE 1 TABLET ONCE DAILY   Discontinued Medications    No medications on file       Past Medical History:   Diagnosis Date    Benign non-nodular prostatic hyperplasia with lower urinary tract symptoms 07/19/2016    Followed by Dr. Arana    Cervical radiculopathy 03/07/2022    Followed by Dr. Cannon - [facility] - had epidural steroid on 3/23/22    Cervical spondylosis 2017    orthopedic - Dr. Galaviz    COVID-19 virus detected 10/18/2020    [facility].    Elevated PSA, less than 10 ng/ml 05/09/2018    Essential hypertension 03/09/2018    GERD with esophagitis     EGD done 12/11/2018 with Dr. Mynor Montemayor    Intermittent chest pain 12/13/2023    Reported intermittent chest pain and excessive fatigue with activity; HOOPER  Went to see Dr. Jeferson Kaur, [facility] Cardiologist on 8/21/2023  EKG, Nuclear Stress test and CT Calcium Score Completed - unremarkable  Nuclear Stress Test 7/17/2023:  Impression  1. Small to moderate area of stress-induced ischemia in the anterolateral wall.  Addendum by Omar Low MD on 07/17/2023  4:48 PM CDT   ####    Pernicious anemia     per patient report    Prostate cancer 08/31/2018    prostate biopsy 5/14/2018 - no treatment required - being followed  by [facility] Dr. Arana.    Urinary tract infection        Past Surgical History:   Procedure Laterality Date    COLONOSCOPY  05/24/2018    [facility] Dr. Mynor Montemayor--Polyps (6 mm) in ascending colon and hepatic flexure, Angioectasia in ascending colon, Internal Hemorrhoids.   Colonoscopy in 5 years     OTHER SURGICAL HISTORY      angiogram on wrist    PROSTATE BIOPSY  05/14/2018    done by Dr. Arana.    UPPER GASTROINTESTINAL ENDOSCOPY  12/11/2018    Dr. Montemayor - [facility] - + 7mm polyp in the cardia biopsied - hyperplastic polyp.  GERD with mild chronic esophagitis present.       Family History   Problem Relation Age of Onset    Cancer Paternal Grandmother     No Known Problems Mother     Heart disease Father         passed age75    Hypertension Father     Hyperlipidemia Father     No Known Problems Sister     No Known Problems Sister     No Known Problems Sister     Kidney disease Neg Hx     Prostate cancer Neg Hx        Social History     Socioeconomic History    Marital status: Single   Occupational History    Occupation: sales   Tobacco Use    Smoking status: Never     Passive exposure: Never    Smokeless tobacco: Never   Substance and Sexual Activity    Alcohol use: Yes     Comment: every other weekend - 2 drinks per occasion    Drug use: No    Sexual activity: Yes     Partners: Female

## 2023-12-29 ENCOUNTER — PATIENT MESSAGE (OUTPATIENT)
Dept: UROLOGY | Facility: CLINIC | Age: 63
End: 2023-12-29
Payer: COMMERCIAL

## 2024-01-04 ENCOUNTER — PATIENT MESSAGE (OUTPATIENT)
Dept: UROLOGY | Facility: CLINIC | Age: 64
End: 2024-01-04
Payer: MEDICARE

## 2024-01-05 ENCOUNTER — OFFICE VISIT (OUTPATIENT)
Dept: UROLOGY | Facility: CLINIC | Age: 64
End: 2024-01-05
Payer: MEDICARE

## 2024-01-05 VITALS
DIASTOLIC BLOOD PRESSURE: 84 MMHG | SYSTOLIC BLOOD PRESSURE: 135 MMHG | HEART RATE: 88 BPM | BODY MASS INDEX: 29.12 KG/M2 | HEIGHT: 71 IN

## 2024-01-05 DIAGNOSIS — R35.1 NOCTURIA: ICD-10-CM

## 2024-01-05 DIAGNOSIS — N40.1 BENIGN NON-NODULAR PROSTATIC HYPERPLASIA WITH LOWER URINARY TRACT SYMPTOMS: Primary | ICD-10-CM

## 2024-01-05 DIAGNOSIS — C61 PROSTATE CANCER: ICD-10-CM

## 2024-01-05 PROCEDURE — 99214 OFFICE O/P EST MOD 30 MIN: CPT | Mod: S$GLB,,, | Performed by: UROLOGY

## 2024-01-05 PROCEDURE — 99999 PR PBB SHADOW E&M-EST. PATIENT-LVL III: CPT | Mod: PBBFAC,,, | Performed by: UROLOGY

## 2024-01-05 NOTE — PROGRESS NOTES
Subjective:      Patient ID: Diallo Dawkins is a 63 y.o. male.    Chief Complaint: Annual Exam    Mr. Dawkins is a 63-year-old gentleman with a history of BPH and prostate cancer.  The patient was found have a small amount of prostate cancer on biopsy.  The patient had a Frankenmuth 3+3=6 in 1% of 1 of 4 biopsies from the left apex.  The patient has not had any findings on MRI consistent with any other disease within the prostate.  His Oncotype DX score was at 30% with which put him at quite low risk.  The patient agreed to undergo active surveillance we have been monitoring PSA at 3 months and now at a six-month level.  The plan if PSA changes or indicates change in disease state to undergo MRI of the prostate again prior to any further biopsy.  The patient is doing quite well and is following up consistently as scheduled.  Most recent PSA was 0.5.  Testosterone was 344 in his estrogen level is pending.    Follow-up  This is a chronic (Prostate Cancer) problem. The problem occurs constantly. The problem has been gradually improving. Pertinent negatives include no abdominal pain, anorexia, arthralgias, change in bowel habit, chest pain, chills, congestion, coughing, diaphoresis, fatigue, fever, headaches, joint swelling, myalgias, nausea, neck pain, numbness, rash, sore throat, swollen glands, urinary symptoms, vertigo, visual change, vomiting or weakness. Nothing aggravates the symptoms. He has tried nothing for the symptoms. The treatment provided significant relief.     Review of Systems   Constitutional:  Negative for activity change, appetite change, chills, diaphoresis, fatigue, fever and unexpected weight change.   HENT:  Negative for congestion, hearing loss, sinus pressure, sore throat and trouble swallowing.    Eyes:  Negative for photophobia, pain, discharge and visual disturbance.   Respiratory:  Negative for apnea, cough and shortness of breath.    Cardiovascular:  Negative for chest pain, palpitations  and leg swelling.   Gastrointestinal:  Negative for abdominal distention, abdominal pain, anal bleeding, anorexia, blood in stool, change in bowel habit, constipation, diarrhea, nausea, rectal pain and vomiting.   Endocrine: Negative for cold intolerance, heat intolerance, polydipsia, polyphagia and polyuria.   Genitourinary:  Negative for decreased urine volume, difficulty urinating, dysuria, enuresis, flank pain, frequency, genital sores, hematuria, penile discharge, penile pain, penile swelling, scrotal swelling, testicular pain and urgency.   Musculoskeletal:  Negative for arthralgias, back pain, joint swelling, myalgias and neck pain.   Skin:  Negative for color change, pallor, rash and wound.   Allergic/Immunologic: Negative for environmental allergies, food allergies and immunocompromised state.   Neurological:  Negative for dizziness, vertigo, seizures, weakness, numbness and headaches.   Hematological:  Negative for adenopathy. Does not bruise/bleed easily.   Psychiatric/Behavioral: Negative.        Objective:     Physical Exam  Vitals and nursing note reviewed.   Constitutional:       General: He is not in acute distress.     Appearance: Normal appearance. He is well-developed. He is not ill-appearing, toxic-appearing or diaphoretic.   HENT:      Head: Normocephalic and atraumatic.      Right Ear: External ear normal.      Left Ear: External ear normal.      Nose: Nose normal.      Mouth/Throat:      Mouth: Mucous membranes are moist.      Pharynx: Oropharynx is clear. No oropharyngeal exudate or posterior oropharyngeal erythema.   Eyes:      General: No scleral icterus.        Right eye: No discharge.         Left eye: No discharge.      Extraocular Movements: Extraocular movements intact.      Conjunctiva/sclera: Conjunctivae normal.      Pupils: Pupils are equal, round, and reactive to light.   Cardiovascular:      Rate and Rhythm: Normal rate and regular rhythm.      Pulses: Normal pulses.      Heart  sounds: Normal heart sounds.   Pulmonary:      Effort: Pulmonary effort is normal.      Breath sounds: Rales (bilateral) present.   Abdominal:      General: Bowel sounds are normal. There is no distension.      Palpations: Abdomen is soft. There is no mass.      Tenderness: There is no abdominal tenderness. There is no right CVA tenderness, left CVA tenderness, guarding or rebound.      Hernia: No hernia is present.   Genitourinary:     Penis: Normal.       Testes: Normal.      Comments: Patient with no CVA tenderness, normal penis and scrotum.  Bladder nontender.  Musculoskeletal:         General: Normal range of motion.      Cervical back: Normal range of motion and neck supple.      Right lower leg: No edema.      Left lower leg: No edema.   Skin:     General: Skin is warm and dry.      Capillary Refill: Capillary refill takes less than 2 seconds.      Findings: No lesion or rash.   Neurological:      General: No focal deficit present.      Mental Status: He is alert and oriented to person, place, and time.      Deep Tendon Reflexes: Reflexes are normal and symmetric.   Psychiatric:         Mood and Affect: Mood normal.         Behavior: Behavior normal.         Thought Content: Thought content normal.         Judgment: Judgment normal.        Assessment:      1. Benign non-nodular prostatic hyperplasia with lower urinary tract symptoms    2. Prostate cancer    3. Nocturia      Plan:     Patient Instructions   Patient to keep follow-up in 6 months with repeat testosterone, PSA and estrogen level.  Will notify patient by MyChart when estrogen level returns from this recent blood draw.

## 2024-01-05 NOTE — PATIENT INSTRUCTIONS
Patient to keep follow-up in 6 months with repeat testosterone, PSA and estrogen level.  [facility] estrogen level returns from this recent blood draw.

## 2024-01-09 ENCOUNTER — PATIENT MESSAGE (OUTPATIENT)
Dept: UROLOGY | Facility: CLINIC | Age: 64
End: 2024-01-09
Payer: MEDICARE

## 2024-02-09 ENCOUNTER — OFFICE VISIT (OUTPATIENT)
Dept: URGENT CARE | Facility: CLINIC | Age: 64
End: 2024-02-09
Payer: MEDICARE

## 2024-02-09 ENCOUNTER — TELEPHONE (OUTPATIENT)
Dept: URGENT CARE | Facility: CLINIC | Age: 64
End: 2024-02-09

## 2024-02-09 VITALS
RESPIRATION RATE: 17 BRPM | HEART RATE: 89 BPM | OXYGEN SATURATION: 98 % | BODY MASS INDEX: 27.77 KG/M2 | SYSTOLIC BLOOD PRESSURE: 135 MMHG | HEIGHT: 72 IN | DIASTOLIC BLOOD PRESSURE: 75 MMHG | TEMPERATURE: 99 F | WEIGHT: 205 LBS

## 2024-02-09 DIAGNOSIS — U07.1 COVID: Primary | ICD-10-CM

## 2024-02-09 DIAGNOSIS — J02.9 SORE THROAT: ICD-10-CM

## 2024-02-09 DIAGNOSIS — U07.1 COVID-19 VIRUS DETECTED: ICD-10-CM

## 2024-02-09 DIAGNOSIS — R05.9 COUGH, UNSPECIFIED TYPE: ICD-10-CM

## 2024-02-09 LAB
CTP QC/QA: YES
CTP QC/QA: YES
POC MOLECULAR INFLUENZA A AGN: NEGATIVE
POC MOLECULAR INFLUENZA B AGN: NEGATIVE
SARS-COV-2 AG RESP QL IA.RAPID: POSITIVE

## 2024-02-09 PROCEDURE — 87502 INFLUENZA DNA AMP PROBE: CPT | Mod: QW,S$GLB,, | Performed by: PHYSICIAN ASSISTANT

## 2024-02-09 PROCEDURE — 99214 OFFICE O/P EST MOD 30 MIN: CPT | Mod: S$GLB,,, | Performed by: PHYSICIAN ASSISTANT

## 2024-02-09 PROCEDURE — 87811 SARS-COV-2 COVID19 W/OPTIC: CPT | Mod: QW,S$GLB,, | Performed by: PHYSICIAN ASSISTANT

## 2024-02-09 NOTE — LETTER
February 9, 2024      [facility]  [address], [address]  [place] [address]  Phone: [phone]  Fax: [phone]       Patient: Diallo Dawkins   YOB: 1960  Date of Visit: 02/09/2024    To Whom It May Concern:    Michelle Dawkins  was at [facility] on 02/09/2024. The patient may return to work/school on FEBRUARY 14, 2024 WITH NO restrictions. If you have any questions or concerns, or if I can be of further assistance, please do not hesitate to contact me.    Sincerely,    James Goodson PA

## 2024-02-09 NOTE — PROGRESS NOTES
Subjective:      Patient ID: Diallo Dawkins is a 63 y.o. male.    Vitals:  height is 6' (1.829 m) and weight is 93 kg (205 lb). His oral temperature is 98.6 °F (37 °C). His blood pressure is 135/75 and his pulse is 89. His respiration is 17 and oxygen saturation is 98%.     Chief Complaint: Cough (Body aches - Entered by patient)    Patient presents with dry cough, sore throat with body aches and headache . Patient denies fever . Onset one day ago. No other symptoms reported. Patient reports taking nyquil and tessalon pearls with mild relief.  SYMPTOMS STARTED YESTERDAY.    Cough  This is a new problem. The current episode started yesterday. The problem has been unchanged. The problem occurs hourly. The cough is Non-productive. Associated symptoms include headaches, myalgias and a sore throat. Pertinent negatives include no ear pain, nasal congestion or rash. Nothing aggravates the symptoms. Treatments tried: tessalon pearls  ,nyquil. The treatment provided mild relief. There is no history of asthma, bronchitis or pneumonia.       HENT:  Positive for sore throat. Negative for ear pain.    Respiratory:  Positive for cough.    Musculoskeletal:  Positive for muscle ache.   Skin:  Negative for rash and erythema.   Neurological:  Positive for headaches.      Objective:     Physical Exam   Constitutional: He is oriented to person, place, and time. He appears well-developed. He is cooperative.  Non-toxic appearance. He does not appear ill. No distress.   HENT:   Head: Normocephalic and atraumatic.   Ears:   Right Ear: Hearing, tympanic membrane, external ear and ear canal normal.   Left Ear: Hearing, tympanic membrane, external ear and ear canal normal.   Nose: Nose normal. No mucosal edema, rhinorrhea, nasal deformity or congestion. No epistaxis. Right sinus exhibits no maxillary sinus tenderness and no frontal sinus tenderness. Left sinus exhibits no maxillary sinus tenderness and no frontal sinus tenderness.    Mouth/Throat: Uvula is midline, oropharynx is clear and moist and mucous membranes are normal. No trismus in the jaw. Normal dentition. No uvula swelling. No oropharyngeal exudate, posterior oropharyngeal edema or posterior oropharyngeal erythema.   Eyes: Conjunctivae, EOM and lids are normal. Pupils are equal, round, and reactive to light. Right eye exhibits no discharge. Left eye exhibits no discharge. No scleral icterus.   Neck: Trachea normal and phonation normal. Neck supple. No JVD present. No tracheal deviation present. No thyromegaly present. No edema present. No erythema present. No neck rigidity present.   Cardiovascular: Normal rate, regular rhythm, normal heart sounds and normal pulses.   No murmur heard.Exam reveals no gallop and no friction rub.   Pulmonary/Chest: Effort normal and breath sounds normal. No stridor. No respiratory distress. He has no decreased breath sounds. He has no wheezes. He has no rhonchi. He has no rales. He exhibits no tenderness.   Abdominal: Normal appearance. He exhibits no distension. Soft. There is no abdominal tenderness. There is no rebound and no guarding.   Musculoskeletal: Normal range of motion.         General: No deformity. Normal range of motion.   Neurological: He is alert and oriented to person, place, and time. He exhibits normal muscle tone. Coordination normal.   Skin: Skin is warm, dry, intact, not diaphoretic, not pale and no rash. Capillary refill takes less than 2 seconds. No erythema   Psychiatric: His speech is normal and behavior is normal. Judgment and thought content normal.   Nursing note and vitals reviewed.    Results for orders placed or performed in visit on 02/09/24   POCT Influenza A/B MOLECULAR   Result Value Ref Range    POC Molecular Influenza A Ag Negative Negative, Not Reported    POC Molecular Influenza B Ag Negative Negative, Not Reported     Acceptable Yes    SARS Coronavirus 2 Antigen, POCT Manual Read   Result Value  Ref Range    SARS Coronavirus 2 Antigen Positive (A) Negative     Acceptable Yes     No results found.     Assessment:     1. COVID    2. Cough, unspecified type    3. Sore throat        Plan:       COVID  -     nirmatrelvir-ritonavir 300 mg (150 mg x 2)-100 mg copackaged tablets (EUA); Take 3 tablets by mouth 2 (two) times daily for 5 days. Each dose contains 2 nirmatrelvir (pink tablets) and 1 ritonavir (white tablet). Take all 3 tablets together  Dispense: 30 tablet; Refill: 0    Cough, unspecified type  -     POCT Influenza A/B MOLECULAR    Sore throat  -     SARS Coronavirus 2 Antigen, POCT Manual Read      Follow up if symptoms worsen or fail to improve, for F/U with PCP or ED.   Patient Instructions   Instructions for Patients with Confirmed or Suspected COVID-19  HOLD YOUR CHOLESTEROL PILL ON THE DAYS YOUR TAKE HIM PAXLOVID IF YOU DECIDE TO TAKE PAXLOVID    If you are awaiting your test result, you will either be called or it will be released to the patient portal.  If you have any questions about your test, please visit [email or web address] or call our COVID-19 information line at [phone].      Please isolate yourself at home.  You may leave home and/or return to work once the following conditions are met:    If you have symptoms and tested positive:  More than 5 days since symptoms first appeared AND  More than 24 hours fever free without medications AND       symptoms have improved   For five days after ending isolation, masks are required.    If you had no symptoms but tested positive:  More than 5 days since the date of the first positive test. If you develop symptoms, then use the guidelines above  For five days after ending isolation, masks are required.      Testing is not recommended if you are symptom free after completing isolation.

## 2024-02-09 NOTE — TELEPHONE ENCOUNTER
Called patient back, went to voicemail but voicemail was full. Rx already sent to [facility]    ----- Message from Katelin Gimenez sent at 2/9/2024  3:27 PM CST -----  Regarding: Pharmacy call  Prescription Alternative Needed:     The pharmacy needs alternative on the following RX:    nirmatrelvir-ritonavir 300 mg (150 mg x 2)-100 mg copackaged tablets (EUA)    Reason: Drug not available.    Preferred alternative: Pharmacy only have the 20 pack, not the 30     Pharmacy:   [facility]/pharmacy #[id number] - [place] - [address] AT [facility] OF [facility]  [address]  [address]  [place] [address]  Phone: [phone] Fax: [phone]    Please advise.    Thank You

## 2024-02-09 NOTE — PATIENT INSTRUCTIONS
Instructions for Patients with Confirmed or Suspected COVID-19  HOLD YOUR CHOLESTEROL PILL ON THE DAYS YOUR TAKE HIM PAXLOVID IF YOU DECIDE TO TAKE PAXLOVID    If you are awaiting your test result, you will either be called or it will be released to the patient portal.  If you have any questions about your test, please visit [email or web address] or call our COVID-19 information line at [phone].      Please isolate yourself at home.  You may leave home and/or return to work once the following conditions are met:    If you have symptoms and tested positive:  More than 5 days since symptoms first appeared AND  More than 24 hours fever free without medications AND       symptoms have improved   For five days after ending isolation, masks are required.    If you had no symptoms but tested positive:  More than 5 days since the date of the first positive test. If you develop symptoms, then use the guidelines above  For five days after ending isolation, masks are required.      Testing is not recommended if you are symptom free after completing isolation.

## 2024-03-21 ENCOUNTER — OFFICE VISIT (OUTPATIENT)
Dept: FAMILY MEDICINE | Facility: CLINIC | Age: 64
End: 2024-03-21
Payer: MEDICARE

## 2024-03-21 VITALS
BODY MASS INDEX: 29.94 KG/M2 | TEMPERATURE: 98 F | SYSTOLIC BLOOD PRESSURE: 124 MMHG | OXYGEN SATURATION: 99 % | DIASTOLIC BLOOD PRESSURE: 82 MMHG | WEIGHT: 209.13 LBS | HEART RATE: 78 BPM | HEIGHT: 70 IN

## 2024-03-21 DIAGNOSIS — I20.89 STABLE ANGINA: ICD-10-CM

## 2024-03-21 DIAGNOSIS — C61 PROSTATE CANCER: ICD-10-CM

## 2024-03-21 DIAGNOSIS — R73.01 IFG (IMPAIRED FASTING GLUCOSE): ICD-10-CM

## 2024-03-21 DIAGNOSIS — D64.9 CHRONIC ANEMIA: ICD-10-CM

## 2024-03-21 DIAGNOSIS — M54.12 CERVICAL RADICULOPATHY: ICD-10-CM

## 2024-03-21 DIAGNOSIS — I10 ESSENTIAL HYPERTENSION: ICD-10-CM

## 2024-03-21 DIAGNOSIS — Z86.010 PERSONAL HISTORY OF COLONIC POLYPS: ICD-10-CM

## 2024-03-21 DIAGNOSIS — Z00.00 MEDICARE ANNUAL WELLNESS VISIT, INITIAL: Primary | ICD-10-CM

## 2024-03-21 DIAGNOSIS — E78.2 MIXED HYPERLIPIDEMIA: ICD-10-CM

## 2024-03-21 DIAGNOSIS — E66.09 CLASS 1 OBESITY DUE TO EXCESS CALORIES WITH SERIOUS COMORBIDITY AND BODY MASS INDEX (BMI) OF 30.0 TO 30.9 IN ADULT: ICD-10-CM

## 2024-03-21 PROBLEM — E66.811 CLASS 1 OBESITY DUE TO EXCESS CALORIES WITH SERIOUS COMORBIDITY AND BODY MASS INDEX (BMI) OF 30.0 TO 30.9 IN ADULT: Status: ACTIVE | Noted: 2023-06-14

## 2024-03-21 PROBLEM — Z86.0100 PERSONAL HISTORY OF COLONIC POLYPS: Status: ACTIVE | Noted: 2024-03-21

## 2024-03-21 PROBLEM — M54.31 RIGHT SIDED SCIATICA: Status: RESOLVED | Noted: 2023-12-13 | Resolved: 2024-03-21

## 2024-03-21 PROCEDURE — 99999 PR PBB SHADOW E&M-EST. PATIENT-LVL III: CPT | Mod: PBBFAC,,, | Performed by: NURSE PRACTITIONER

## 2024-03-21 PROCEDURE — G0402 INITIAL PREVENTIVE EXAM: HCPCS | Mod: S$GLB,,, | Performed by: NURSE PRACTITIONER

## 2024-03-21 RX ORDER — ROSUVASTATIN CALCIUM 10 MG/1
10 TABLET, COATED ORAL DAILY
Qty: 90 TABLET | Refills: 1 | Status: SHIPPED | OUTPATIENT
Start: 2024-03-21 | End: 2024-06-04 | Stop reason: SDUPTHER

## 2024-03-21 RX ORDER — AMLODIPINE AND VALSARTAN 5; 320 MG/1; MG/1
1 TABLET ORAL DAILY
Qty: 90 TABLET | Refills: 1 | Status: SHIPPED | OUTPATIENT
Start: 2024-03-21 | End: 2024-06-04 | Stop reason: SDUPTHER

## 2024-03-21 NOTE — PROGRESS NOTES
"Subjective:       Patient ID: Diallo Dawkins is a 63 y.o. male.    Chief Complaint: Medicare AWV (Intial Medicare Wellness)    HPI    THIS IS A MEDICARE ANNUAL PHYSICAL EXAM SO HEALTH RISK ASSESSMENT, DEPRESSION SCREENING, AND ADL/FUNCTIONAL ABILITY CHECKLIST REVIEWED - SEE FLOW SHEETS.  ALL PAST MEDICAL, SURGICAL AND FAMILY HISTORY REVIEWED - SEE BELOW.  ALL CHRONIC PROBLEMS EVALUATED.       Medicare AWV:  HRA completed:  See Health Risk Assessment flowsheet  1. PMH and Family history reviewed and updated  2.  Updated list of current providers  Team Member Role and Specialty Contact Info Address Start End Comments   PCPs         Keya Castro NP General (Family Medicine) Phone: [phone]  Fax: [phone] [address] [facility]  [address]  [place] [address] 5/30/2019 - -   Additional Team Members         Maxx Cannon MD (Physical Medicine and Rehabilitation) Phone: [phone]  Fax: [phone] [address] [place], [place] [address]  [id number]  [place] [address] 3/21/2024 - -   Bekah Perdue MA Care Coordinator - - 6/3/2020 - -   Maxx Arana MD Consulting Physician (Urology) Phone: [phone]  Fax: [phone] [address] [facility]  [address]  [place] [address] 5/7/2019 - -   Jeferson Kaur MD Consulting Physician (Cardiology) Phone: [phone]  Fax: [phone] [address] [facility]  [place] [address] 3/21/2024 - -     3.  Updated measures/vital signs completed and stable  /82 (BP Location: Left arm, Patient Position: Sitting, BP Method: Large (Manual))   Pulse 78   Temp 97.9 °F (36.6 °C) (Temporal)   Ht 5' 10" (1.778 m)   Wt 94.8 kg (209 lb 1.7 oz)   SpO2 99%   BMI 30.00 kg/m²   4. Cognitive function evaluated and intact. See clock test below.  5.  Depression screening is negative. See flowsheete.  6.  Patient is independent and able to function and perform all ADLs.  7.  Health Maintenance schedule per USPSTF reviewed and updated - schedule reviewed with patient - seen under POC below.  8.  " "Risk factor review: no depression, no cognitive impairments  9.  All preventative counseling reviewed and personalized health advice given.    10.  Advanced care planning discussed with handouts given for patient to review.  He will take forms home to complete  11.  Patient is on NO opioids  12.  Patient has NO substance use.                Patient is a 63 year old white male with Stable Angina evaluated by Cardiology, Hypertension, Hyperlipidemia, IFG, Prostate Cancer with BPH followed by Dr. Arana, mild pernicious anemia, cervical radiculopathy  and chronic GERD followed by Dr. Montemayor that is here today for INITIAL MEDICARE AWV with fasting lab results.        Hypertension  takes Amlodine -Valsartan 5- 320 mg daily  Blood pressure is stable.  /82 (BP Location: Left arm, Patient Position: Sitting, BP Method: Large (Manual))   Pulse 78   Temp 97.9 °F (36.6 °C) (Temporal)   Ht 5' 10" (1.778 m)   Wt 94.8 kg (209 lb 1.7 oz)   SpO2 99%   BMI 30.00 kg/m²         Hyperlipidemia  takes Rosuvastatin 10 mg daily.   LDL 85.8 in Feb 2023 - monitored yearly  Stable - stay on medication  Recheck in 6 months - if still > 70 = will increase dose.          Intermittent Chest Pain/stable angina  Reported intermittent chest pain and excessive fatigue with activity; HOOPER  Went to see Dr. Jeferson Kaur, [facility] Cardiologist on 8/21/2023  EKG, Nuclear Stress test and CT Calcium Score Completed - unremarkable  Nuclear Stress Test 7/17/2023:  Impression  1. Small to moderate area of stress-induced ischemia in the anterolateral wall.  Addendum by Omar Low MD on 07/17/2023  4:48 PM CDT   #### ADDENDUM #1 #####  Upon further review, there is no appreciable reversible perfusion defect.   The slight variation in intensity of activity in the anterolateral wall is   likely artifactual. There is no evidence for ischemia on this exam.   Findings are reviewed with Dr. Kaur.   CT CALCIUM SCORE 7/17/2023:  There is minimal " eccentric calcified plaque in the proximal left anterior descending artery. There is minimal calcified plaque at the origin of the right coronary artery. No additional calcified plaque is present. The Agatston score is 84.5. This places the patient in the 41st percentile for age and gender.   Per Dr. Kaur Cardiology progress notes 8/21/2023:  10 year risk 8.8 %. Patient's blood pressure is typically well controlled with systolic blood pressure in the 120's to 130's. Patient's lipids are at or near goal. I encouraged the patient to continue a low cholesterol diet and take their medications.  Exercise  RTC 1 year            Prostate Cancer   diagnosed around May 2018 when had prostate biopsy done but has not required any treatment.    PSA is checked every 6 months by Dr. Arana along with BPH  Had an appointment with Dr. Arana 1/5/2024              Chronic Anemia/Family report of Pernicious Anemia  History of Chronic Anemia  Anemia workup in 2018:  His iron and Ferritin levels were normal.    His Vitamin D level was normal but on low end of normal at 35.    His Vitamin B12 level was also normal at 386 but on the lower end of normal - recommended daily supplementation at April 2018 visit.  Patient has been taking his B12 and Vitamin D supplements.  Anemia is STABLE  H&H - 13.4 & 37.2  Vit D - 39  Vitamin [id number] - can cut dose down             Impaired Fasting Glucose   with HgbA1C of 5.1% at May 2018 visit   See updated levels below.  Continue to recommended stricter lifestyle modifications   Recheck A1C and CMP in 6 months.         Obesity  Body mass index is 30 kg/m².  Working on lifestyle modifications       Cervical Radiculopathy  Followed by Dr. Cannon ([facility] group)  Had epidural injection to neck 3/23/22  Followed by Dr. Cannon.         History of Sciatica of Right Side  In 2023 - Started with right buttock pain radiating to right posterior thigh around 4 to 5 months ago  Reports seen Leevia  NP around November 2023 and diagnosed with Sciatic  Was put on Ibuprofen 600 mg prn and sent to PT  Has been doing PT with group at [facility] for past 2 weeks.  Advised to continue NSAID and PT  Gave handout on some home stretches  Reports this is now RESOLVED      Personal History of Colon Polyps  Followed by GI MD Montemayor  Last colonoscopy 7/24/2023 - + polyp (requesting record)  Repeat in 5 years.        Wellness labs:  CBC with chronic anemia stable  CMP with IFG otherwise normal  Cholesterol discussed above  TSH WNL  HgbA1C 5.6%    Health Maintenance:  PSA and colonoscopy up to date.    No visits with results within 30 Day(s) from this visit.   Latest known visit with results is:   Lab Visit on 02/16/2024   Component Date Value Ref Range Status    WBC 02/16/2024 4.69  3.90 - 12.70 K/uL Final    RBC 02/16/2024 4.19 (L)  4.60 - 6.20 M/uL Final    Hemoglobin 02/16/2024 13.4 (L)  14.0 - 18.0 g/dL Final    Hematocrit 02/16/2024 37.2 (L)  40.0 - 54.0 % Final    MCV 02/16/2024 89  82 - 98 fL Final    MCH 02/16/2024 32.0 (H)  27.0 - 31.0 pg Final    MCHC 02/16/2024 36.0  32.0 - 36.0 g/dL Final    RDW 02/16/2024 13.3  11.5 - 14.5 % Final    Platelets 02/16/2024 208  150 - 450 K/uL Final    MPV 02/16/2024 9.5  9.2 - 12.9 fL Final    Immature Granulocytes 02/16/2024 0.2  0.0 - 0.5 % Final    Gran # (ANC) 02/16/2024 2.5  1.8 - 7.7 K/uL Final    Immature Grans (Abs) 02/16/2024 0.01  0.00 - 0.04 K/uL Final    Comment: Mild elevation in immature granulocytes is non specific and   can be seen in a variety of conditions including stress response,   acute inflammation, trauma and pregnancy. Correlation with other   laboratory and clinical findings is essential.      Lymph # 02/16/2024 1.7  1.0 - 4.8 K/uL Final    Mono # 02/16/2024 0.4  0.3 - 1.0 K/uL Final    Eos # 02/16/2024 0.1  0.0 - 0.5 K/uL Final    Baso # 02/16/2024 0.02  0.00 - 0.20 K/uL Final    nRBC 02/16/2024 0  0 /100 WBC Final    Gran % 02/16/2024 53.1  38.0 -  73.0 % Final    Lymph % 02/16/2024 36.5  18.0 - 48.0 % Final    Mono % 02/16/2024 7.7  4.0 - 15.0 % Final    Eosinophil % 02/16/2024 2.1  0.0 - 8.0 % Final    Basophil % 02/16/2024 0.4  0.0 - 1.9 % Final    Differential Method 02/16/2024 Automated   Final    Sodium 02/16/2024 145  136 - 145 mmol/L Final    Potassium 02/16/2024 5.1  3.5 - 5.1 mmol/L Final    Chloride 02/16/2024 109  95 - 110 mmol/L Final    CO2 02/16/2024 29  23 - 29 mmol/L Final    Glucose 02/16/2024 120 (H)  70 - 110 mg/dL Final    BUN 02/16/2024 21 (H)  2 - 20 mg/dL Final    Creatinine 02/16/2024 1.09  0.50 - 1.40 mg/dL Final    Calcium 02/16/2024 9.2  8.7 - 10.5 mg/dL Final    Total Protein 02/16/2024 7.8  6.0 - 8.4 g/dL Final    Albumin 02/16/2024 4.5  3.5 - 5.2 g/dL Final    Total Bilirubin 02/16/2024 0.4  0.1 - 1.0 mg/dL Final    Comment: For infants and newborns, interpretation of results should be based  on gestational age, weight and in agreement with clinical  observations.    Premature Infant recommended reference ranges:  Up to 24 hours.............<8.0 mg/dL  Up to 48 hours............<12.0 mg/dL  3-5 days..................<15.0 mg/dL  6-29 days.................<15.0 mg/dL      Alkaline Phosphatase 02/16/2024 48  38 - 126 U/L Final    AST 02/16/2024 34  15 - 46 U/L Final    ALT 02/16/2024 31  10 - 44 U/L Final    Anion Gap 02/16/2024 7 (L)  8 - 16 mmol/L Final    eGFR 02/16/2024 >60.0  >60 mL/min/1.73 m^2 Final    Hemoglobin A1C 02/16/2024 5.6  4.0 - 5.6 % Final    Comment: ADA Screening Guidelines:  5.7-6.4%  Consistent with prediabetes  >or=6.5%  Consistent with diabetes    High levels of fetal hemoglobin interfere with the HbA1C  assay. Heterozygous hemoglobin variants (HbS, HgC, etc)do  not significantly interfere with this assay.   However, presence of multiple variants may affect accuracy.      Estimated Avg Glucose 02/16/2024 114  68 - 131 mg/dL Final    Cholesterol 02/16/2024 138  120 - 199 mg/dL Final    Comment: The National  Cholesterol Education Program (NCEP) has set the  following guidelines (reference ranges) for Cholesterol:  Optimal.....................<200 mg/dL  Borderline High.............200-239 mg/dL  High........................> or = 240 mg/dL      Triglycerides 02/16/2024 111  30 - 150 mg/dL Final    Comment: The National Cholesterol Education Program (NCEP) has set the  following guidelines (reference values) for triglycerides:  Normal......................<150 mg/dL  Borderline High.............150-199 mg/dL  High........................200-499 mg/dL      HDL 02/16/2024 30 (L)  40 - 75 mg/dL Final    Comment: The National Cholesterol Education Program (NCEP) has set the  following guidelines (reference values) for HDL Cholesterol:  Low...............<40 mg/dL  Optimal...........>60 mg/dL      LDL Cholesterol 02/16/2024 85.8  63.0 - 159.0 mg/dL Final    Comment: The National Cholesterol Education Program (NCEP) has set the  following guidelines (reference values) for LDL Cholesterol:  Optimal.......................<130 mg/dL  Borderline High...............130-159 mg/dL  High..........................160-189 mg/dL  Very High.....................>190 mg/dL      HDL/Cholesterol Ratio 02/16/2024 21.7  20.0 - 50.0 % Final    Total Cholesterol/HDL Ratio 02/16/2024 4.6  2.0 - 5.0 Final    Non-HDL Cholesterol 02/16/2024 108  mg/dL Final    Comment: Risk category and Non-HDL cholesterol goals:  Coronary heart disease (CHD)or equivalent (10-year risk of CHD >20%):  Non-HDL cholesterol goal     <130 mg/dL  Two or more CHD risk factors and 10-year risk of CHD <= 20%:  Non-HDL cholesterol goal     <160 mg/dL  0 to 1 CHD risk factor:  Non-HDL cholesterol goal     <190 mg/dL      TSH 02/16/2024 2.510  0.400 - 4.000 uIU/mL Final    Comment: Warning:  Heterophilic antibodies in serum or plasma of   certain individuals are known to cause interference with   immunoassays. These antibodies may be present in blood samples   from individuals  "regularly exposed to animal or who have been   treated with animal products.     Patients taking high doses of supplemental biotin may have  negatively biased results.       Vitamin B-12 02/16/2024 1348 (H)  210 - 950 pg/mL Final    Vit D, 25-Hydroxy 02/16/2024 39  30 - 96 ng/mL Final    Comment: Vitamin D deficiency.........<10 ng/mL                              Vitamin D insufficiency......10-29 ng/mL       Vitamin D sufficiency........> or equal to 30 ng/mL  Vitamin D toxicity............>100 ng/mL            Review of Systems   HENT: Negative.     Respiratory: Negative.     Cardiovascular: Negative.    Gastrointestinal: Negative.    Musculoskeletal:  Positive for myalgias and neck pain.         Objective:     Vitals:    03/21/24 0914   BP: 124/82   BP Location: Left arm   Patient Position: Sitting   BP Method: Large (Manual)   Pulse: 78   Temp: 97.9 °F (36.6 °C)   TempSrc: Temporal   SpO2: 99%   Weight: 94.8 kg (209 lb 1.7 oz)   Height: 5' 10" (1.778 m)          Physical Exam  Constitutional:       General: He is not in acute distress.     Appearance: He is well-developed. He is not ill-appearing, toxic-appearing or diaphoretic.      Comments: Body mass index is 30 kg/m².           HENT:      Head: Normocephalic and atraumatic.   Eyes:      General: No scleral icterus.        Right eye: No discharge.         Left eye: No discharge.      Extraocular Movements: Extraocular movements intact.      Conjunctiva/sclera: Conjunctivae normal.   Neck:      Thyroid: No thyromegaly.      Trachea: No tracheal deviation.   Cardiovascular:      Rate and Rhythm: Normal rate and regular rhythm.      Heart sounds: Normal heart sounds. No murmur heard.  Pulmonary:      Effort: Pulmonary effort is normal. No respiratory distress.      Breath sounds: Normal breath sounds.   Abdominal:      General: There is no distension.   Genitourinary:     Comments: Deferred to urologist  Musculoskeletal:         General: Normal range of motion. "      Cervical back: Normal range of motion.   Skin:     General: Skin is warm and dry.      Findings: No rash.   Neurological:      Mental Status: He is alert and oriented to person, place, and time.   Psychiatric:         Mood and Affect: Mood normal.         Behavior: Behavior normal.         Thought Content: Thought content normal.         Judgment: Judgment normal.           Assessment:         ICD-10-CM ICD-9-CM   1. Medicare annual wellness visit, initial  Z00.00 V70.0   2. Mixed hyperlipidemia  E78.2 272.2   3. Essential hypertension  I10 401.9   4. Stable angina  I20.89 413.9   5. IFG (impaired fasting glucose)  R73.01 790.21   6. Prostate cancer  C61 185   7. Chronic anemia  D64.9 285.9   8. Cervical radiculopathy  M54.12 723.4   9. Personal history of colonic polyps  Z86.010 V12.72   10. Class 1 obesity due to excess calories with serious comorbidity and body mass index (BMI) of 30.0 to 30.9 in adult  E66.09 278.00    Z68.30 V85.30       Plan:       1. Medicare annual wellness visit, initial   Health Maintenance Summary   Full History      Expand All  Collapse All  Postponed - RSV Vaccine (Age 60+ and Pregnant patients)   (1 - 1-dose 60+ series)Postponed until 3/21/2024  No completion history exists for this topic.   Postponed - COVID-19 Vaccine   (3 - 2023-24 season)Postponed until 3/21/2025  08/14/2021  Imm Admin: COVID-19, MRNA, LN-S, PF (Pfizer) (Purple Cap)   07/15/2021  Imm Admin: COVID-19, MRNA, LN-S, PF (Pfizer) (Purple Cap)   Scheduled - Hemoglobin A1c (Prediabetes)   (Yearly)Scheduled for 9/5/2024 02/16/2024  Hemoglobin A1C External component of Hemoglobin A1C   12/06/2023  Hemoglobin A1C External component of Hemoglobin A1C   06/12/2023  Hemoglobin A1C External component of Hemoglobin A1C   12/06/2022  Hemoglobin A1C External component of Hemoglobin A1C   06/10/2022  Hemoglobin A1C External component of Hemoglobin A1C   View More History   High Dose Statin   (Yearly)Next due on  3/21/2025  03/21/2024  Registry Metric: Last Current Statin Reviewed Date   03/21/2024  Registry Metric: Last Current Statin Order Date   Pneumococcal Vaccines (Age 65+)   (3 of 3 - PPSV23 or PCV20)Next due on 6/2/2025 06/02/2020  Imm Admin: Pneumococcal Polysaccharide - 23 Valent   05/30/2019  Imm Admin: Pneumococcal Conjugate - 13 Valent   TETANUS VACCINE   (Every 10 Years)Next due on 1/23/2027 01/23/2017  Imm Admin: Tdap   Colorectal Cancer Screening   (Colonoscopy - Every 5 Years)Next due on 7/24/2028 07/24/2023  Outside Claim: [place] COLONOSCOPY,REMV LESN,SNARE   05/24/2018  [facility] COLONOSCOPY   01/01/2011  Colonoscopy (Done - Had polyps, done by MD at )   Scheduled - Lipid Panel   (Every 5 Years)Scheduled for 9/5/2024 02/16/2024  Cholesterol Total component of Lipid Panel   06/12/2023  Cholesterol Total component of Lipid Panel   12/06/2022  Cholesterol Total component of Lipid Panel   06/10/2022  Cholesterol Total component of Lipid Panel   12/10/2021  Cholesterol Total component of Lipid Panel   View More History   Hepatitis C Screening  Completed  03/23/2017  Hepatitis C Ab component of Hepatitis C antibody   Shingles Vaccine   (Series Information)Completed  02/02/2019  Imm Admin: Zoster Recombinant   10/04/2018  Imm Admin: Zoster Recombinant   HIV Screening  Completed  12/06/2022  HIV 1/2 Ag/Ab (4th Gen)   Influenza Vaccine   (Series Information)Addressed  03/21/2024  Declined   10/03/2022  Done - at work   12/01/2020  Imm Admin: Influenza - Quadrivalent - PF *Preferred* (6 months and older)   12/09/2019  Imm Admin: Influenza - Quadrivalent - PF *Preferred* (6 months and older)   01/23/2017  Imm Admin: Influenza - Quadrivalent - PF *Preferred* (6 months and older)       2. Mixed hyperlipidemia  Overview:  takes Rosuvastatin 10 mg daily.   LDL 85.8 in Feb 2023 - monitored yearly  Stable - stay on medication  Recheck in 6 months - if still > 70 = will increase dose.      Orders:  -     Lipid Panel; Future;  "Expected date: 03/21/2024  -     rosuvastatin (CRESTOR) 10 MG tablet; Take 1 tablet (10 mg total) by mouth once daily.  Dispense: 90 tablet; Refill: 1    3. Essential hypertension  Overview:  takes Amlodine -Valsartan 5- 320 mg daily  Blood pressure is stable.  /82 (BP Location: Left arm, Patient Position: Sitting, BP Method: Large (Manual))   Pulse 78   Temp 97.9 °F (36.6 °C) (Temporal)   Ht 5' 10" (1.778 m)   Wt 94.8 kg (209 lb 1.7 oz)   SpO2 99%   BMI 30.00 kg/m²   Recheck in 6 months.    Orders:  -     amlodipine-valsartan (EXFORGE) 5-320 mg per tablet; Take 1 tablet by mouth once daily.  Dispense: 90 tablet; Refill: 1    4. Stable angina  Overview:  Reported intermittent chest pain and excessive fatigue with activity; HOOPER  Went to see Dr. Jeferson Kaur, [facility] Cardiologist on 8/21/2023  EKG, Nuclear Stress test and CT Calcium Score Completed - unremarkable  Nuclear Stress Test 7/17/2023:  Impression  1. Small to moderate area of stress-induced ischemia in the anterolateral wall.  Addendum by Omar Low MD on 07/17/2023  4:48 PM CDT   #### ADDENDUM #1 #####  Upon further review, there is no appreciable reversible perfusion defect.   The slight variation in intensity of activity in the anterolateral wall is   likely artifactual. There is no evidence for ischemia on this exam.   Findings are reviewed with Dr. Kaur.   CT CALCIUM SCORE 7/17/2023:  There is minimal eccentric calcified plaque in the proximal left anterior descending artery. There is minimal calcified plaque at the origin of the right coronary artery. No additional calcified plaque is present. The Agatston score is 84.5. This places the patient in the 41st percentile for age and gender.   Per Dr. Kaur Cardiology progress notes 8/21/2023:  10 year risk 8.8 %. Patient's blood pressure is typically well controlled with systolic blood pressure in the 120's to 130's. Patient's lipids are at or near goal. I encouraged the patient to " continue a low cholesterol diet and take their medications.  Exercise  RTC 1 year       5. IFG (impaired fasting glucose)  Overview:   with HgbA1C of 5.1% at May 2018 visit   See updated levels below.  Continue to recommended stricter lifestyle modifications   Recheck A1C and CMP in 6 months.      Orders:  -     Comprehensive Metabolic Panel; Future; Expected date: 03/21/2024  -     Hemoglobin A1C; Future; Expected date: 03/21/2024    6. Prostate cancer  Overview:  diagnosed around May 2018 when had prostate biopsy done but has not required any treatment.    PSA is checked every 6 months by Dr. Arana along with BPH  Had an appointment with Dr. Arana 1/5/2024         7. Chronic anemia  Overview:  Chronic Anemia/Family report of Pernicious Anemia  History of Chronic Anemia  Anemia workup in 2018:  His iron and Ferritin levels were normal.    His Vitamin D level was normal but on low end of normal at 35.    His Vitamin B12 level was also normal at 386 but on the lower end of normal - recommended daily supplementation at April 2018 visit.  Patient has been taking his B12 and Vitamin D supplements.  Anemia is STABLE  H&H - 13.4 & 37.2  Vit D - 39  Vitamin [id number] - can cut dose down         8. Cervical radiculopathy  Overview:  Followed by Dr. Cannon ([facility] group)  Had epidural injection to neck 3/23/22  Followed by Dr. Cannon.       9. Personal history of colonic polyps  Overview:  Followed by GI MD Montemayor  Last colonoscopy 7/24/2023 - + polyp (requesting record)  Repeat in 5 years.      10. Class 1 obesity due to excess calories with serious comorbidity and body mass index (BMI) of 30.0 to 30.9 in adult  Overview:  Body mass index is 30 kg/m².  Working on lifestyle modifications         Follow up in about 6 months (around 9/21/2024) for fasting labs and follow up.     Patient's Medications   New Prescriptions    No medications on file   Previous Medications    CHOLECALCIFEROL, VITAMIN D3, (VITAMIN D3) 25 MCG  (1,000 UNIT) CAPSULE    Take 1 capsule (1,000 Units total) by mouth once daily.    CYANOCOBALAMIN (VITAMIN B-12) 1000 MCG TABLET    Take 1 tablet (1,000 mcg total) by mouth once daily.   Modified Medications    Modified Medication Previous Medication    AMLODIPINE-VALSARTAN (EXFORGE) 5-320 MG PER TABLET amlodipine-valsartan (EXFORGE) 5-320 mg per tablet       Take 1 tablet by mouth once daily.    TAKE 1 TABLET DAILY    ROSUVASTATIN (CRESTOR) 10 MG TABLET rosuvastatin (CRESTOR) 10 MG tablet       Take 1 tablet (10 mg total) by mouth once daily.    TAKE 1 TABLET DAILY   Discontinued Medications    No medications on file       Past Medical History:   Diagnosis Date    Benign non-nodular prostatic hyperplasia with lower urinary tract symptoms 07/19/2016    Followed by Dr. Arana    Cervical radiculopathy 03/07/2022    [facility] - had epidural steroid on 3/23/22    Cervical spondylosis 2017    orthopedic - Dr. Guillaume MARTINEZ-19 virus detected 10/18/2020    [facility].    Elevated PSA, less than 10 ng/ml 05/09/2018    Essential hypertension 03/09/2018    GERD with esophagitis     EGD done 12/11/2018 with Dr. Mynor Montemayor    Intermittent chest pain 12/13/2023    Reported intermittent chest pain and excessive fatigue with activity; HOOPER  Went to see Dr. Jeferson Kaur, [facility] Cardiologist on 8/21/2023  EKG, Nuclear Stress test and CT Calcium Score Completed - unremarkable  Nuclear Stress Test 7/17/2023:  Impression  1. Small to moderate area of stress-induced ischemia in the anterolateral wall.  Addendum by Omar Low MD on 07/17/2023  4:48 PM CDT   ####    Pernicious anemia     per patient report    Prostate cancer 08/31/2018    prostate biopsy 5/14/2018 - no treatment required - being followed by [facility] Dr. Arana.    Urinary tract infection        Past Surgical History:   Procedure Laterality Date    COLONOSCOPY  05/24/2018    [facility]   Mynor Montemayor--Polyps (6 mm) in ascending colon and hepatic flexure, Angioectasia in ascending colon, Internal Hemorrhoids.   Colonoscopy in 5 years     OTHER SURGICAL HISTORY      angiogram on wrist    PROSTATE BIOPSY  05/14/2018    done by Dr. Arana.    UPPER GASTROINTESTINAL ENDOSCOPY  12/11/2018    Dr. Montemayor - [facility] - + 7mm polyp in the cardia biopsied - hyperplastic polyp.  GERD with mild chronic esophagitis present.       Family History   Problem Relation Age of Onset    Cancer Paternal Grandmother     No Known Problems Mother     Heart disease Father         passed age75    Hypertension Father     Hyperlipidemia Father     No Known Problems Sister     No Known Problems Sister     No Known Problems Sister     Kidney disease Neg Hx     Prostate cancer Neg Hx        Social History     Socioeconomic History    Marital status: Single   Occupational History    Occupation: sales   Tobacco Use    Smoking status: Never     Passive exposure: Never    Smokeless tobacco: Never   Substance and Sexual Activity    Alcohol use: Yes     Comment: every other weekend - 2 drinks per occasion    Drug use: No    Sexual activity: Yes     Partners: Female

## 2024-06-03 DIAGNOSIS — E78.2 MIXED HYPERLIPIDEMIA: ICD-10-CM

## 2024-06-03 DIAGNOSIS — I10 ESSENTIAL HYPERTENSION: ICD-10-CM

## 2024-06-04 RX ORDER — ROSUVASTATIN CALCIUM 10 MG/1
10 TABLET, COATED ORAL DAILY
Qty: 90 TABLET | Refills: 1 | Status: SHIPPED | OUTPATIENT
Start: 2024-06-04 | End: 2024-06-18 | Stop reason: SDUPTHER

## 2024-06-04 RX ORDER — VALSARTAN 160 MG/1
160 TABLET ORAL
Qty: 90 TABLET | Refills: 0 | OUTPATIENT
Start: 2024-06-04

## 2024-06-04 RX ORDER — AMLODIPINE AND VALSARTAN 5; 320 MG/1; MG/1
1 TABLET ORAL DAILY
Qty: 90 TABLET | Refills: 1 | Status: SHIPPED | OUTPATIENT
Start: 2024-06-04 | End: 2024-06-18 | Stop reason: SDUPTHER

## 2024-06-15 ENCOUNTER — PATIENT MESSAGE (OUTPATIENT)
Dept: FAMILY MEDICINE | Facility: CLINIC | Age: 64
End: 2024-06-15
Payer: MEDICARE

## 2024-06-15 DIAGNOSIS — I10 ESSENTIAL HYPERTENSION: ICD-10-CM

## 2024-06-15 DIAGNOSIS — E78.2 MIXED HYPERLIPIDEMIA: ICD-10-CM

## 2024-06-18 RX ORDER — AMLODIPINE AND VALSARTAN 5; 320 MG/1; MG/1
1 TABLET ORAL DAILY
Qty: 90 TABLET | Refills: 1 | Status: SHIPPED | OUTPATIENT
Start: 2024-06-18

## 2024-06-18 RX ORDER — ROSUVASTATIN CALCIUM 10 MG/1
10 TABLET, COATED ORAL DAILY
Qty: 90 TABLET | Refills: 1 | Status: SHIPPED | OUTPATIENT
Start: 2024-06-18

## 2024-06-18 NOTE — TELEPHONE ENCOUNTER
Spoke with patient on phone.  He has been on Amlodipine/valsartan 5/320 mg tablet daily and stable - we have not made any changes to this medication.  Patient states he changed insurance and needs to change pharmacy to [facility] and needs new prescription on both his medications.  Medications sent to [facility].

## 2024-07-01 ENCOUNTER — TELEPHONE (OUTPATIENT)
Dept: UROLOGY | Facility: CLINIC | Age: 64
End: 2024-07-01
Payer: MEDICARE

## 2024-07-01 NOTE — TELEPHONE ENCOUNTER
----- Message from Marlyn Parker sent at 7/1/2024  8:49 AM CDT -----  Contact: Mia  Type:  Needs Medical Advice    Who Called: pt  Symptoms (please be specific): pt states she was offered 1 pm instead of 230 on 7/5 she will be there at 1pm on Friday    Would the patient rather a call back or a response via [facility]? call  Best Call Back Number: [phone]  Additional Information:

## 2024-07-02 ENCOUNTER — PATIENT MESSAGE (OUTPATIENT)
Dept: UROLOGY | Facility: CLINIC | Age: 64
End: 2024-07-02
Payer: MEDICARE

## 2024-07-05 ENCOUNTER — OFFICE VISIT (OUTPATIENT)
Dept: UROLOGY | Facility: CLINIC | Age: 64
End: 2024-07-05
Payer: MEDICARE

## 2024-07-05 VITALS
SYSTOLIC BLOOD PRESSURE: 105 MMHG | WEIGHT: 211.19 LBS | DIASTOLIC BLOOD PRESSURE: 71 MMHG | HEIGHT: 70 IN | HEART RATE: 82 BPM | BODY MASS INDEX: 30.23 KG/M2

## 2024-07-05 DIAGNOSIS — N40.1 BENIGN NON-NODULAR PROSTATIC HYPERPLASIA WITH LOWER URINARY TRACT SYMPTOMS: Primary | ICD-10-CM

## 2024-07-05 DIAGNOSIS — R35.1 NOCTURIA: ICD-10-CM

## 2024-07-05 PROCEDURE — 3078F DIAST BP <80 MM HG: CPT | Mod: CPTII,S$GLB,, | Performed by: UROLOGY

## 2024-07-05 PROCEDURE — 99215 OFFICE O/P EST HI 40 MIN: CPT | Mod: S$GLB,,, | Performed by: UROLOGY

## 2024-07-05 PROCEDURE — 3288F FALL RISK ASSESSMENT DOCD: CPT | Mod: CPTII,S$GLB,, | Performed by: UROLOGY

## 2024-07-05 PROCEDURE — 99999 PR PBB SHADOW E&M-EST. PATIENT-LVL III: CPT | Mod: PBBFAC,,, | Performed by: UROLOGY

## 2024-07-05 PROCEDURE — 1160F RVW MEDS BY RX/DR IN RCRD: CPT | Mod: CPTII,S$GLB,, | Performed by: UROLOGY

## 2024-07-05 PROCEDURE — 3044F HG A1C LEVEL LT 7.0%: CPT | Mod: CPTII,S$GLB,, | Performed by: UROLOGY

## 2024-07-05 PROCEDURE — 1101F PT FALLS ASSESS-DOCD LE1/YR: CPT | Mod: CPTII,S$GLB,, | Performed by: UROLOGY

## 2024-07-05 PROCEDURE — 1126F AMNT PAIN NOTED NONE PRSNT: CPT | Mod: CPTII,S$GLB,, | Performed by: UROLOGY

## 2024-07-05 PROCEDURE — 3074F SYST BP LT 130 MM HG: CPT | Mod: CPTII,S$GLB,, | Performed by: UROLOGY

## 2024-07-05 PROCEDURE — 1159F MED LIST DOCD IN RCRD: CPT | Mod: CPTII,S$GLB,, | Performed by: UROLOGY

## 2024-07-05 PROCEDURE — 4010F ACE/ARB THERAPY RXD/TAKEN: CPT | Mod: CPTII,S$GLB,, | Performed by: UROLOGY

## 2024-07-05 PROCEDURE — 3008F BODY MASS INDEX DOCD: CPT | Mod: CPTII,S$GLB,, | Performed by: UROLOGY

## 2024-07-05 NOTE — PATIENT INSTRUCTIONS
Follow-up yearly with PSA test  Patient to follow up with me as needed for any other urologic issues    Check testosterone and estrogen level in evaluation of fatigue.    Will ask primary care to water PSA level in between my visits with the patient so that we have at least 2 year.

## 2024-07-05 NOTE — LETTER
July 5, 2024        [place]  [address]  [address]  [place] [address]             [facility]  [address]  [address]  [place] [address]  Phone: [phone]  Fax: [phone]   Patient: Diallo Dawkins   MR Number: [id number]   YOB: 1960   Date of Visit: 7/5/2024       Dear Dr. Castro:    Thank you for referring Diallo Dawkins to me for evaluation. Below are the relevant portions of my assessment and plan of care.        Keya,  I am changing Mr. Whitney to annual follow-up with PSA test at present.  I am also ordering some preliminary test with testosterone estrogen levels due to patient fatigue.  Patient plans to discuss this with you.  If you could order a PSA test in between my annual visits with him that way we check him twice yearly I would be grateful, thank you.    If you have questions, please do not hesitate to call me. I look forward to following Diallo along with you.    Sincerely,      Maxx Arana MD           CC    No Recipients

## 2024-07-05 NOTE — PROGRESS NOTES
Subjective:      Patient ID: Diallo Dawkins is a 63 y.o. male.    Chief Complaint: Follow-up    Mr. Whitney is a 63-year-old gentleman with a history of elevated PSA in the past.  His PSA has been normal over the last 2 years in his 1.1 is present.  We have been checking him at six-month intervals.  I will have his primary care check in between our visits and I will see him yearly with a PSA test if anything changes we will intervene at that time.  The patient is not having any significant urinary symptoms from his BPH he has nocturia x1 and frequency times 2-3 he does not complain of any issues with emptying his bladder we will monitor this as well.    Follow-up  This is a chronic problem. The current episode started more than 1 year ago. The problem occurs constantly. The problem has been resolved. Associated symptoms include fatigue (With light activity). Pertinent negatives include no abdominal pain, anorexia, arthralgias, change in bowel habit, chest pain, chills, congestion, coughing, diaphoresis, fever, headaches, joint swelling, myalgias, nausea, neck pain, numbness, rash, sore throat, swollen glands, urinary symptoms, vertigo, visual change, vomiting or weakness. Nothing aggravates the symptoms. He has tried nothing for the symptoms. The treatment provided significant relief.     Review of Systems   Constitutional:  Positive for fatigue (With light activity). Negative for activity change, appetite change, chills, diaphoresis, fever and unexpected weight change.   HENT:  Negative for congestion, hearing loss, sinus pressure, sore throat and trouble swallowing.    Eyes:  Negative for photophobia, pain, discharge and visual disturbance.   Respiratory:  Negative for apnea, cough and shortness of breath.    Cardiovascular:  Negative for chest pain, palpitations and leg swelling.   Gastrointestinal:  Negative for abdominal distention, abdominal pain, anal bleeding, anorexia, blood in stool, change in bowel  habit, constipation, diarrhea, nausea, rectal pain and vomiting.   Endocrine: Negative for cold intolerance, heat intolerance, polydipsia, polyphagia and polyuria.   Genitourinary:  Negative for decreased urine volume, difficulty urinating, dysuria, enuresis, flank pain, frequency, genital sores, hematuria, penile discharge, penile pain, penile swelling, scrotal swelling, testicular pain and urgency.   Musculoskeletal:  Negative for arthralgias, back pain, joint swelling, myalgias and neck pain.   Skin:  Negative for color change, pallor, rash and wound.   Allergic/Immunologic: Negative for environmental allergies, food allergies and immunocompromised state.   Neurological:  Negative for dizziness, vertigo, seizures, weakness, numbness and headaches.   Hematological:  Negative for adenopathy. Does not bruise/bleed easily.   Psychiatric/Behavioral: Negative.        Objective:     Physical Exam  Vitals and nursing note reviewed.   Constitutional:       Appearance: Normal appearance. He is well-developed and normal weight.   HENT:      Head: Normocephalic.      Right Ear: External ear normal.      Left Ear: External ear normal.      Nose: Nose normal.      Mouth/Throat:      Pharynx: No oropharyngeal exudate or posterior oropharyngeal erythema.   Eyes:      General: No scleral icterus.        Right eye: No discharge.         Left eye: No discharge.      Extraocular Movements: Extraocular movements intact.      Conjunctiva/sclera: Conjunctivae normal.      Pupils: Pupils are equal, round, and reactive to light.   Cardiovascular:      Rate and Rhythm: Normal rate and regular rhythm.      Heart sounds: Normal heart sounds.   Pulmonary:      Effort: Pulmonary effort is normal.   Abdominal:      General: Bowel sounds are normal. There is no distension.      Palpations: Abdomen is soft. There is no mass.      Tenderness: There is no abdominal tenderness. There is no right CVA tenderness, left CVA tenderness, guarding or  rebound.      Hernia: No hernia is present.   Genitourinary:     Penis: Normal.       Testes: Normal.      Comments: Patient with no CVA tenderness, normal penis and scrotum.  Bladder nontender.  Musculoskeletal:         General: Normal range of motion.      Cervical back: Normal range of motion and neck supple.      Right lower leg: No edema.      Left lower leg: No edema.   Skin:     General: Skin is warm and dry.      Capillary Refill: Capillary refill takes 2 to 3 seconds.      Findings: No lesion or rash.   Neurological:      General: No focal deficit present.      Mental Status: He is alert and oriented to person, place, and time.      Deep Tendon Reflexes: Reflexes are normal and symmetric.   Psychiatric:         Mood and Affect: Mood normal.         Behavior: Behavior normal.         Thought Content: Thought content normal.         Judgment: Judgment normal.        Assessment:      1. Benign non-nodular prostatic hyperplasia with lower urinary tract symptoms    2. Nocturia      Plan:     Patient Instructions   Follow-up yearly with PSA test  Patient to follow up with me as needed for any other urologic issues    Check testosterone and estrogen level in evaluation of fatigue.    Will ask primary care to water PSA level in between my visits with the patient so that we have at least 2 year.

## 2024-07-18 ENCOUNTER — PATIENT MESSAGE (OUTPATIENT)
Dept: UROLOGY | Facility: CLINIC | Age: 64
End: 2024-07-18
Payer: MEDICARE

## 2024-07-25 ENCOUNTER — PATIENT MESSAGE (OUTPATIENT)
Dept: UROLOGY | Facility: CLINIC | Age: 64
End: 2024-07-25
Payer: MEDICARE

## 2024-09-13 DIAGNOSIS — I10 ESSENTIAL HYPERTENSION: ICD-10-CM

## 2024-09-13 DIAGNOSIS — E78.2 MIXED HYPERLIPIDEMIA: ICD-10-CM

## 2024-09-13 RX ORDER — ROSUVASTATIN CALCIUM 10 MG/1
10 TABLET, COATED ORAL
Qty: 90 TABLET | Refills: 1 | Status: SHIPPED | OUTPATIENT
Start: 2024-09-13

## 2024-09-13 RX ORDER — AMLODIPINE AND VALSARTAN 5; 320 MG/1; MG/1
1 TABLET ORAL
Qty: 90 TABLET | Refills: 1 | Status: SHIPPED | OUTPATIENT
Start: 2024-09-13

## 2024-10-02 ENCOUNTER — PATIENT MESSAGE (OUTPATIENT)
Dept: FAMILY MEDICINE | Facility: CLINIC | Age: 64
End: 2024-10-02

## 2024-10-02 ENCOUNTER — OFFICE VISIT (OUTPATIENT)
Dept: FAMILY MEDICINE | Facility: CLINIC | Age: 64
End: 2024-10-02
Payer: MEDICARE

## 2024-10-02 VITALS
HEIGHT: 70 IN | WEIGHT: 212.44 LBS | BODY MASS INDEX: 30.41 KG/M2 | SYSTOLIC BLOOD PRESSURE: 124 MMHG | HEART RATE: 93 BPM | OXYGEN SATURATION: 98 % | TEMPERATURE: 98 F | DIASTOLIC BLOOD PRESSURE: 82 MMHG

## 2024-10-02 DIAGNOSIS — K51.40 PSEUDOPOLYPOSIS OF COLON WITHOUT COMPLICATION, UNSPECIFIED PART OF COLON: ICD-10-CM

## 2024-10-02 DIAGNOSIS — M54.12 CERVICAL RADICULOPATHY: ICD-10-CM

## 2024-10-02 DIAGNOSIS — R73.01 IFG (IMPAIRED FASTING GLUCOSE): ICD-10-CM

## 2024-10-02 DIAGNOSIS — D53.9 NUTRITIONAL ANEMIA, UNSPECIFIED: ICD-10-CM

## 2024-10-02 DIAGNOSIS — C61 PROSTATE CANCER: ICD-10-CM

## 2024-10-02 DIAGNOSIS — Z13.1 DIABETES MELLITUS SCREENING: ICD-10-CM

## 2024-10-02 DIAGNOSIS — Z00.00 ENCOUNTER FOR BLOOD TEST FOR ROUTINE GENERAL PHYSICAL EXAMINATION: ICD-10-CM

## 2024-10-02 DIAGNOSIS — Z23 FLU VACCINE NEED: ICD-10-CM

## 2024-10-02 DIAGNOSIS — E78.2 MIXED HYPERLIPIDEMIA: ICD-10-CM

## 2024-10-02 DIAGNOSIS — L85.3 DRY SKIN: ICD-10-CM

## 2024-10-02 DIAGNOSIS — N40.0 BENIGN PROSTATIC HYPERPLASIA, UNSPECIFIED WHETHER LOWER URINARY TRACT SYMPTOMS PRESENT: ICD-10-CM

## 2024-10-02 DIAGNOSIS — I20.89 STABLE ANGINA: ICD-10-CM

## 2024-10-02 DIAGNOSIS — E66.09 CLASS 1 OBESITY DUE TO EXCESS CALORIES WITH SERIOUS COMORBIDITY AND BODY MASS INDEX (BMI) OF 30.0 TO 30.9 IN ADULT: ICD-10-CM

## 2024-10-02 DIAGNOSIS — D64.9 CHRONIC ANEMIA: ICD-10-CM

## 2024-10-02 DIAGNOSIS — Z13.29 THYROID DISORDER SCREEN: ICD-10-CM

## 2024-10-02 DIAGNOSIS — E66.811 CLASS 1 OBESITY DUE TO EXCESS CALORIES WITH SERIOUS COMORBIDITY AND BODY MASS INDEX (BMI) OF 30.0 TO 30.9 IN ADULT: ICD-10-CM

## 2024-10-02 DIAGNOSIS — I10 ESSENTIAL HYPERTENSION: Primary | ICD-10-CM

## 2024-10-02 PROCEDURE — 3008F BODY MASS INDEX DOCD: CPT | Mod: CPTII,S$GLB,, | Performed by: NURSE PRACTITIONER

## 2024-10-02 PROCEDURE — 1101F PT FALLS ASSESS-DOCD LE1/YR: CPT | Mod: CPTII,S$GLB,, | Performed by: NURSE PRACTITIONER

## 2024-10-02 PROCEDURE — 90653 IIV ADJUVANT VACCINE IM: CPT | Mod: S$GLB,,, | Performed by: NURSE PRACTITIONER

## 2024-10-02 PROCEDURE — 3074F SYST BP LT 130 MM HG: CPT | Mod: CPTII,S$GLB,, | Performed by: NURSE PRACTITIONER

## 2024-10-02 PROCEDURE — 99999 PR PBB SHADOW E&M-EST. PATIENT-LVL IV: CPT | Mod: PBBFAC,,, | Performed by: NURSE PRACTITIONER

## 2024-10-02 PROCEDURE — 1160F RVW MEDS BY RX/DR IN RCRD: CPT | Mod: CPTII,S$GLB,, | Performed by: NURSE PRACTITIONER

## 2024-10-02 PROCEDURE — 1159F MED LIST DOCD IN RCRD: CPT | Mod: CPTII,S$GLB,, | Performed by: NURSE PRACTITIONER

## 2024-10-02 PROCEDURE — 3044F HG A1C LEVEL LT 7.0%: CPT | Mod: CPTII,S$GLB,, | Performed by: NURSE PRACTITIONER

## 2024-10-02 PROCEDURE — 3288F FALL RISK ASSESSMENT DOCD: CPT | Mod: CPTII,S$GLB,, | Performed by: NURSE PRACTITIONER

## 2024-10-02 PROCEDURE — 99214 OFFICE O/P EST MOD 30 MIN: CPT | Mod: S$GLB,,, | Performed by: NURSE PRACTITIONER

## 2024-10-02 PROCEDURE — 1126F AMNT PAIN NOTED NONE PRSNT: CPT | Mod: CPTII,S$GLB,, | Performed by: NURSE PRACTITIONER

## 2024-10-02 PROCEDURE — G0008 ADMIN INFLUENZA VIRUS VAC: HCPCS | Mod: S$GLB,,, | Performed by: NURSE PRACTITIONER

## 2024-10-02 PROCEDURE — 3079F DIAST BP 80-89 MM HG: CPT | Mod: CPTII,S$GLB,, | Performed by: NURSE PRACTITIONER

## 2024-10-02 PROCEDURE — 4010F ACE/ARB THERAPY RXD/TAKEN: CPT | Mod: CPTII,S$GLB,, | Performed by: NURSE PRACTITIONER

## 2024-10-02 RX ORDER — UBIDECARENONE 75 MG
500 CAPSULE ORAL DAILY
COMMUNITY
Start: 2024-10-02

## 2024-10-02 NOTE — PROGRESS NOTES
"Subjective:       Patient ID: Diallo Dawkins is a 64 y.o. male.    Chief Complaint: Follow-up (6 months F/U) and dry skin to hands and feet        HPI WITH ASSESSMENT AND PLAN OF CARE:      Patient is a 63 year old white male with Stable Angina evaluated by Cardiology, Hypertension, Hyperlipidemia, IFG, Prostate Cancer with BPH followed by Dr. Arana, mild pernicious anemia, cervical radiculopathy  and chronic GERD followed by Dr. Montemayor that is here today for 6 month follow-up with fasting lab results.  Pt with new complaints of dry flaky skin to hands and feet.        Hypertension  takes Amlodine -Valsartan 5- 320 mg daily  Blood pressure is stable.  /82 (BP Location: Left arm, Patient Position: Sitting)   Pulse 93   Temp 97.9 °F (36.6 °C) (Temporal)   Ht 5' 10" (1.778 m)   Wt 96.3 kg (212 lb 6.6 oz)   SpO2 98%   BMI 30.48 kg/m²   Recheck in 6 months.       Hyperlipidemia  takes Rosuvastatin 10 mg daily.   LDL 57.2  Stable - stay on medication  Recheck in 6 months for wellness exam            Intermittent Chest Pain/stable angina  Reported intermittent chest pain and excessive fatigue with activity; HOOPER in 2023  Went to see Dr. Jeferson Kaur, [facility] on 8/21/2023  EKG, Nuclear Stress test and CT Calcium Score Completed - unremarkable  Nuclear Stress Test 7/17/2023:  Impression  1. Small to moderate area of stress-induced ischemia in the anterolateral wall.  Addendum by Omar Low MD on 07/17/2023  4:48 PM CDT   #### ADDENDUM #1 #####  Upon further review, there is no appreciable reversible perfusion defect.   The slight variation in intensity of activity in the anterolateral wall is   likely artifactual. There is no evidence for ischemia on this exam.   Findings are reviewed with Dr. Kaur.   CT CALCIUM SCORE 7/17/2023:  There is minimal eccentric calcified plaque in the proximal left anterior descending artery. There is minimal calcified plaque at the origin of the right " coronary artery. No additional calcified plaque is present. The Agatston score is 84.5. This places the patient in the 41st percentile for age and gender.   Per Dr. Kaur Cardiology progress notes 8/21/2023:  10 year risk 8.8 %. Patient's blood pressure is typically well controlled with systolic blood pressure in the 120's to 130's. Patient's lipids are at or near goal. I encouraged the patient to continue a low cholesterol diet and take their medications.  Exercise  Last Cardiology appt 8/21/2024 Dr. Kaur:  No changes  Recheck in 1 year.       Prostate Cancer   diagnosed around May 2018 when had prostate biopsy done but has not required any treatment.    PSA is checked every 6 months by Dr. Arana along with BPH  Follows with Dr. Arana 7/5/24-Who requests primary care to watch PSA level in between urology visits with the patient so that we have at least 2 PSA levels per year                 Chronic Anemia/Family report of Pernicious Anemia  History of Chronic Anemia  Anemia workup in 2018:  His iron and Ferritin levels were normal.    His Vitamin D level was normal but on low end of normal at 35.    His Vitamin B12 level was also normal at 386 but on the lower end of normal - recommended daily supplementation at April 2018 visit.  Patient has been taking his B12 1000 mcg and Vitamin D 1000 unit supplements.  Anemia STABLE  Vit D - 39  Vitamin d50-2374  States he does not take vitamins every day  Advised patient to take VITAMIN D# 1000 units every day  DECREASE the Vitamin B12 500 mcg daily   Recheck levels in 6 months.             Impaired Fasting Glucose   with HgbA1C of 5.1% at May 2018 visit   See updated levels below.  Continue to recommended stricter lifestyle modifications   Recheck A1C and CMP in 6 months.            Obesity  Body mass index is 30.48 kg/m².  Working on lifestyle modifications        Cervical Radiculopathy/Spondylosis   Followed by Dr. Cannon ([facility])  Had epidural injection  to neck 3/23/22  Followed by Dr. Cannon - will follow up if neck pain recurs/worsen.        Personal History of Colon Polyps  Followed by GI MD Montemayor  Last colonoscopy 7/24/2023 - + polyp   Repeat in 5 years.      Dry skin to hands and feet  Uses lotion  See pictures below   Discussed moisturizer use such as Eucerin or Aquaphor   If no improvement will refer to dermatology                 Lab results:  CMP with IFG otherwise normal  Cholesterol discussed above  HgbA1C 5.5%       Lab Visit on 09/09/2024   Component Date Value Ref Range Status    Sodium 09/09/2024 140  136 - 145 mmol/L Final    Potassium 09/09/2024 4.4  3.5 - 5.1 mmol/L Final    Chloride 09/09/2024 106  95 - 110 mmol/L Final    CO2 09/09/2024 24  23 - 29 mmol/L Final    Glucose 09/09/2024 119 (H)  70 - 110 mg/dL Final    BUN 09/09/2024 21  8 - 23 mg/dL Final    Creatinine 09/09/2024 1.1  0.5 - 1.4 mg/dL Final    Calcium 09/09/2024 9.3  8.7 - 10.5 mg/dL Final    Total Protein 09/09/2024 7.2  6.0 - 8.4 g/dL Final    Albumin 09/09/2024 4.1  3.5 - 5.2 g/dL Final    Total Bilirubin 09/09/2024 0.5  0.1 - 1.0 mg/dL Final    Comment: For infants and newborns, interpretation of results should be based  on gestational age, weight and in agreement with clinical  observations.    Premature Infant recommended reference ranges:  Up to 24 hours.............<8.0 mg/dL  Up to 48 hours............<12.0 mg/dL  3-5 days..................<15.0 mg/dL  6-29 days.................<15.0 mg/dL      Alkaline Phosphatase 09/09/2024 47 (L)  55 - 135 U/L Final    AST 09/09/2024 21  10 - 40 U/L Final    ALT 09/09/2024 24  10 - 44 U/L Final    eGFR 09/09/2024 >60.0  >60 mL/min/1.73 m^2 Final    Anion Gap 09/09/2024 10  8 - 16 mmol/L Final    Hemoglobin A1C 09/09/2024 5.5  4.0 - 5.6 % Final    Comment: ADA Screening Guidelines:  5.7-6.4%  Consistent with prediabetes  >or=6.5%  Consistent with diabetes    High levels of fetal hemoglobin interfere with the HbA1C  assay. Heterozygous  hemoglobin variants (HbS, HgC, etc)do  not significantly interfere with this assay.   However, presence of multiple variants may affect accuracy.      Estimated Avg Glucose 09/09/2024 111  68 - 131 mg/dL Final    Cholesterol 09/09/2024 113 (L)  120 - 199 mg/dL Final    Comment: The National Cholesterol Education Program (NCEP) has set the  following guidelines (reference ranges) for Cholesterol:  Optimal.....................<200 mg/dL  Borderline High.............200-239 mg/dL  High........................> or = 240 mg/dL      Triglycerides 09/09/2024 109  30 - 150 mg/dL Final    Comment: The National Cholesterol Education Program (NCEP) has set the  following guidelines (reference values) for triglycerides:  Normal......................<150 mg/dL  Borderline High.............150-199 mg/dL  High........................200-499 mg/dL      HDL 09/09/2024 34 (L)  40 - 75 mg/dL Final    Comment: The National Cholesterol Education Program (NCEP) has set the  following guidelines (reference values) for HDL Cholesterol:  Low...............<40 mg/dL  Optimal...........>60 mg/dL      LDL Cholesterol 09/09/2024 57.2 (L)  63.0 - 159.0 mg/dL Final    Comment: The National Cholesterol Education Program (NCEP) has set the  following guidelines (reference values) for LDL Cholesterol:  Optimal.......................<130 mg/dL  Borderline High...............130-159 mg/dL  High..........................160-189 mg/dL  Very High.....................>190 mg/dL      HDL/Cholesterol Ratio 09/09/2024 30.1  20.0 - 50.0 % Final    Total Cholesterol/HDL Ratio 09/09/2024 3.3  2.0 - 5.0 Final    Non-HDL Cholesterol 09/09/2024 79  mg/dL Final    Comment: Risk category and Non-HDL cholesterol goals:  Coronary heart disease (CHD)or equivalent (10-year risk of CHD >20%):  Non-HDL cholesterol goal     <130 mg/dL  Two or more CHD risk factors and 10-year risk of CHD <= 20%:  Non-HDL cholesterol goal     <160 mg/dL  0 to 1 CHD risk factor:  Non-HDL  "cholesterol goal     <190 mg/dL         Vitals:    10/02/24 1441   BP: 124/82   BP Location: Left arm   Patient Position: Sitting   Pulse: 93   Temp: 97.9 °F (36.6 °C)   TempSrc: Temporal   SpO2: 98%   Weight: 96.3 kg (212 lb 6.6 oz)   Height: 5' 10" (1.778 m)         Diagnoses this Encounter:         ICD-10-CM ICD-9-CM   1. Essential hypertension  I10 401.9   2. Mixed hyperlipidemia  E78.2 272.2   3. Benign prostatic hyperplasia, unspecified whether lower urinary tract symptoms present  N40.0 600.00   4. Stable angina  I20.89 413.9   5. Prostate cancer  C61 185   6. Chronic anemia  D64.9 285.9   7. IFG (impaired fasting glucose)  R73.01 790.21   8. Class 1 obesity due to excess calories with serious comorbidity and body mass index (BMI) of 30.0 to 30.9 in adult  E66.811 278.00    E66.09 V85.30    Z68.30    9. Pseudopolyposis of colon without complication, unspecified part of colon  K51.40 556.4   10. Cervical radiculopathy  M54.12 723.4   11. Dry skin  L85.3 701.1   12. Flu vaccine need  Z23 V04.81   13. Encounter for blood test for routine general physical examination  Z00.00 V72.62   14. Thyroid disorder screen  Z13.29 V77.0   15. Diabetes mellitus screening  Z13.1 V77.1   16. Body mass index (BMI) 30.0-30.9, adult  Z68.30 V85.30   17. Nutritional anemia, unspecified  D53.9 281.9       Orders Placed This Encounter    CBC Auto Differential    Comprehensive Metabolic Panel    Hemoglobin A1C    Lipid Panel    Vitamin D 25 hydroxy    TSH    Vitamin B12    influenza (adjuvanted) (Fluad) 45 mcg/0.5 mL IM vaccine (> or = 64 yo) 0.5 mL    cyanocobalamin (VITAMIN B-12) 500 MCG tablet        Follow up in about 6 months (around 4/2/2025) for fasting labs and MEDICARE WELLNESS.     Patient's Medications   New Prescriptions    CYANOCOBALAMIN (VITAMIN B-12) 500 MCG TABLET    Take 1 tablet (500 mcg total) by mouth once daily.   Previous Medications    AMLODIPINE-VALSARTAN (EXFORGE) 5-320 MG PER TABLET    TAKE 1 TABLET BY MOUTH " EVERY DAY    CHOLECALCIFEROL, VITAMIN D3, (VITAMIN D3) 25 MCG (1,000 UNIT) CAPSULE    Take 1 capsule (1,000 Units total) by mouth once daily.    ROSUVASTATIN (CRESTOR) 10 MG TABLET    TAKE 1 TABLET BY MOUTH EVERY DAY   Modified Medications    No medications on file   Discontinued Medications    CYANOCOBALAMIN (VITAMIN B-12) 1000 MCG TABLET    Take 1 tablet (1,000 mcg total) by mouth once daily.         Review of Systems   HENT: Negative.     Respiratory: Negative.     Cardiovascular: Negative.    Gastrointestinal: Negative.    Musculoskeletal:  Positive for myalgias and neck pain.   Skin:         Dry skin         Objective:        Physical Exam  Constitutional:       General: He is not in acute distress.     Appearance: He is well-developed. He is not ill-appearing, toxic-appearing or diaphoretic.      Comments: Body mass index is 30 kg/m².           HENT:      Head: Normocephalic and atraumatic.   Eyes:      General: No scleral icterus.        Right eye: No discharge.         Left eye: No discharge.      Extraocular Movements: Extraocular movements intact.      Conjunctiva/sclera: Conjunctivae normal.   Neck:      Thyroid: No thyromegaly.      Trachea: No tracheal deviation.   Cardiovascular:      Rate and Rhythm: Normal rate and regular rhythm.      Heart sounds: Normal heart sounds. No murmur heard.  Pulmonary:      Effort: Pulmonary effort is normal. No respiratory distress.      Breath sounds: Normal breath sounds.   Abdominal:      General: There is no distension.   Genitourinary:     Comments: Deferred to urologist  Musculoskeletal:         General: Normal range of motion.      Cervical back: Normal range of motion.   Skin:     General: Skin is warm and dry.      Comments: Dry skin to hands and feet - see pictures above.   Neurological:      Mental Status: He is alert and oriented to person, place, and time.   Psychiatric:         Mood and Affect: Mood normal.         Behavior: Behavior normal.          Thought Content: Thought content normal.         Judgment: Judgment normal.             Past Medical History:   Diagnosis Date    Benign non-nodular prostatic hyperplasia with lower urinary tract symptoms 07/19/2016    Followed by Dr. Arana    Cervical radiculopathy 03/07/2022    [facility] - had epidural steroid on 3/23/22    Cervical spondylosis 2017    orthopedic - Dr. Guillaume MARTINEZ-19 virus detected 10/18/2020    [facility].    Elevated PSA, less than 10 ng/ml 05/09/2018    Essential hypertension 03/09/2018    GERD with esophagitis     EGD done 12/11/2018 with Dr. Mynor Montemayor    Intermittent chest pain 12/13/2023    Reported intermittent chest pain and excessive fatigue with activity; HOOPER  Went to see Dr. Jeferson Kaur, [facility] on 8/21/2023  EKG, Nuclear Stress test and CT Calcium Score Completed - unremarkable  Nuclear Stress Test 7/17/2023:  Impression  1. Small to moderate area of stress-induced ischemia in the anterolateral wall.  Addendum by Omar Low MD on 07/17/2023  4:48 PM CDT   ####    Pernicious anemia     per patient report    Prostate cancer 08/31/2018    prostate biopsy 5/14/2018 - no treatment required - being followed by [facility] Dr. Arana.    Urinary tract infection        Past Surgical History:   Procedure Laterality Date    COLONOSCOPY  05/24/2018    [facility] Dr. Mynor Montemayor--Polyps (6 mm) in ascending colon and hepatic flexure, Angioectasia in ascending colon, Internal Hemorrhoids.   Colonoscopy in 5 years     OTHER SURGICAL HISTORY      angiogram on wrist    PROSTATE BIOPSY  05/14/2018    done by Dr. Arana.    UPPER GASTROINTESTINAL ENDOSCOPY  12/11/2018    Dr. Montemayor - [facility] - + 7mm polyp in the cardia biopsied - hyperplastic polyp.  GERD with mild chronic esophagitis present.       Family History   Problem Relation Name Age of Onset    Cancer Paternal Grandmother      No Known Problems  Mother      Heart disease Father          passed age73    Hypertension Father      Hyperlipidemia Father      No Known Problems Sister      No Known Problems Sister      No Known Problems Sister      Kidney disease Neg Hx      Prostate cancer Neg Hx         Social History     Socioeconomic History    Marital status: Single   Occupational History    Occupation: sales   Tobacco Use    Smoking status: Never     Passive exposure: Never    Smokeless tobacco: Never   Substance and Sexual Activity    Alcohol use: Yes     Comment: every other weekend - 2 drinks per occasion    Drug use: No    Sexual activity: Yes     Partners: Female     Social Drivers of Health     Financial Resource Strain: Low Risk  (10/2/2024)    Overall Financial Resource Strain (CARDIA)     Difficulty of Paying Living Expenses: Not hard at all   Food Insecurity: No Food Insecurity (10/2/2024)    Hunger Vital Sign     Worried About Running Out of Food in the Last Year: Never true     Ran Out of Food in the Last Year: Never true   Physical Activity: Insufficiently Active (10/2/2024)    Exercise Vital Sign     Days of Exercise per Week: 2 days     Minutes of Exercise per Session: 20 min   Stress: Stress Concern Present (10/2/2024)    [employer] [place] [employer] - Occupational Stress Questionnaire     Feeling of Stress : To some extent   Housing Stability: Unknown (10/2/2024)    Housing Stability Vital Sign     Unable to Pay for Housing in the Last Year: No

## 2025-03-24 ENCOUNTER — OFFICE VISIT (OUTPATIENT)
Dept: FAMILY MEDICINE | Facility: CLINIC | Age: 65
End: 2025-03-24
Payer: MEDICARE

## 2025-03-24 VITALS
TEMPERATURE: 98 F | SYSTOLIC BLOOD PRESSURE: 120 MMHG | WEIGHT: 208.13 LBS | HEART RATE: 90 BPM | DIASTOLIC BLOOD PRESSURE: 76 MMHG | WEIGHT: 208.13 LBS | BODY MASS INDEX: 29.8 KG/M2 | DIASTOLIC BLOOD PRESSURE: 76 MMHG | SYSTOLIC BLOOD PRESSURE: 120 MMHG | BODY MASS INDEX: 29.8 KG/M2 | OXYGEN SATURATION: 99 % | HEIGHT: 70 IN | TEMPERATURE: 98 F | HEART RATE: 90 BPM | OXYGEN SATURATION: 99 % | HEIGHT: 70 IN

## 2025-03-24 DIAGNOSIS — Z86.39 HISTORY OF VITAMIN D DEFICIENCY: ICD-10-CM

## 2025-03-24 DIAGNOSIS — I10 ESSENTIAL HYPERTENSION: Primary | ICD-10-CM

## 2025-03-24 DIAGNOSIS — M54.12 CERVICAL RADICULOPATHY: ICD-10-CM

## 2025-03-24 DIAGNOSIS — Z86.0100 HISTORY OF COLONIC POLYPS: ICD-10-CM

## 2025-03-24 DIAGNOSIS — K51.40 PSEUDOPOLYPOSIS OF COLON WITHOUT COMPLICATION, UNSPECIFIED PART OF COLON: ICD-10-CM

## 2025-03-24 DIAGNOSIS — Z00.00 ENCOUNTER FOR MEDICARE ANNUAL WELLNESS EXAM: ICD-10-CM

## 2025-03-24 DIAGNOSIS — D64.9 CHRONIC ANEMIA: ICD-10-CM

## 2025-03-24 DIAGNOSIS — E78.2 MIXED HYPERLIPIDEMIA: ICD-10-CM

## 2025-03-24 DIAGNOSIS — Z00.00 ENCOUNTER FOR MEDICARE ANNUAL WELLNESS EXAM: Primary | ICD-10-CM

## 2025-03-24 DIAGNOSIS — I10 ESSENTIAL HYPERTENSION: ICD-10-CM

## 2025-03-24 DIAGNOSIS — I20.89 STABLE ANGINA: ICD-10-CM

## 2025-03-24 DIAGNOSIS — R73.01 IFG (IMPAIRED FASTING GLUCOSE): ICD-10-CM

## 2025-03-24 DIAGNOSIS — E66.3 OVERWEIGHT WITH BODY MASS INDEX (BMI) OF 29 TO 29.9 IN ADULT: ICD-10-CM

## 2025-03-24 DIAGNOSIS — C61 PROSTATE CANCER: ICD-10-CM

## 2025-03-24 DIAGNOSIS — N40.1 BENIGN NON-NODULAR PROSTATIC HYPERPLASIA WITH LOWER URINARY TRACT SYMPTOMS: ICD-10-CM

## 2025-03-24 DIAGNOSIS — K51.40 PSEUDOPOLYPOSIS OF COLON, UNSPECIFIED COMPLICATION STATUS, UNSPECIFIED PART OF COLON: ICD-10-CM

## 2025-03-24 DIAGNOSIS — D51.0 PERNICIOUS ANEMIA: ICD-10-CM

## 2025-03-24 PROBLEM — R97.20 ELEVATED PSA, LESS THAN 10 NG/ML: Status: RESOLVED | Noted: 2018-05-09 | Resolved: 2025-03-24

## 2025-03-24 PROCEDURE — 99999 PR PBB SHADOW E&M-EST. PATIENT-LVL III: CPT | Mod: PBBFAC,,, | Performed by: NURSE PRACTITIONER

## 2025-03-24 PROCEDURE — 99999 PR PBB SHADOW E&M-EST. PATIENT-LVL IV: CPT | Mod: PBBFAC,,, | Performed by: NURSE PRACTITIONER

## 2025-03-24 RX ORDER — ASCORBIC ACID 500 MG
500 TABLET ORAL
COMMUNITY

## 2025-03-24 RX ORDER — FERROUS SULFATE 325(65) MG
325 TABLET, DELAYED RELEASE (ENTERIC COATED) ORAL
COMMUNITY

## 2025-03-24 RX ORDER — AMLODIPINE AND VALSARTAN 5; 320 MG/1; MG/1
1 TABLET ORAL DAILY
Qty: 90 TABLET | Refills: 3 | Status: SHIPPED | OUTPATIENT
Start: 2025-03-24

## 2025-03-24 RX ORDER — ROSUVASTATIN CALCIUM 10 MG/1
10 TABLET, COATED ORAL DAILY
Qty: 90 TABLET | Refills: 3 | Status: SHIPPED | OUTPATIENT
Start: 2025-03-24

## 2025-03-24 RX ORDER — ACETAMINOPHEN 500 MG
2000 TABLET ORAL DAILY
COMMUNITY

## 2025-03-24 NOTE — ASSESSMENT & PLAN NOTE
Cervical Radiculopathy/Spondylosis   Followed by Dr. Cannon ([facility])  Had epidural injection to neck 3/23/22  Cervical Facet Joint CELIA C4,5,6 with Dr. Acevedo 3/19/2025  Followed by Dr. Moon requesting records for Cervical CT and Cervical MRI

## 2025-03-24 NOTE — ASSESSMENT & PLAN NOTE
Prostate Cancer   diagnosed around May 2018 when had prostate biopsy done but has not required any treatment.    PSA is checked every 6 months by Dr. Arana along with BPH  Follows with Dr. Arana 7/5/24-Who requests primary care to watch PSA level in between urology visits with the patient so that we have at least 2 PSA levels per year

## 2025-03-24 NOTE — PROGRESS NOTES
"Subjective:       Patient ID: Diallo Dawkins is a 64 y.o. male.    Chief Complaint: Follow-up (F/U Chronic conditions)        HPI WITH ASSESSMENT AND PLAN OF CARE:      Patient is a 63 year old white male with Stable Angina evaluated by Cardiology, Hypertension, Hyperlipidemia, IFG, Prostate Cancer with BPH followed by Dr. Arana, mild pernicious anemia, cervical radiculopathy  and chronic GERD followed by Dr. Montemayor that is here today for follow-up of chronic problems with fasting lab results.  Will have [place] in separate visit note.        Hypertension  takes Amlodine -Valsartan 5- 320 mg daily  Blood pressure is stable.  /76 (BP Location: Right arm, Patient Position: Sitting)   Pulse 90   Temp 98.1 °F (36.7 °C) (Temporal)   Ht 5' 10" (1.778 m)   Wt 94.4 kg (208 lb 1.8 oz)   SpO2 99%   BMI 29.86 kg/m²   Recheck in 6 months.        Hyperlipidemia  takes Rosuvastatin 10 mg daily.   LDL 59.8  Stable - stay on medication  Recheck in 1 year.               Intermittent Chest Pain/stable angina  Reported intermittent chest pain and excessive fatigue with activity; HOOPER in 2023  Went to see Dr. Jeferson Kaur, [facility] [place] Cardiologist on 8/21/2023  EKG, Nuclear Stress test and CT Calcium Score Completed - unremarkable  Nuclear Stress Test 7/17/2023:  Impression  1. Small to moderate area of stress-induced ischemia in the anterolateral wall.  Addendum by Omar Low MD on 07/17/2023  4:48 PM CDT   #### ADDENDUM #1 #####  Upon further review, there is no appreciable reversible perfusion defect.   The slight variation in intensity of activity in the anterolateral wall is   likely artifactual. There is no evidence for ischemia on this exam.   Findings are reviewed with Dr. Kaur.   CT CALCIUM SCORE 7/17/2023:  There is minimal eccentric calcified plaque in the proximal left anterior descending artery. There is minimal calcified plaque at the origin of the right coronary artery. No additional calcified " plaque is present. The Agatston score is 84.5. This places the patient in the 41st percentile for age and gender.   Per Dr. Kaur Cardiology progress notes 8/21/2023:  10 year risk 8.8 %. Patient's blood pressure is typically well controlled with systolic blood pressure in the 120's to 130's. Patient's lipids are at or near goal. I encouraged the patient to continue a low cholesterol diet and take their medications.  Exercise  Last Cardiology appt 8/21/2024 Dr. Kaur:  No changes  Recheck in 1 year.        Prostate Cancer   diagnosed around May 2018 when had prostate biopsy done but has not required any treatment.    PSA is checked every 6 months by Dr. Arana along with BPH  Follows with Dr. Arana 7/5/24-Who requests primary care to watch PSA level in between urology visits with the patient so that we have at least 2 PSA levels per year                   Chronic Anemia/Family report of Pernicious Anemia  History of Chronic Anemia  Anemia workup in 2018:  His iron and Ferritin levels were normal.    His Vitamin D level was normal but on low end of normal at 35.    His Vitamin B12 level was also normal at 386 but on the lower end of normal - recommended daily supplementation at April 2018 visit.  Patient has been taking his B12 1000 mcg and Vitamin D 1000 unit supplements.  Anemia STABLE  Vit D - 38  Vitamin b12-834  Advised patient to take VITAMIN D# 2000 units every day  DECREASE the Vitamin B12 1000 mcg couple times per week               Impaired Fasting Glucose   with HgbA1C of 5.1% at May 2018 visit   See updated levels below.  Continue to recommended stricter lifestyle modifications              Overweight  Body mass index is 29.86 kg/m².  Working on lifestyle modifications        Cervical Radiculopathy/Spondylosis   Followed by Dr. Cannon ([facility] group)  Had epidural injection to neck 3/23/22  Cervical Facet Joint CELIA C4,5,6 with Dr. Acevedo 3/19/2025  Followed by Dr. Moon requesting records for  Cervical CT and Cervical MRI        Personal History of Colon Polyps  Followed by GI MD Montemayor  Last colonoscopy 7/24/2023 - + polyp   Repeat in 5 years.       Wellness Labs  CBC Mild Anemia Present  CMP okay kidney and liver function WNL  Cholesterol controlled on medication  TSH WNL  A1C 5.6%  PSA WNL      Health Maintenance  Up to date      Lab Visit on 03/18/2025   Component Date Value Ref Range Status    PSA Total 03/18/2025 1.5  0.00 - 4.00 ng/mL Final    Comment: The testing method is a chemiluminescent microparticle immunoassay   manufactured by [employer] and performed on the [employer]   or   [employer]MCOD[employer] system. Values obtained with different assay manufacturers   for   methods may be different and cannot be used interchangeably.  PSA Expected levels:  Hormonal Therapy: <0.05 ng/ml  Prostatectomy: <0.01 ng/ml  Radiation Therapy: <1.00 ng/ml      PSA, Free 03/18/2025 0.88  0.00 - 1.50 ng/mL Final    Comment: The testing method is a chemiluminescent microparticle immunoassay   manufactured by [employer] and performed on the [employer]   or   [employer] system. Values obtained with different assay manufacturers   for   methods may be different and cannot be used interchangeably.      PSA, Free % 03/18/2025 58.67  Not established % Final   Lab Visit on 03/18/2025   Component Date Value Ref Range Status    WBC 03/18/2025 4.33  3.90 - 12.70 K/uL Final    RBC 03/18/2025 4.22 (L)  4.60 - 6.20 M/uL Final    Hemoglobin 03/18/2025 13.4 (L)  14.0 - 18.0 g/dL Final    Hematocrit 03/18/2025 37.5 (L)  40.0 - 54.0 % Final    MCV 03/18/2025 89  82 - 98 fL Final    MCH 03/18/2025 31.8 (H)  27.0 - 31.0 pg Final    MCHC 03/18/2025 35.7  32.0 - 36.0 g/dL Final    RDW 03/18/2025 13.3  11.5 - 14.5 % Final    Platelets 03/18/2025 184  150 - 450 K/uL Final    MPV 03/18/2025 9.9  9.2 - 12.9 fL Final    Immature Granulocytes 03/18/2025 0.2  0.0 - 0.5 % Final    Gran # (ANC) 03/18/2025 2.3  1.8 - 7.7 K/uL Final     Immature Grans (Abs) 03/18/2025 0.01  0.00 - 0.04 K/uL Final    Comment: Mild elevation in immature granulocytes is non specific and   can be seen in a variety of conditions including stress response,   acute inflammation, trauma and pregnancy. Correlation with other   laboratory and clinical findings is essential.      Lymph # 03/18/2025 1.5  1.0 - 4.8 K/uL Final    Mono # 03/18/2025 0.4  0.3 - 1.0 K/uL Final    Eos # 03/18/2025 0.2  0.0 - 0.5 K/uL Final    Baso # 03/18/2025 0.03  0.00 - 0.20 K/uL Final    nRBC 03/18/2025 0  0 /100 WBC Final    Gran % 03/18/2025 53.3  38.0 - 73.0 % Final    Lymph % 03/18/2025 33.5  18.0 - 48.0 % Final    Mono % 03/18/2025 8.8  4.0 - 15.0 % Final    Eosinophil % 03/18/2025 3.7  0.0 - 8.0 % Final    Basophil % 03/18/2025 0.7  0.0 - 1.9 % Final    Differential Method 03/18/2025 Automated   Final    Sodium 03/18/2025 140  136 - 145 mmol/L Final    Potassium 03/18/2025 4.3  3.5 - 5.1 mmol/L Final    Chloride 03/18/2025 108  95 - 110 mmol/L Final    CO2 03/18/2025 22 (L)  23 - 29 mmol/L Final    Glucose 03/18/2025 128 (H)  70 - 110 mg/dL Final    BUN 03/18/2025 16  8 - 23 mg/dL Final    Creatinine 03/18/2025 1.0  0.5 - 1.4 mg/dL Final    Calcium 03/18/2025 9.3  8.7 - 10.5 mg/dL Final    Total Protein 03/18/2025 7.5  6.0 - 8.4 g/dL Final    Albumin 03/18/2025 4.1  3.5 - 5.2 g/dL Final    Total Bilirubin 03/18/2025 0.6  0.1 - 1.0 mg/dL Final    Comment: For infants and newborns, interpretation of results should be based  on gestational age, weight and in agreement with clinical  observations.    Premature Infant recommended reference ranges:  Up to 24 hours.............<8.0 mg/dL  Up to 48 hours............<12.0 mg/dL  3-5 days..................<15.0 mg/dL  6-29 days.................<15.0 mg/dL      Alkaline Phosphatase 03/18/2025 49  40 - 150 U/L Final    AST 03/18/2025 22  10 - 40 U/L Final    ALT 03/18/2025 24  10 - 44 U/L Final    eGFR 03/18/2025 >60.0  >60 mL/min/1.73 m^2 Final     Anion Gap 03/18/2025 10  8 - 16 mmol/L Final    Hemoglobin A1C 03/18/2025 5.6  4.0 - 5.6 % Final    Comment: ADA Screening Guidelines:  5.7-6.4%  Consistent with prediabetes  >or=6.5%  Consistent with diabetes    High levels of fetal hemoglobin interfere with the HbA1C  assay. Heterozygous hemoglobin variants (HbS, HgC, etc)do  not significantly interfere with this assay.   However, presence of multiple variants may affect accuracy.      Estimated Avg Glucose 03/18/2025 114  68 - 131 mg/dL Final    Cholesterol 03/18/2025 111 (L)  120 - 199 mg/dL Final    Comment: The National Cholesterol Education Program (NCEP) has set the  following guidelines (reference ranges) for Cholesterol:  Optimal.....................<200 mg/dL  Borderline High.............200-239 mg/dL  High........................> or = 240 mg/dL      Triglycerides 03/18/2025 96  30 - 150 mg/dL Final    Comment: The National Cholesterol Education Program (NCEP) has set the  following guidelines (reference values) for triglycerides:  Normal......................<150 mg/dL  Borderline High.............150-199 mg/dL  High........................200-499 mg/dL      HDL 03/18/2025 32 (L)  40 - 75 mg/dL Final    Comment: The National Cholesterol Education Program (NCEP) has set the  following guidelines (reference values) for HDL Cholesterol:  Low...............<40 mg/dL  Optimal...........>60 mg/dL      LDL Cholesterol 03/18/2025 59.8 (L)  63.0 - 159.0 mg/dL Final    Comment: The National Cholesterol Education Program (NCEP) has set the  following guidelines (reference values) for LDL Cholesterol:  Optimal.......................<130 mg/dL  Borderline High...............130-159 mg/dL  High..........................160-189 mg/dL  Very High.....................>190 mg/dL      HDL/Cholesterol Ratio 03/18/2025 28.8  20.0 - 50.0 % Final    Total Cholesterol/HDL Ratio 03/18/2025 3.5  2.0 - 5.0 Final    Non-HDL Cholesterol 03/18/2025 79  mg/dL Final    Comment: Risk  "category and Non-HDL cholesterol goals:  Coronary heart disease (CHD)or equivalent (10-year risk of CHD >20%):  Non-HDL cholesterol goal     <130 mg/dL  Two or more CHD risk factors and 10-year risk of CHD <= 20%:  Non-HDL cholesterol goal     <160 mg/dL  0 to 1 CHD risk factor:  Non-HDL cholesterol goal     <190 mg/dL      Vit D, 25-Hydroxy 03/18/2025 38  30 - 96 ng/mL Final    Comment: Vitamin D deficiency.........<10 ng/mL                              Vitamin D insufficiency......10-29 ng/mL       Vitamin D sufficiency........> or equal to 30 ng/mL  Vitamin D toxicity............>100 ng/mL      TSH 03/18/2025 3.310  0.400 - 4.000 uIU/mL Final    Vitamin B-12 03/18/2025 834  210 - 950 pg/mL Final         Vitals:    03/24/25 1610   BP: 120/76   BP Location: Right arm   Patient Position: Sitting   Pulse: 90   Temp: 98.1 °F (36.7 °C)   TempSrc: Temporal   SpO2: 99%   Weight: 94.4 kg (208 lb 1.8 oz)   Height: 5' 10" (1.778 m)         Diagnoses this Encounter:         ICD-10-CM ICD-9-CM   1. Essential hypertension  I10 401.9   2. Mixed hyperlipidemia  E78.2 272.2   3. Stable angina  I20.89 413.9   4. Prostate cancer  C61 185   5. Chronic anemia  D64.9 285.9   6. Pernicious anemia  D51.0 281.0   7. History of vitamin D deficiency  Z86.39 V12.1   8. IFG (impaired fasting glucose)  R73.01 790.21   9. Overweight with body mass index (BMI) of 29 to 29.9 in adult  E66.3 278.02    Z68.29 V85.25   10. Cervical radiculopathy  M54.12 723.4   11. History of colonic polyps  Z86.0100 V12.72   12. Pseudopolyposis of colon without complication, unspecified part of colon  K51.40 556.4       Orders Placed This Encounter    Comprehensive Metabolic Panel    Hemoglobin A1C    rosuvastatin (CRESTOR) 10 MG tablet    amlodipine-valsartan (EXFORGE) 5-320 mg per tablet        Follow up in about 6 months (around 9/24/2025) for fasting labs and follow up.     Patient's Medications   New Prescriptions    No medications on file   Previous " Medications    ASCORBIC ACID, VITAMIN C, (VITAMIN C) 500 MG TABLET    Take 500 mg by mouth 3 (three) times a week.    CHOLECALCIFEROL, VITAMIN D3, (D3-2000) 50 MCG (2,000 UNIT) CAP CAPSULE    Take 2,000 Units by mouth once daily.    CYANOCOBALAMIN (VITAMIN B-12) 500 MCG TABLET    Take 1 tablet (500 mcg total) by mouth once daily.    FERROUS SULFATE 325 (65 FE) MG EC TABLET    Take 325 mg by mouth 3 (three) times a week.   Modified Medications    Modified Medication Previous Medication    AMLODIPINE-VALSARTAN (EXFORGE) 5-320 MG PER TABLET amlodipine-valsartan (EXFORGE) 5-320 mg per tablet       Take 1 tablet by mouth once daily.    TAKE 1 TABLET BY MOUTH EVERY DAY    ROSUVASTATIN (CRESTOR) 10 MG TABLET rosuvastatin (CRESTOR) 10 MG tablet       Take 1 tablet (10 mg total) by mouth once daily.    TAKE 1 TABLET BY MOUTH EVERY DAY   Discontinued Medications    CHOLECALCIFEROL, VITAMIN D3, (VITAMIN D3) 25 MCG (1,000 UNIT) CAPSULE    Take 1 capsule (1,000 Units total) by mouth once daily.         Review of Systems   Constitutional: Negative.    HENT: Negative.     Eyes: Negative.    Respiratory: Negative.     Cardiovascular: Negative.    Gastrointestinal: Negative.    Endocrine: Negative.    Genitourinary: Negative.    Musculoskeletal: Negative.    Psychiatric/Behavioral: Negative.           Objective:        Physical Exam  Constitutional:       General: He is not in acute distress.     Appearance: Normal appearance. He is not ill-appearing or toxic-appearing.      Comments: Body mass index is 29.86 kg/m².     HENT:      Right Ear: Ear canal and external ear normal. There is impacted cerumen.      Left Ear: Tympanic membrane, ear canal and external ear normal.      Nose: Nose normal.      Mouth/Throat:      Pharynx: Oropharynx is clear.   Eyes:      Extraocular Movements: Extraocular movements intact.      Conjunctiva/sclera: Conjunctivae normal.   Cardiovascular:      Rate and Rhythm: Normal rate and regular rhythm.       Pulses: Normal pulses.      Heart sounds: Normal heart sounds.   Pulmonary:      Effort: Pulmonary effort is normal.      Breath sounds: Normal breath sounds.   Abdominal:      General: Bowel sounds are normal. There is no distension.      Palpations: Abdomen is soft. There is no mass.      Tenderness: There is no abdominal tenderness. There is no guarding.      Hernia: No hernia is present.   Musculoskeletal:         General: Normal range of motion.   Lymphadenopathy:      Cervical: No cervical adenopathy.   Skin:     General: Skin is warm and dry.      Capillary Refill: Capillary refill takes more than 3 seconds.   Neurological:      Mental Status: He is alert and oriented to person, place, and time.   Psychiatric:         Mood and Affect: Mood normal.         Behavior: Behavior normal.             Past Medical History:   Diagnosis Date    Benign non-nodular prostatic hyperplasia with lower urinary tract symptoms 07/19/2016    Followed by Dr. Arana    Cervical radiculopathy 03/07/2022    [facility] - had epidural steroid on 3/23/22    Cervical spondylosis 2017    orthopedic - Dr. Guillaume MARTINEZ-19 virus detected 10/18/2020    [facility].    Elevated PSA, less than 10 ng/ml 05/09/2018    Essential hypertension 03/09/2018    GERD with esophagitis     EGD done 12/11/2018 with Dr. Mynor Montemayor    Intermittent chest pain 12/13/2023    Reported intermittent chest pain and excessive fatigue with activity; HOOPER  Went to see Dr. Jeferson Kaur, [facility] [place] Cardiologist on 8/21/2023  EKG, Nuclear Stress test and CT Calcium Score Completed - unremarkable  Nuclear Stress Test 7/17/2023:  Impression  1. Small to moderate area of stress-induced ischemia in the anterolateral wall.  Addendum by Omar Low MD on 07/17/2023  4:48 PM CDT   ####    Pernicious anemia     per patient report    Prostate cancer 08/31/2018    prostate biopsy 5/14/2018 - no treatment required - being  followed by [facility] Dr. Arana.    Urinary tract infection        Past Surgical History:   Procedure Laterality Date    COLONOSCOPY  05/24/2018    [facility] Dr. Mynor Montemayor--Polyps (6 mm) in ascending colon and hepatic flexure, Angioectasia in ascending colon, Internal Hemorrhoids.   Colonoscopy in 5 years     INJECTION OF ANESTHETIC AGENT INTO CERVICAL FACET JOINT Left 03/19/2025    C4,5,6 Dr. Acevedo    OTHER SURGICAL HISTORY      angiogram on wrist    PROSTATE BIOPSY  05/14/2018    done by Dr. Arana.    UPPER GASTROINTESTINAL ENDOSCOPY  12/11/2018    Dr. Montemayor - [facility] - + 7mm polyp in the cardia biopsied - hyperplastic polyp.  GERD with mild chronic esophagitis present.       Family History   Problem Relation Name Age of Onset    Cancer Paternal Grandmother      No Known Problems Mother      Heart disease Father          passed age75    Hypertension Father      Hyperlipidemia Father      No Known Problems Sister      No Known Problems Sister      No Known Problems Sister      Kidney disease Neg Hx      Prostate cancer Neg Hx         Social History     Socioeconomic History    Marital status: Single   Occupational History    Occupation: sales   Tobacco Use    Smoking status: Never     Passive exposure: Never    Smokeless tobacco: Never   Substance and Sexual Activity    Alcohol use: Yes     Comment: every other weekend - 2 drinks per occasion    Drug use: No    Sexual activity: Yes     Partners: Female     Social Drivers of Health     Financial Resource Strain: Low Risk  (3/21/2025)    Overall Financial Resource Strain (CARDIA)     Difficulty of Paying Living Expenses: Not hard at all   Food Insecurity: No Food Insecurity (3/21/2025)    Hunger Vital Sign     Worried About Running Out of Food in the Last Year: Never true     Ran Out of Food in the Last Year: Never true   Transportation Needs: No Transportation Needs (3/21/2025)    PRAPARE - Transportation     Lack of Transportation  (Medical): No     Lack of Transportation (Non-Medical): No   Physical Activity: Inactive (3/21/2025)    Exercise Vital Sign     Days of Exercise per Week: 0 days     Minutes of Exercise per Session: 0 min   Stress: Stress Concern Present (3/21/2025)    [employer] [place] [employer] - Occupational Stress Questionnaire     Feeling of Stress : To some extent   Housing Stability: Low Risk  (3/21/2025)    Housing Stability Vital Sign     Unable to Pay for Housing in the Last Year: No     Number of Times Moved in the Last Year: 0     Homeless in the Last Year: No

## 2025-03-24 NOTE — PROGRESS NOTES
"  Diallo Dawkins presented for a follow-up Medicare AWV today. The following components were reviewed and updated:    Medical history  Family History  Social history  Allergies and Current Medications  Health Risk Assessment  Health Maintenance  Care Team    **See Completed Assessments for Annual Wellness visit with in the encounter summary    The following assessments were completed:  Depression Screening  Cognitive function Screening  Timed Get Up Test  Whisper Test      Opioid documentation:      Patient does not have a current opioid prescription.          Vitals:    03/24/25 1605   BP: 120/76   BP Location: Right arm   Patient Position: Sitting   Pulse: 90   Temp: 98.1 °F (36.7 °C)   TempSrc: Temporal   SpO2: 99%   Weight: 94.4 kg (208 lb 1.8 oz)   Height: 5' 10" (1.778 m)     Body mass index is 29.86 kg/m².       Physical Exam  Constitutional:       General: He is not in acute distress.     Appearance: Normal appearance. He is not toxic-appearing or diaphoretic.      Comments: Body mass index is 29.86 kg/m².     HENT:      Nose: No congestion.      Mouth/Throat:      Pharynx: No posterior oropharyngeal erythema.   Eyes:      Extraocular Movements: Extraocular movements intact.      Conjunctiva/sclera: Conjunctivae normal.   Cardiovascular:      Rate and Rhythm: Normal rate and regular rhythm.      Heart sounds: Normal heart sounds. No murmur heard.  Pulmonary:      Effort: Pulmonary effort is normal. No respiratory distress.      Breath sounds: Normal breath sounds.   Abdominal:      General: Bowel sounds are normal. There is no distension.      Palpations: Abdomen is soft. There is no mass.      Tenderness: There is no abdominal tenderness. There is no guarding.   Musculoskeletal:         General: Normal range of motion.      Cervical back: Normal range of motion.      Right lower leg: No edema.      Left lower leg: No edema.   Lymphadenopathy:      Cervical: No cervical adenopathy.   Skin:     General: Skin " is warm and dry.   Neurological:      Mental Status: He is alert.   Psychiatric:         Mood and Affect: Mood normal.         Behavior: Behavior normal.           Diagnoses and health risks identified today and associated recommendations/orders:  1. Encounter for Medicare annual wellness exam    2. Prostate cancer  Assessment & Plan:  diagnosed around May 2018 when had prostate biopsy done but has not required any treatment.    PSA is checked every 6 months by Dr. Arana along with BPH  Follows with Dr. Arana 7/5/24-Who requests primary care to watch PSA level in between urology visits with the patient so that we have at least 2 PSA levels per year        3. Benign non-nodular prostatic hyperplasia with lower urinary tract symptoms  Assessment & Plan:  Prostate Cancer   diagnosed around May 2018 when had prostate biopsy done but has not required any treatment.    PSA is checked every 6 months by Dr. Arana along with BPH  Follows with Dr. Arana 7/5/24-Who requests primary care to watch PSA level in between urology visits with the patient so that we have at least 2 PSA levels per year      4. Stable angina  Assessment & Plan:  Intermittent Chest Pain/stable angina  Reported intermittent chest pain and excessive fatigue with activity; HOOPER in 2023  [place] [facility] [place]  EKG, Nuclear Stress test and CT Calcium Score Completed - unremarkable  Nuclear Stress Test 7/17/2023:  Impression  1. Small to moderate area of stress-induced ischemia in the anterolateral wall.  Addendum by Omar Low MD on 07/17/2023  4:48 PM CDT   #### ADDENDUM #1 #####  Upon further review, there is no appreciable reversible perfusion defect.   The slight variation in intensity of activity in the anterolateral wall is   likely artifactual. There is no evidence for ischemia on this exam.   Findings are reviewed with Dr. Kaur.   CT CALCIUM SCORE 7/17/2023:  There is minimal eccentric calcified plaque  "in the proximal left anterior descending artery. There is minimal calcified plaque at the origin of the right coronary artery. No additional calcified plaque is present. The Agatston score is 84.5. This places the patient in the 41st percentile for age and gender.   Per Dr. Kaur Cardiology progress notes 8/21/2023:  10 year risk 8.8 %. Patient's blood pressure is typically well controlled with systolic blood pressure in the 120's to 130's. Patient's lipids are at or near goal. I encouraged the patient to continue a low cholesterol diet and take their medications.  Exercise  Last Cardiology appt 8/21/2024 Dr. Kaur:  No changes  Recheck in 1 year.      5. Essential hypertension  Assessment & Plan:  takes Amlodine -Valsartan 5- 320 mg daily  Blood pressure is stable.  /76 (BP Location: Right arm, Patient Position: Sitting)   Pulse 90   Temp 98.1 °F (36.7 °C) (Temporal)   Ht 5' 10" (1.778 m)   Wt 94.4 kg (208 lb 1.8 oz)   SpO2 99%   BMI 29.86 kg/m²   Recheck in 6 months.      6. Mixed hyperlipidemia  Assessment & Plan:  takes Rosuvastatin 10 mg daily.   LDL 59.8  Stable - stay on medication  Recheck in 1 year.           7. Overweight with body mass index (BMI) of 29 to 29.9 in adult  Assessment & Plan:  Body mass index is 29.86 kg/m².  Working on lifestyle modifications      8. Chronic anemia  Assessment & Plan:  History of Chronic Anemia  Anemia workup in 2018:  His iron and Ferritin levels were normal.    His Vitamin D level was normal but on low end of normal at 35.    His Vitamin B12 level was also normal at 386 but on the lower end of normal - recommended daily supplementation at April 2018 visit.  Patient has been taking his B12 1000 mcg and Vitamin D 1000 unit supplements.  Anemia STABLE  Vit D - 38  Vitamin b12-834  Advised patient to take VITAMIN D# 2000 units every day  DECREASE the Vitamin B12 1000 mcg couple times per week           9. IFG (impaired fasting glucose)  Assessment & Plan:   " with HgbA1C of 5.1% at May 2018 visit   See updated levels below.  Continue to recommended stricter lifestyle modifications         10. Cervical radiculopathy  Assessment & Plan:  Cervical Radiculopathy/Spondylosis   Followed by Dr. Cannon ([facility])  Had epidural injection to neck 3/23/22  Cervical Facet Joint CELIA C4,5,6 with Dr. Acevedo 3/19/2025  Followed by Dr. Moon requesting records for Cervical CT and Cervical MRI      11. History of colonic polyps  Assessment & Plan:  Followed by GI MD Montemayor  Last colonoscopy 7/24/2023 - + polyp   Repeat in 5 years.         12. Pseudopolyposis of colon, unspecified complication status, unspecified part of colon  Assessment & Plan:  Noted on colonoscopy report per Dr. Montemayor on colonoscopy done 7/24/2023           Provided Diallo with a 5-10 year written screening schedule and personal prevention plan. Recommendations were developed using the USPSTF age appropriate recommendations. Education, counseling, and referrals were provided as needed.  After Visit Summary printed and given to patient which includes a list of additional screenings\tests needed.    Follow up in about 6 months (around 9/24/2025) for fasting labs and follow up with me as PCP for chronic health problems..      Keya Castro, SULLY

## 2025-03-24 NOTE — ASSESSMENT & PLAN NOTE
Intermittent Chest Pain/stable angina  Reported intermittent chest pain and excessive fatigue with activity; HOOPER in 2023  Went to see Dr. Jeferson Kaur, [facility] [place] [facility] on 8/21/2023  EKG, Nuclear Stress test and CT Calcium Score Completed - unremarkable  Nuclear Stress Test 7/17/2023:  Impression  1. Small to moderate area of stress-induced ischemia in the anterolateral wall.  Addendum by Omar Low MD on 07/17/2023  4:48 PM CDT   #### ADDENDUM #1 #####  Upon further review, there is no appreciable reversible perfusion defect.   The slight variation in intensity of activity in the anterolateral wall is   likely artifactual. There is no evidence for ischemia on this exam.   Findings are reviewed with Dr. Kaur.   CT CALCIUM SCORE 7/17/2023:  There is minimal eccentric calcified plaque in the proximal left anterior descending artery. There is minimal calcified plaque at the origin of the right coronary artery. No additional calcified plaque is present. The Agatston score is 84.5. This places the patient in the 41st percentile for age and gender.   Per Dr. Kaur Cardiology progress notes 8/21/2023:  10 year risk 8.8 %. Patient's blood pressure is typically well controlled with systolic blood pressure in the 120's to 130's. Patient's lipids are at or near goal. I encouraged the patient to continue a low cholesterol diet and take their medications.  Exercise  Last Cardiology appt 8/21/2024 Dr. Kaur:  No changes  Recheck in 1 year.

## 2025-03-24 NOTE — ASSESSMENT & PLAN NOTE
diagnosed around May 2018 when had prostate biopsy done but has not required any treatment.    PSA is checked every 6 months by Dr. Arana along with BPH  Follows with Dr. Arana 7/5/24-Who requests primary care to watch PSA level in between urology visits with the patient so that we have at least 2 PSA levels per year

## 2025-03-24 NOTE — ASSESSMENT & PLAN NOTE
"takes Amlodine -Valsartan 5- 320 mg daily  Blood pressure is stable.  /76 (BP Location: Right arm, Patient Position: Sitting)   Pulse 90   Temp 98.1 °F (36.7 °C) (Temporal)   Ht 5' 10" (1.778 m)   Wt 94.4 kg (208 lb 1.8 oz)   SpO2 99%   BMI 29.86 kg/m²   Recheck in 6 months.  "

## 2025-03-24 NOTE — ASSESSMENT & PLAN NOTE
History of Chronic Anemia  Anemia workup in 2018:  His iron and Ferritin levels were normal.    His Vitamin D level was normal but on low end of normal at 35.    His Vitamin B12 level was also normal at 386 but on the lower end of normal - recommended daily supplementation at April 2018 visit.  Patient has been taking his B12 1000 mcg and Vitamin D 1000 unit supplements.  Anemia STABLE  Vit D - 38  Vitamin b12-834  Advised patient to take VITAMIN D# 2000 units every day  DECREASE the Vitamin B12 1000 mcg couple times per week

## 2025-03-24 NOTE — PATIENT INSTRUCTIONS
Counseling and Referral of Other Preventative  (Italic type indicates deductible and co-insurance are waived)    Patient Name: Diallo Dawkins  Today's Date: 3/24/2025    Health Maintenance       Date Due Completion Date    Pneumococcal Vaccines (Age 50+) (3 of 3 - PPSV23, PCV20 or PCV21) 06/02/2025 6/2/2020    Hemoglobin A1c (Prediabetes) 03/18/2026 3/18/2025    High Dose Statin 03/24/2026 3/24/2025    TETANUS VACCINE 01/23/2027 1/23/2017    Colorectal Cancer Screening 07/24/2028 7/24/2023    Override on 1/1/2011: Done (Had polyps, done by MD at )    Lipid Panel 03/18/2030 3/18/2025        No orders of the defined types were placed in this encounter.      The following information is provided to all patients.  This information is to help you find resources for any of the problems found today that may be affecting your health:                  Living healthy guide: www.[facility].[place].gov      Understanding Diabetes: www.diabetes.org      Eating healthy: www.cdc.gov/healthyweight      CDC home safety checklist: www.cdc.gov/steadi/patient.html      Agency on Aging: www.goea.[place].gov      Alcoholics anonymous (AA): www.aa.org      Physical Activity: www.meri.nih.gov/wj0pvct      Tobacco use: www.quitwithusla.org

## 2025-03-24 NOTE — ASSESSMENT & PLAN NOTE
with HgbA1C of 5.1% at May 2018 visit   See updated levels below.  Continue to recommended stricter lifestyle modifications

## 2025-03-28 ENCOUNTER — TELEPHONE (OUTPATIENT)
Dept: UROLOGY | Facility: CLINIC | Age: 65
End: 2025-03-28
Payer: MEDICARE

## 2025-03-28 ENCOUNTER — RESULTS FOLLOW-UP (OUTPATIENT)
Dept: UROLOGY | Facility: CLINIC | Age: 65
End: 2025-03-28

## 2025-03-28 NOTE — TELEPHONE ENCOUNTER
I called and gave patient result note, interpretation and provider plan of care. Patient verbalized understanding of everything discussed.

## 2025-03-28 NOTE — TELEPHONE ENCOUNTER
----- Message from Maxx Arana MD sent at 3/27/2025 10:29 PM CDT -----  PSA  is elevated slightly. Repeat in 6 months  ----- Message -----  From: Niall, [facility]  Sent: 3/18/2025   4:12 PM CDT  To: Maxx Arana MD

## 2025-04-03 ENCOUNTER — PATIENT OUTREACH (OUTPATIENT)
Dept: ADMINISTRATIVE | Facility: HOSPITAL | Age: 65
End: 2025-04-03
Payer: MEDICARE

## 2025-04-03 NOTE — PROGRESS NOTES
Health Maintenance Topic(s) Outreach Outcomes & Actions Taken:    Colorectal Cancer Screening - Outreach Outcomes & Actions Taken  : External Records Uploaded, Care Team Updated, & History Updated if Applicable

## 2025-08-11 ENCOUNTER — OFFICE VISIT (OUTPATIENT)
Dept: FAMILY MEDICINE | Facility: CLINIC | Age: 65
End: 2025-08-11
Payer: MEDICARE

## 2025-08-11 ENCOUNTER — TELEPHONE (OUTPATIENT)
Dept: FAMILY MEDICINE | Facility: CLINIC | Age: 65
End: 2025-08-11
Payer: MEDICARE

## 2025-08-11 VITALS
TEMPERATURE: 98 F | OXYGEN SATURATION: 98 % | DIASTOLIC BLOOD PRESSURE: 72 MMHG | BODY MASS INDEX: 28.91 KG/M2 | WEIGHT: 201.94 LBS | HEART RATE: 91 BPM | SYSTOLIC BLOOD PRESSURE: 124 MMHG | HEIGHT: 70 IN

## 2025-08-11 DIAGNOSIS — J01.10 ACUTE NON-RECURRENT FRONTAL SINUSITIS: Primary | ICD-10-CM

## 2025-08-11 DIAGNOSIS — I10 ESSENTIAL HYPERTENSION: ICD-10-CM

## 2025-08-11 DIAGNOSIS — R05.9 COUGH, UNSPECIFIED TYPE: ICD-10-CM

## 2025-08-11 DIAGNOSIS — U07.1 COVID-19: ICD-10-CM

## 2025-08-11 LAB
CTP QC/QA: YES
SARS-COV-2 RDRP RESP QL NAA+PROBE: POSITIVE

## 2025-08-11 PROCEDURE — 99999 PR PBB SHADOW E&M-EST. PATIENT-LVL III: CPT | Mod: PBBFAC,,, | Performed by: NURSE PRACTITIONER

## 2025-08-11 PROCEDURE — 3074F SYST BP LT 130 MM HG: CPT | Mod: CPTII,S$GLB,, | Performed by: NURSE PRACTITIONER

## 2025-08-11 PROCEDURE — 3044F HG A1C LEVEL LT 7.0%: CPT | Mod: CPTII,S$GLB,, | Performed by: NURSE PRACTITIONER

## 2025-08-11 PROCEDURE — 1160F RVW MEDS BY RX/DR IN RCRD: CPT | Mod: CPTII,S$GLB,, | Performed by: NURSE PRACTITIONER

## 2025-08-11 PROCEDURE — 1126F AMNT PAIN NOTED NONE PRSNT: CPT | Mod: CPTII,S$GLB,, | Performed by: NURSE PRACTITIONER

## 2025-08-11 PROCEDURE — 99214 OFFICE O/P EST MOD 30 MIN: CPT | Mod: S$GLB,,, | Performed by: NURSE PRACTITIONER

## 2025-08-11 PROCEDURE — 3078F DIAST BP <80 MM HG: CPT | Mod: CPTII,S$GLB,, | Performed by: NURSE PRACTITIONER

## 2025-08-11 PROCEDURE — 3288F FALL RISK ASSESSMENT DOCD: CPT | Mod: CPTII,S$GLB,, | Performed by: NURSE PRACTITIONER

## 2025-08-11 PROCEDURE — 4010F ACE/ARB THERAPY RXD/TAKEN: CPT | Mod: CPTII,S$GLB,, | Performed by: NURSE PRACTITIONER

## 2025-08-11 PROCEDURE — 87635 SARS-COV-2 COVID-19 AMP PRB: CPT | Mod: QW,S$GLB,, | Performed by: NURSE PRACTITIONER

## 2025-08-11 PROCEDURE — 3008F BODY MASS INDEX DOCD: CPT | Mod: CPTII,S$GLB,, | Performed by: NURSE PRACTITIONER

## 2025-08-11 PROCEDURE — 1159F MED LIST DOCD IN RCRD: CPT | Mod: CPTII,S$GLB,, | Performed by: NURSE PRACTITIONER

## 2025-08-11 PROCEDURE — 1101F PT FALLS ASSESS-DOCD LE1/YR: CPT | Mod: CPTII,S$GLB,, | Performed by: NURSE PRACTITIONER

## 2025-08-11 RX ORDER — AMOXICILLIN AND CLAVULANATE POTASSIUM 875; 125 MG/1; MG/1
1 TABLET, FILM COATED ORAL 2 TIMES DAILY
Qty: 14 TABLET | Refills: 0 | Status: SHIPPED | OUTPATIENT
Start: 2025-08-11

## 2025-08-11 RX ORDER — CODEINE PHOSPHATE AND GUAIFENESIN 10; 100 MG/5ML; MG/5ML
10 SOLUTION ORAL NIGHTLY PRN
Qty: 120 ML | Refills: 0 | Status: SHIPPED | OUTPATIENT
Start: 2025-08-11

## 2025-08-11 RX ORDER — AMLODIPINE AND VALSARTAN 5; 320 MG/1; MG/1
1 TABLET ORAL DAILY
Qty: 90 TABLET | Refills: 3 | Status: SHIPPED | OUTPATIENT
Start: 2025-08-11

## 2025-08-11 RX ORDER — LEVOCETIRIZINE DIHYDROCHLORIDE 5 MG/1
5 TABLET, FILM COATED ORAL
COMMUNITY
Start: 2025-08-09

## 2025-08-11 RX ORDER — FLUTICASONE PROPIONATE 50 MCG
1 SPRAY, SUSPENSION (ML) NASAL DAILY
Qty: 16 ML | Refills: 0 | Status: SHIPPED | OUTPATIENT
Start: 2025-08-11

## 2025-08-13 DIAGNOSIS — I10 ESSENTIAL HYPERTENSION: ICD-10-CM

## 2025-08-13 RX ORDER — AMLODIPINE AND VALSARTAN 5; 320 MG/1; MG/1
1 TABLET ORAL DAILY
Qty: 90 TABLET | Refills: 3 | Status: CANCELLED | OUTPATIENT
Start: 2025-08-13

## 2025-08-27 ENCOUNTER — OFFICE VISIT (OUTPATIENT)
Dept: FAMILY MEDICINE | Facility: CLINIC | Age: 65
End: 2025-08-27
Payer: MEDICARE

## 2025-08-27 VITALS
HEART RATE: 70 BPM | HEIGHT: 70 IN | BODY MASS INDEX: 28.89 KG/M2 | TEMPERATURE: 98 F | SYSTOLIC BLOOD PRESSURE: 124 MMHG | DIASTOLIC BLOOD PRESSURE: 80 MMHG | OXYGEN SATURATION: 98 % | WEIGHT: 201.81 LBS

## 2025-08-27 DIAGNOSIS — R42 ORTHOSTATIC DIZZINESS: Primary | ICD-10-CM

## 2025-08-27 DIAGNOSIS — R53.83 FATIGUE, UNSPECIFIED TYPE: ICD-10-CM

## 2025-08-27 DIAGNOSIS — I10 ESSENTIAL HYPERTENSION: ICD-10-CM

## 2025-08-27 PROCEDURE — 3044F HG A1C LEVEL LT 7.0%: CPT | Mod: CPTII,S$GLB,, | Performed by: NURSE PRACTITIONER

## 2025-08-27 PROCEDURE — 3008F BODY MASS INDEX DOCD: CPT | Mod: CPTII,S$GLB,, | Performed by: NURSE PRACTITIONER

## 2025-08-27 PROCEDURE — 1160F RVW MEDS BY RX/DR IN RCRD: CPT | Mod: CPTII,S$GLB,, | Performed by: NURSE PRACTITIONER

## 2025-08-27 PROCEDURE — 3074F SYST BP LT 130 MM HG: CPT | Mod: CPTII,S$GLB,, | Performed by: NURSE PRACTITIONER

## 2025-08-27 PROCEDURE — 3079F DIAST BP 80-89 MM HG: CPT | Mod: CPTII,S$GLB,, | Performed by: NURSE PRACTITIONER

## 2025-08-27 PROCEDURE — 1126F AMNT PAIN NOTED NONE PRSNT: CPT | Mod: CPTII,S$GLB,, | Performed by: NURSE PRACTITIONER

## 2025-08-27 PROCEDURE — 4010F ACE/ARB THERAPY RXD/TAKEN: CPT | Mod: CPTII,S$GLB,, | Performed by: NURSE PRACTITIONER

## 2025-08-27 PROCEDURE — 1101F PT FALLS ASSESS-DOCD LE1/YR: CPT | Mod: CPTII,S$GLB,, | Performed by: NURSE PRACTITIONER

## 2025-08-27 PROCEDURE — 3288F FALL RISK ASSESSMENT DOCD: CPT | Mod: CPTII,S$GLB,, | Performed by: NURSE PRACTITIONER

## 2025-08-27 PROCEDURE — 99999 PR PBB SHADOW E&M-EST. PATIENT-LVL III: CPT | Mod: PBBFAC,,, | Performed by: NURSE PRACTITIONER

## 2025-08-27 PROCEDURE — 1159F MED LIST DOCD IN RCRD: CPT | Mod: CPTII,S$GLB,, | Performed by: NURSE PRACTITIONER

## 2025-08-27 PROCEDURE — 99214 OFFICE O/P EST MOD 30 MIN: CPT | Mod: S$GLB,,, | Performed by: NURSE PRACTITIONER
